# Patient Record
Sex: MALE | Race: WHITE | NOT HISPANIC OR LATINO | Employment: OTHER | RURAL
[De-identification: names, ages, dates, MRNs, and addresses within clinical notes are randomized per-mention and may not be internally consistent; named-entity substitution may affect disease eponyms.]

---

## 2021-01-18 ENCOUNTER — HISTORICAL (OUTPATIENT)
Dept: ADMINISTRATIVE | Facility: HOSPITAL | Age: 81
End: 2021-01-18

## 2021-03-23 RX ORDER — AMLODIPINE BESYLATE 5 MG/1
5 TABLET ORAL DAILY
COMMUNITY
End: 2021-06-07 | Stop reason: SDUPTHER

## 2021-03-23 RX ORDER — TAMSULOSIN HYDROCHLORIDE 0.4 MG/1
1 CAPSULE ORAL DAILY
Qty: 90 CAPSULE | Refills: 1 | Status: SHIPPED | OUTPATIENT
Start: 2021-03-23 | End: 2021-10-12 | Stop reason: SDUPTHER

## 2021-03-23 RX ORDER — AMOXICILLIN 500 MG
2 CAPSULE ORAL 2 TIMES DAILY
COMMUNITY

## 2021-03-23 RX ORDER — TAMSULOSIN HYDROCHLORIDE 0.4 MG/1
1 CAPSULE ORAL DAILY
COMMUNITY
Start: 2020-12-16 | End: 2021-03-23 | Stop reason: SDUPTHER

## 2021-03-23 RX ORDER — MELOXICAM 15 MG/1
15 TABLET ORAL DAILY PRN
COMMUNITY
Start: 2021-03-23 | End: 2021-07-20

## 2021-03-23 RX ORDER — EZETIMIBE 10 MG/1
10 TABLET ORAL DAILY
COMMUNITY
Start: 2021-02-22 | End: 2021-10-12

## 2021-03-23 RX ORDER — HYDROCHLOROTHIAZIDE 12.5 MG/1
12.5 CAPSULE ORAL DAILY
COMMUNITY
End: 2021-06-07

## 2021-03-23 RX ORDER — CLOPIDOGREL BISULFATE 75 MG/1
75 TABLET ORAL DAILY
COMMUNITY
Start: 2021-03-01 | End: 2021-06-14

## 2021-03-23 RX ORDER — PRAVASTATIN SODIUM 80 MG/1
80 TABLET ORAL NIGHTLY
COMMUNITY
Start: 2021-02-08 | End: 2021-10-12

## 2021-03-23 RX ORDER — PANTOPRAZOLE SODIUM 40 MG/1
40 TABLET, DELAYED RELEASE ORAL DAILY
COMMUNITY
End: 2022-04-18

## 2021-03-30 ENCOUNTER — OFFICE VISIT (OUTPATIENT)
Dept: FAMILY MEDICINE | Facility: CLINIC | Age: 81
End: 2021-03-30
Payer: MEDICARE

## 2021-03-30 VITALS
DIASTOLIC BLOOD PRESSURE: 88 MMHG | RESPIRATION RATE: 20 BRPM | WEIGHT: 218 LBS | HEIGHT: 69 IN | OXYGEN SATURATION: 99 % | BODY MASS INDEX: 32.29 KG/M2 | SYSTOLIC BLOOD PRESSURE: 136 MMHG | HEART RATE: 72 BPM | TEMPERATURE: 98 F

## 2021-03-30 DIAGNOSIS — I10 ESSENTIAL HYPERTENSION: ICD-10-CM

## 2021-03-30 DIAGNOSIS — M79.674 TOE PAIN, RIGHT: ICD-10-CM

## 2021-03-30 DIAGNOSIS — L03.031 CELLULITIS OF GREAT TOE OF RIGHT FOOT: Primary | ICD-10-CM

## 2021-03-30 PROCEDURE — 99213 OFFICE O/P EST LOW 20 MIN: CPT | Mod: ,,, | Performed by: NURSE PRACTITIONER

## 2021-03-30 PROCEDURE — 99213 PR OFFICE/OUTPT VISIT, EST, LEVL III, 20-29 MIN: ICD-10-PCS | Mod: ,,, | Performed by: NURSE PRACTITIONER

## 2021-03-30 RX ORDER — KETOROLAC TROMETHAMINE 30 MG/ML
60 INJECTION, SOLUTION INTRAMUSCULAR; INTRAVENOUS
Status: DISCONTINUED | OUTPATIENT
Start: 2021-03-30 | End: 2021-03-30

## 2021-03-30 RX ORDER — CEFTRIAXONE 1 G/1
1 INJECTION, POWDER, FOR SOLUTION INTRAMUSCULAR; INTRAVENOUS
Status: DISCONTINUED | OUTPATIENT
Start: 2021-03-30 | End: 2021-10-12

## 2021-03-30 RX ORDER — KETOROLAC TROMETHAMINE 30 MG/ML
60 INJECTION, SOLUTION INTRAMUSCULAR; INTRAVENOUS
Status: DISCONTINUED | OUTPATIENT
Start: 2021-03-30 | End: 2021-07-20

## 2021-03-30 RX ORDER — CEFTRIAXONE 1 G/1
1 INJECTION, POWDER, FOR SOLUTION INTRAMUSCULAR; INTRAVENOUS
Status: DISCONTINUED | OUTPATIENT
Start: 2021-03-30 | End: 2021-03-30

## 2021-04-15 ENCOUNTER — TELEPHONE (OUTPATIENT)
Dept: FAMILY MEDICINE | Facility: CLINIC | Age: 81
End: 2021-04-15

## 2021-04-20 RX ORDER — LOSARTAN POTASSIUM AND HYDROCHLOROTHIAZIDE 12.5; 1 MG/1; MG/1
1 TABLET ORAL DAILY
COMMUNITY
End: 2021-04-20 | Stop reason: SDUPTHER

## 2021-04-20 RX ORDER — LOSARTAN POTASSIUM AND HYDROCHLOROTHIAZIDE 12.5; 1 MG/1; MG/1
1 TABLET ORAL DAILY
Qty: 90 TABLET | Refills: 1 | Status: SHIPPED | OUTPATIENT
Start: 2021-04-20 | End: 2022-04-18 | Stop reason: SDUPTHER

## 2021-05-03 ENCOUNTER — HISTORICAL (OUTPATIENT)
Dept: ADMINISTRATIVE | Facility: HOSPITAL | Age: 81
End: 2021-05-03

## 2021-06-07 ENCOUNTER — OFFICE VISIT (OUTPATIENT)
Dept: FAMILY MEDICINE | Facility: CLINIC | Age: 81
End: 2021-06-07
Payer: MEDICARE

## 2021-06-07 VITALS
DIASTOLIC BLOOD PRESSURE: 100 MMHG | HEIGHT: 69 IN | SYSTOLIC BLOOD PRESSURE: 142 MMHG | BODY MASS INDEX: 32.61 KG/M2 | RESPIRATION RATE: 20 BRPM | TEMPERATURE: 98 F | HEART RATE: 63 BPM | OXYGEN SATURATION: 98 % | WEIGHT: 220.19 LBS

## 2021-06-07 DIAGNOSIS — L25.5 CONTACT DERMATITIS DUE TO PLANT: ICD-10-CM

## 2021-06-07 DIAGNOSIS — I10 ESSENTIAL HYPERTENSION: Primary | ICD-10-CM

## 2021-06-07 PROCEDURE — 96372 PR INJECTION,THERAP/PROPH/DIAG2ST, IM OR SUBCUT: ICD-10-PCS | Mod: ,,, | Performed by: NURSE PRACTITIONER

## 2021-06-07 PROCEDURE — 99214 OFFICE O/P EST MOD 30 MIN: CPT | Mod: 25,,, | Performed by: NURSE PRACTITIONER

## 2021-06-07 PROCEDURE — 99214 PR OFFICE/OUTPT VISIT, EST, LEVL IV, 30-39 MIN: ICD-10-PCS | Mod: 25,,, | Performed by: NURSE PRACTITIONER

## 2021-06-07 PROCEDURE — 96372 THER/PROPH/DIAG INJ SC/IM: CPT | Mod: ,,, | Performed by: NURSE PRACTITIONER

## 2021-06-07 RX ORDER — DEXAMETHASONE SODIUM PHOSPHATE 4 MG/ML
6 INJECTION, SOLUTION INTRA-ARTICULAR; INTRALESIONAL; INTRAMUSCULAR; INTRAVENOUS; SOFT TISSUE
Status: COMPLETED | OUTPATIENT
Start: 2021-06-07 | End: 2021-06-07

## 2021-06-07 RX ORDER — AMLODIPINE BESYLATE 5 MG/1
5 TABLET ORAL DAILY
Qty: 90 TABLET | Refills: 1 | Status: SHIPPED | OUTPATIENT
Start: 2021-06-07 | End: 2022-02-14 | Stop reason: SDUPTHER

## 2021-06-07 RX ORDER — TRIAMCINOLONE ACETONIDE 1 MG/G
OINTMENT TOPICAL 2 TIMES DAILY
Qty: 453.6 G | Refills: 0 | Status: SHIPPED | OUTPATIENT
Start: 2021-06-07 | End: 2022-12-14

## 2021-06-07 RX ADMIN — DEXAMETHASONE SODIUM PHOSPHATE 6 MG: 4 INJECTION, SOLUTION INTRA-ARTICULAR; INTRALESIONAL; INTRAMUSCULAR; INTRAVENOUS; SOFT TISSUE at 12:06

## 2021-08-17 ENCOUNTER — HOSPITAL ENCOUNTER (OUTPATIENT)
Dept: RADIOLOGY | Facility: HOSPITAL | Age: 81
Discharge: HOME | End: 2021-08-17
Attending: NURSE PRACTITIONER
Payer: MEDICARE

## 2021-08-17 DIAGNOSIS — M25.512 LEFT SHOULDER PAIN, UNSPECIFIED CHRONICITY: ICD-10-CM

## 2021-08-17 PROCEDURE — 73030 XR SHOULDER COMPLETE 2 OR MORE VIEWS LEFT: ICD-10-PCS | Mod: 26,LT,, | Performed by: RADIOLOGY

## 2021-08-17 PROCEDURE — 73030 X-RAY EXAM OF SHOULDER: CPT | Mod: TC,LT

## 2021-08-17 PROCEDURE — 73030 X-RAY EXAM OF SHOULDER: CPT | Mod: 26,LT,, | Performed by: RADIOLOGY

## 2021-10-12 ENCOUNTER — OFFICE VISIT (OUTPATIENT)
Dept: FAMILY MEDICINE | Facility: CLINIC | Age: 81
End: 2021-10-12
Payer: MEDICARE

## 2021-10-12 VITALS
HEART RATE: 75 BPM | BODY MASS INDEX: 32.14 KG/M2 | WEIGHT: 217 LBS | OXYGEN SATURATION: 98 % | TEMPERATURE: 98 F | HEIGHT: 69 IN | SYSTOLIC BLOOD PRESSURE: 128 MMHG | DIASTOLIC BLOOD PRESSURE: 80 MMHG | RESPIRATION RATE: 20 BRPM

## 2021-10-12 DIAGNOSIS — Z53.20 STATIN DECLINED: ICD-10-CM

## 2021-10-12 DIAGNOSIS — Z86.73 HISTORY OF CVA (CEREBROVASCULAR ACCIDENT): ICD-10-CM

## 2021-10-12 DIAGNOSIS — E78.2 MIXED HYPERLIPIDEMIA: ICD-10-CM

## 2021-10-12 DIAGNOSIS — I10 PRIMARY HYPERTENSION: Primary | ICD-10-CM

## 2021-10-12 PROCEDURE — 99213 PR OFFICE/OUTPT VISIT, EST, LEVL III, 20-29 MIN: ICD-10-PCS | Mod: ,,, | Performed by: NURSE PRACTITIONER

## 2021-10-12 PROCEDURE — 99213 OFFICE O/P EST LOW 20 MIN: CPT | Mod: ,,, | Performed by: NURSE PRACTITIONER

## 2021-10-12 RX ORDER — CLOPIDOGREL BISULFATE 75 MG/1
75 TABLET ORAL DAILY
Qty: 90 TABLET | Refills: 3 | Status: SHIPPED | OUTPATIENT
Start: 2021-10-12 | End: 2022-04-18 | Stop reason: SDUPTHER

## 2021-10-12 RX ORDER — TAMSULOSIN HYDROCHLORIDE 0.4 MG/1
1 CAPSULE ORAL DAILY
Qty: 90 CAPSULE | Refills: 3 | Status: SHIPPED | OUTPATIENT
Start: 2021-10-12 | End: 2022-11-29 | Stop reason: SDUPTHER

## 2021-10-28 ENCOUNTER — OFFICE VISIT (OUTPATIENT)
Dept: FAMILY MEDICINE | Facility: CLINIC | Age: 81
End: 2021-10-28
Payer: MEDICARE

## 2021-10-28 DIAGNOSIS — J01.90 ACUTE NON-RECURRENT SINUSITIS, UNSPECIFIED LOCATION: ICD-10-CM

## 2021-10-28 DIAGNOSIS — J20.9 ACUTE BRONCHITIS, UNSPECIFIED ORGANISM: Primary | ICD-10-CM

## 2021-10-28 PROCEDURE — 96372 THER/PROPH/DIAG INJ SC/IM: CPT | Mod: ,,, | Performed by: NURSE PRACTITIONER

## 2021-10-28 PROCEDURE — 99212 OFFICE O/P EST SF 10 MIN: CPT | Mod: 25,,, | Performed by: NURSE PRACTITIONER

## 2021-10-28 PROCEDURE — 99212 PR OFFICE/OUTPT VISIT, EST, LEVL II, 10-19 MIN: ICD-10-PCS | Mod: 25,,, | Performed by: NURSE PRACTITIONER

## 2021-10-28 PROCEDURE — 96372 PR INJECTION,THERAP/PROPH/DIAG2ST, IM OR SUBCUT: ICD-10-PCS | Mod: ,,, | Performed by: NURSE PRACTITIONER

## 2021-10-28 RX ORDER — PREDNISONE 20 MG/1
TABLET ORAL
Qty: 10 TABLET | Refills: 0 | Status: SHIPPED | OUTPATIENT
Start: 2021-10-28 | End: 2022-04-18

## 2021-10-28 RX ORDER — AZITHROMYCIN 250 MG/1
TABLET, FILM COATED ORAL
Qty: 6 TABLET | Refills: 0 | Status: SHIPPED | OUTPATIENT
Start: 2021-10-28 | End: 2022-04-18

## 2021-10-28 RX ORDER — DEXAMETHASONE SODIUM PHOSPHATE 4 MG/ML
6 INJECTION, SOLUTION INTRA-ARTICULAR; INTRALESIONAL; INTRAMUSCULAR; INTRAVENOUS; SOFT TISSUE
Status: COMPLETED | OUTPATIENT
Start: 2021-10-28 | End: 2021-10-28

## 2021-10-28 RX ORDER — CEFTRIAXONE 1 G/1
1 INJECTION, POWDER, FOR SOLUTION INTRAMUSCULAR; INTRAVENOUS
Status: COMPLETED | OUTPATIENT
Start: 2021-10-28 | End: 2021-10-28

## 2021-10-28 RX ADMIN — DEXAMETHASONE SODIUM PHOSPHATE 6 MG: 4 INJECTION, SOLUTION INTRA-ARTICULAR; INTRALESIONAL; INTRAMUSCULAR; INTRAVENOUS; SOFT TISSUE at 12:10

## 2021-10-28 RX ADMIN — CEFTRIAXONE 1 G: 1 INJECTION, POWDER, FOR SOLUTION INTRAMUSCULAR; INTRAVENOUS at 12:10

## 2021-10-30 VITALS — OXYGEN SATURATION: 96 % | HEART RATE: 63 BPM

## 2022-02-01 ENCOUNTER — TELEPHONE (OUTPATIENT)
Dept: FAMILY MEDICINE | Facility: CLINIC | Age: 82
End: 2022-02-01
Payer: MEDICARE

## 2022-02-01 DIAGNOSIS — K21.9 GASTROESOPHAGEAL REFLUX DISEASE, UNSPECIFIED WHETHER ESOPHAGITIS PRESENT: Primary | ICD-10-CM

## 2022-02-01 RX ORDER — FAMOTIDINE 40 MG/1
40 TABLET, FILM COATED ORAL NIGHTLY PRN
Qty: 90 TABLET | Refills: 1 | Status: SHIPPED | OUTPATIENT
Start: 2022-02-01 | End: 2022-04-18

## 2022-02-01 NOTE — TELEPHONE ENCOUNTER
Spoke to pts wife. They are unsure what medication he used to take. I looked in [facility] and only see Protonix.     Pt really wants to switch because the previous one worked a lot better than this one.    Do you possibly remember what he used to take?    [facility].

## 2022-02-01 NOTE — TELEPHONE ENCOUNTER
----- Message from Janine Retana sent at 1/31/2022  3:16 PM CST -----  Pt stated that he wants to be back on the acid reflux that matthew had given to him before he started taking this new one. He stated that the old one worked really well and this new one doesn't help him any      [phone]

## 2022-02-02 NOTE — TELEPHONE ENCOUNTER
Spoke to pt. He does not want to start any additional medications. He states he took a tums and it helped with his heartburn. I stressed the importance of following up with cardiology if his problem persisted. He voiced understanding.

## 2022-02-02 NOTE — TELEPHONE ENCOUNTER
No I have no idea. The problem is that he has to be on plavix/clopidigrel; other PPIs are contraindicated with it. Continue protonix 40 mg daily and I will add pepcid 40 mg daily. Make sure he is aware that bad indigestion/heartburn can be mistaken as an actual heart problem, so he may need to see a cardiologist or have an upper scope if the problem persists.

## 2022-02-10 ENCOUNTER — OFFICE VISIT (OUTPATIENT)
Dept: FAMILY MEDICINE | Facility: CLINIC | Age: 82
End: 2022-02-10
Payer: MEDICARE

## 2022-02-10 VITALS
WEIGHT: 215 LBS | OXYGEN SATURATION: 97 % | HEART RATE: 79 BPM | HEIGHT: 69 IN | DIASTOLIC BLOOD PRESSURE: 73 MMHG | SYSTOLIC BLOOD PRESSURE: 155 MMHG | RESPIRATION RATE: 20 BRPM | BODY MASS INDEX: 31.84 KG/M2

## 2022-02-10 DIAGNOSIS — R52 BODY ACHES: ICD-10-CM

## 2022-02-10 DIAGNOSIS — J06.9 UPPER RESPIRATORY TRACT INFECTION, UNSPECIFIED TYPE: ICD-10-CM

## 2022-02-10 DIAGNOSIS — R05.9 COUGH: ICD-10-CM

## 2022-02-10 DIAGNOSIS — R51.9 NONINTRACTABLE HEADACHE, UNSPECIFIED CHRONICITY PATTERN, UNSPECIFIED HEADACHE TYPE: ICD-10-CM

## 2022-02-10 DIAGNOSIS — I10 ESSENTIAL HYPERTENSION: ICD-10-CM

## 2022-02-10 DIAGNOSIS — U07.1 COVID-19: Primary | ICD-10-CM

## 2022-02-10 DIAGNOSIS — Z86.73 HISTORY OF CVA (CEREBROVASCULAR ACCIDENT): ICD-10-CM

## 2022-02-10 LAB
CTP QC/QA: YES
FLUAV AG NPH QL: NEGATIVE
FLUBV AG NPH QL: NEGATIVE
SARS-COV-2 AG RESP QL IA.RAPID: POSITIVE

## 2022-02-10 PROCEDURE — 99212 OFFICE O/P EST SF 10 MIN: CPT | Mod: CR,,, | Performed by: NURSE PRACTITIONER

## 2022-02-10 PROCEDURE — 99212 PR OFFICE/OUTPT VISIT, EST, LEVL II, 10-19 MIN: ICD-10-PCS | Mod: CR,,, | Performed by: NURSE PRACTITIONER

## 2022-02-10 PROCEDURE — 87428 SARSCOV & INF VIR A&B AG IA: CPT | Mod: RHCUB | Performed by: NURSE PRACTITIONER

## 2022-02-10 NOTE — PATIENT INSTRUCTIONS
Patient Education       Preventing Falls in the Older Adult   About this topic   A fall is the sudden loss of balance that causes a person to drop to the ground or floor. Falls are a serious health risk and they happen more often as we get older. Many things may increase your risk of falling, like:  · Problems that come with getting older  ? Muscle weakness  ? Balance problems  ? More trouble seeing  · Personal health factors  ? Health conditions such as arthritis, Parkinson's disease, low blood pressure, or stroke  ? Medicines you take  ? Loss of feeling in your feet  ? Being less active  ? Taking drugs that makes you dizzy or drowsy  ? Habits like alcohol use  · Things around your house  ? Slippery floors  ? Unsecured rugs  ? Stairs  ? Wearing improper fitting shoes  ? Areas where it is dark and difficult to see  ? Incorrect size or type of assistive devices  ? Clutter and items on the floor that block your walkway     What will the results be?   · Prevent future falls  · Avoid injuries and disabilities  · Improve overall health  Will there be any other care needed?   · Ask your doctor if you need to take vitamin D to help keep your bones strong.  · Make your home safer. Get rid of things that might make you trip or slip. These are things like loose rugs, electrical cords, or clutter. Add grab bars, a shower seat, and handrails.  · Wear sturdy shoes that fit well. Shoes should fit well, have a low heel, and the soles of the shoe should not be slippery. Walking in socks or with bare feet can raise your chance for falling.  · Stay active. Walk, garden, swim, or do something active on a regular basis. These activities may prevent you from getting hurt if you do fall. They also help with your strength and balance.  · Use a cane, walker, or other safety device. Be sure it is the right size for you and that you know how to use it safely. Be sure to wear your eyeglasses if they have been ordered for you.  · Get up slowly  after you sit or lie down. Try to change positions slowly.  · What to do if you fall:  ? Stay calm and do not panic.  ? Look for signs to decide if you have been hurt or have an injury.  ? If you think you can get up safely, try to get up.  ? If you are hurt or cannot get up on your own, try to get help.  ? If no one is available to help, try to get comfortable and wait for someone to arrive who can help you.  ? Stay warm and move regularly as you are able. Avoid putting too much pressure on any one area.  ? After a fall, tell family and friends that you have fallen. It is also important to talk to your doctor about your fall right away.  What problems could happen?   A fall can lead to broken bones and other serious injuries in older adults. Problems that happen because of a fall may even lead to death in older adults. Many people are not able to return to their former level of activity after a fall.  What can be done to prevent this health problem?   Lower Your Risk of Falling  · Wear your eyeglasses. Have regular eye checkups. Do not use reading glasses when you walk around.  · Quit smoking and limit alcohol intake. Smoking and too much alcohol can decrease bone mass and increase the chance of broken bones.  · Know the side effects of the drugs you are taking. Some drugs may affect your balance and cause confusion or sleepiness.  · Get up slowly after you sit or lie down. Do not change positions quickly. Do not rush when you need to go to the bathroom or to answer the phone.  Stay Physically Active  · Be physically active. This will help to improve your strength and balance.  · Fear of falling may lead you to avoid activities. Talk to your doctor. You may be sent to a physical therapist. This person can help you improve balance and build your confidence. Getting rid of your fear of falling can help you stay active and prevent future falls.  · Join an exercise program. Ask your doctor what exercise is safe for  you. Be sure to ask before you do any exercises, especially if you have illnesses like arthritis. Exercise can help you keep muscles strong and help with your balance. It is also a good way to learn proper ways to do each activity or exercise.  Safety Tips at Home  · Keep your floors and walking areas clear from clutter. Remove furniture that blocks your way. Secure cords and wires near the wall to avoid tripping over them. Get rid of throw rugs.  · Be sure the lights in your house are working well and provide good lighting throughout your home. Make sure you can reach switches and lamps easily. Place a lamp close to your bed that is easy to reach.  · Fix all steps and sidewalks to make them smooth and even. Put handrails and lights on stairs.  · Keep all the things you use often on low shelves or in cabinets that are at about waist level. Ask for help to move items off of high shelves. Do not use a chair as a step stool.  · Keep your bathroom area safe. Use nonslip rubber mats on the floor and in the tub or shower.  · Keep a phone near you in case of emergency. Keep a list of your emergency contact numbers in large print near your phone. Carry a phone with you when you go for a walk. Consider using a personal alarm device that could call for help in case you fall and cannot get up.  · Think about protecting your hip. Hip protectors may be needed if you have a higher chance for falling. Ask your doctor about this.  Where can I learn more?   American Academy of [employer] Physicians  https://familydoctor.org/axvfq-alu-to-lower-your-risk/   NHS  https://www.nhs.uk/conditions/falls/prevention/   Last Reviewed Date   2021-06-07  Consumer Information Use and Disclaimer   This information is not specific medical advice and does not replace information you receive from your health care provider. This is only a brief summary of general information. It does NOT include all information about conditions, illnesses, injuries,  tests, procedures, treatments, therapies, discharge instructions or life-style choices that may apply to you. You must talk with your health care provider for complete information about your health and treatment options. This information should not be used to decide whether or not to accept your health care providers advice, instructions or recommendations. Only your health care provider has the knowledge and training to provide advice that is right for you.  Copyright   Copyright © 2021 [employer] and its affiliates and/or licensors. All rights reserved.    Patient Education       COVID-19 Discharge Instructions   About this topic   Coronavirus disease 2019 is also known as COVID-19. It is a viral illness that infects the lungs. It is caused by a virus called SARS-associated coronavirus (SARS-CoV-2).  The signs of COVID-19 most often start a few days after you have been infected. In some people, it takes longer to show signs. Others never show signs of the infection. You may have a cough, fever, shaking chills and it may be hard to breathe. You may be very tired, have muscle aches, a headache or sore throat. Some people have an upset stomach or loose stools. Others lose their sense of smell or taste. You may not have these signs all the time and they may come and go while you are sick.  The virus spreads easily through droplets when you talk, sneeze, or cough. You can pass the virus to others when you are talking close together, singing, hugging, sharing food, or shaking hands. Doctors believe the germs also survive on surfaces like tables, door handles, and telephones. However, this is not a common way that COVID-19 spreads. Doctors believe you can also spread the infection even if you dont have any symptoms, but they do not know how that happens. This is why getting vaccinated is one of the best ways to keep you healthy and slow the spread of the virus.  Some people have a mild case of COVID-19 and are able  to stay at home and away from others until they feel better. Others may need to be in the hospital if they are very sick. Some people with COVID-19 can have some symptoms for weeks or months. People with COVID-19 must isolate themselves. You can start to be around others when your doctor says it is safe to do so.       What care is needed at home?   · Ask your doctor what you need to do when you go home. Make sure you ask questions if you do not understand what the doctor says.  · Drink lots of water, juice, or broth to replace fluids lost from a fever.  · You may use cool mist humidifiers to help ease congestion and coughing.  · Use 2 to 3 pillows to prop yourself up when you lie down to make it easier to breathe and sleep.  · Do not smoke and do not drink beer, wine, and mixed drinks (alcohol).  · To lower the chance of passing the infection to others, get a COVID-19 vaccine after your infection has resolved.  · If you have not been fully vaccinated:  ? Wear a mask over your mouth and nose if you are around others who are not sick. Cloth masks work best if they have more than one layer of fabric.  ? Wash your hands often.  ? Stay home in a separate room, if possible, away from others. Only go out to get medical care.  ? Use a separate bathroom if possible.  ? Do not make food for others.  What follow-up care is needed?   · Your doctor may ask you to make visits to the office to check on your progress. Be sure to keep these visits. Make sure you wear a mask at these visits.  · If you can, tell the staff you have COVID-19 ahead of time so they can take extra care to stop the disease from spreading.  · It may take a few weeks before your health returns to normal.  What drugs may be needed?   The doctor may order drugs to:  · Help with breathing  · Help with fever  · Help with swelling in your airways and lungs  · Control coughing  · Ease a sore throat  · Help a runny or stuffy nose  Will physical activity be limited?    You may have to limit your physical activity. Talk to your doctor about the right amount of activity for you. If you have been very sick with COVID-19, it can take some time to get your strength back.  Will there be any other care needed?   Doctors do not know how long you can pass the virus on to others after you are sick. This is why it is important to stay in a separate room, if possible, when you are sick. For now, doctors are giving general guidelines for you to follow after you have been sick. Before you go around other people, you should:  · Be fever free for 24 hours without taking any drugs to lower the fever  · Have no symptoms of cough or shortness of breath  · Wait at least 10 days after first having symptoms or your first positive test, and you need to be symptom free as above. Some experts suggest waiting 20 days if you have had a more severe infection.  Talk with your doctor about getting a COVID-19 vaccine.  What problems could happen?   · Fluid loss. This is dehydration.  · Short-term or long-term lung damage  · Heart problems  · Death  When do I need to call the doctor?   · You are having so much trouble breathing that you can only say one or two words at a time.  · You need to sit upright at all times to be able to breathe and/or cannot lie down.  · You are very confused or cannot stay awake.  · Your lips or skin start to turn blue or grey.  · You think you might be having a medical emergency. Some examples of medical emergencies are:  ? Severe chest pain.  ? Not able to speak or move normally.  · You have trouble breathing when talking or sitting still.  · You have new shortness of breath.  · You become weak or dizzy.  · You have very dark urine or do not pass urine for more than 8 hours.  · You have new or worsening COVID-19 symptoms like:  ? Fever  ? Cough  ? Feeling very tired  ? Shaking chills  ? Headache  ? Trouble swallowing  ? Throwing up  ? Loose stools  ? Reddish purple spots on your  fingers or toes  Teach Back: Helping You Understand   The Teach Back Method helps you understand the information we are giving you. After you talk with the staff, tell them in your own words what you learned. This helps to make sure the staff has described each thing clearly. It also helps to explain things that may have been confusing. Before going home, make sure you can do these:  · I can tell you about my condition.  · I can tell you what may help ease my breathing.  · I can tell you what I can do to help avoid passing the infection to others.  · I can tell you what I will do if I have trouble breathing; feel sleepy or confused; or my fingertips, fingernails, skin, or lips are blue.  Where can I learn more?   Centers for Disease Control and Prevention  https://www.cdc.gov/coronavirus/2019-ncov/about/index.html   Centers for Disease Control and Prevention  https://www.cdc.gov/coronavirus/2019-ncov/hcp/disposition-in-home-patients.html   World Health Organization  https://www.who.int/news-room/q-a-detail/a-k-auxyuvbsdxqew   Last Reviewed Date   2021-10-05  Consumer Information Use and Disclaimer   This information is not specific medical advice and does not replace information you receive from your health care provider. This is only a brief summary of general information. It does NOT include all information about conditions, illnesses, injuries, tests, procedures, treatments, therapies, discharge instructions or life-style choices that may apply to you. You must talk with your health care provider for complete information about your health and treatment options. This information should not be used to decide whether or not to accept your health care providers advice, instructions or recommendations. Only your health care provider has the knowledge and training to provide advice that is right for you.  Copyright   Copyright © 2021 UpToDate, Inc. and its affiliates and/or licensors. All rights reserved.  Patient  Education       Sotrovimab FDA Fact Sheet   About this topic         Sotrovimab FDA Fact Sheet: https://www.fda.gov/media/210340/download   https://www.[employer]   Report side effects to FDA MedWatch: www.fda.gov/medwatch   FDA: https://www.fda.gov/emergency-preparedness-and-response/mcm-legal-regulatory-and-policy-framework/emergency-use-authorization   NIH: https://www.tribz64zzmaludtyfzcupurtto.nih.gov/   Combat Covid: https://combatcovid.hhs.gov/i-have-covid-19-now/available-covid-19-treatment-options

## 2022-02-10 NOTE — PROGRESS NOTES
[facility] - FAMILY MEDICINE       PATIENT NAME: Cj Thomas   : 1940    AGE: 81 y.o. DATE: 02/10/2022    MRN: [id number]        Reason for Visit / Chief Complaint:  URI (Fever(101.9), fatigue, cough, headache, body aches, congestion, sore throat x 1 day. No SOB, loss of taste/smell. Vaccinated. No known exposure.)     Subjective:     Presents with wife for car visit due to COVID-19 concerns and screening guidelines. Chief complaint reviewed. C/o symptoms as indicated in ROS x 1 day.  [place] fine last night, went to [employer]. Malaise and temp started today.    Review of Systems:    Review of Systems   Constitutional: Positive for fatigue and fever. Negative for appetite change and chills.   HENT: Positive for congestion and sore throat. Negative for ear pain, postnasal drip, rhinorrhea and sinus pain.    Respiratory: Positive for cough. Negative for shortness of breath and wheezing.    Cardiovascular: Negative for chest pain.   Gastrointestinal: Negative for abdominal pain, diarrhea, nausea and vomiting.   Musculoskeletal: Positive for myalgias.   Skin: Negative.    Neurological: Positive for headaches.        Review of patient's allergies indicates:   Allergen Reactions    Niacin Swelling    Demerol [meperidine]     Iodine and iodide containing products     Statins-hmg-coa reductase inhibitors      Abnormal behavior. Body aches    Iodinated contrast media Rash        Med List:  Current Outpatient Medications on File Prior to Visit   Medication Sig Dispense Refill    amLODIPine (NORVASC) 5 MG tablet Take 1 tablet (5 mg total) by mouth once daily. 90 tablet 1    clopidogreL (PLAVIX) 75 mg tablet Take 1 tablet (75 mg total) by mouth once daily. 90 tablet 3    famotidine (PEPCID) 40 MG tablet Take 1 tablet (40 mg total) by mouth nightly as needed for Heartburn. 90 tablet 1    losartan-hydrochlorothiazide 100-12.5 mg (HYZAAR) 100-12.5 mg Tab Take 1  "tablet by mouth once daily. (Patient taking differently: Take 1 tablet by mouth once daily. Taking 0.5 tablet once daily) 90 tablet 1    omega-3 fatty acids/fish oil (FISH OIL-OMEGA-3 FATTY ACIDS) 300-1,000 mg capsule Take 2 capsules by mouth 2 (two) times a day.       pantoprazole (PROTONIX) 40 MG tablet Take 40 mg by mouth once daily.      predniSONE (DELTASONE) 20 MG tablet 1 tablet by mouth BID x 5 days 10 tablet 0    tamsulosin (FLOMAX) 0.4 mg Cap Take 1 capsule (0.4 mg total) by mouth once daily. 90 capsule 3    triamcinolone acetonide 0.1% (KENALOG) 0.1 % ointment Apply topically 2 (two) times daily. 453.6 g 0    azithromycin (Z-PAPI) 250 MG tablet Take 2 tablets by mouth on day 1; Take 1 tablet by mouth on days 2-5 (Patient not taking: Reported on 2/10/2022) 6 tablet 0     No current facility-administered medications on file prior to visit.       Medical/Social/Family History:  Past Medical History:   Diagnosis Date    Hyperlipidemia     Hypertension     Stroke       Social History     Tobacco Use   Smoking Status Never Smoker   Smokeless Tobacco Never Used      Immunization History   Administered Date(s) Administered    Influenza 01/21/2011, 10/01/2011, 11/01/2013    Influenza - High Dose - PF (65 years and older) 12/28/2006    Pneumococcal 02/28/2006    Pneumococcal Conjugate - 13 Valent 04/09/2018    Pneumococcal Polysaccharide - 23 Valent 02/28/2006, 06/20/2014, 04/01/2018    Td (Adult), Unspecified Formulation 07/01/2005    Tdap 11/20/2015, 09/07/2017        Objective:      Vitals:    02/10/22 1409 02/10/22 1422   BP: (!) 149/67 (!) 155/73   BP Location: Left arm Left arm   Patient Position: Sitting Sitting   BP Method: Large (Automatic) Large (Automatic)   Pulse: 79    Resp: 20    SpO2: 97%    Weight: 97.5 kg (215 lb)  Comment: Pt estimated    Height: 5' 9" (1.753 m)      Body mass index is 31.75 kg/m².     Physical Exam:    Physical Exam  Vitals and nursing note reviewed. "   Constitutional:       General: He is not in acute distress.     Appearance: Normal appearance. He is obese. He is not ill-appearing.   HENT:      Head: Normocephalic.   Eyes:      Conjunctiva/sclera: Conjunctivae normal.   Cardiovascular:      Rate and Rhythm: Normal rate.   Pulmonary:      Effort: Pulmonary effort is normal. No respiratory distress.   Skin:     Coloration: Skin is not pale.   Neurological:      Mental Status: He is alert and oriented to person, place, and time.         Assessment:          ICD-10-CM ICD-9-CM   1. COVID-19  U07.1 079.89   2. Body aches  R52 780.96   3. Nonintractable headache, unspecified chronicity pattern, unspecified headache type  R51.9 784.0   4. Cough  R05.9 786.2   5. Upper respiratory tract infection, unspecified type  J06.9 465.9   6. Essential hypertension  I10 401.9   7. History of CVA (cerebrovascular accident)  Z86.73 V12.54        Plan:       COVID-19  -     Ambulatory referral/consult to [facility]; Future; Expected date: 02/10/2022    Body aches  -     POCT SARS-COV2 (COVID) with Flu Antigen    Nonintractable headache, unspecified chronicity pattern, unspecified headache type  -     POCT SARS-COV2 (COVID) with Flu Antigen    Cough  -     POCT SARS-COV2 (COVID) with Flu Antigen    Upper respiratory tract infection, unspecified type  -     POCT SARS-COV2 (COVID) with Flu Antigen    Essential hypertension  -     Ambulatory referral/consult to [facility]; Future; Expected date: 02/10/2022    History of CVA (cerebrovascular accident)  -     Ambulatory referral/consult to [facility]; Future; Expected date: 02/10/2022        Current Outpatient Medications:     amLODIPine (NORVASC) 5 MG tablet, Take 1 tablet (5 mg total) by mouth once daily., Disp: 90 tablet, Rfl: 1    clopidogreL (PLAVIX) 75 mg tablet, Take 1 tablet (75 mg total) by mouth once daily., Disp: 90 tablet, Rfl: 3    famotidine (PEPCID) 40 MG tablet, Take 1 tablet (40 mg total) by mouth nightly as  needed for Heartburn., Disp: 90 tablet, Rfl: 1    losartan-hydrochlorothiazide 100-12.5 mg (HYZAAR) 100-12.5 mg Tab, Take 1 tablet by mouth once daily. (Patient taking differently: Take 1 tablet by mouth once daily. Taking 0.5 tablet once daily), Disp: 90 tablet, Rfl: 1    omega-3 fatty acids/fish oil (FISH OIL-OMEGA-3 FATTY ACIDS) 300-1,000 mg capsule, Take 2 capsules by mouth 2 (two) times a day. , Disp: , Rfl:     pantoprazole (PROTONIX) 40 MG tablet, Take 40 mg by mouth once daily., Disp: , Rfl:     predniSONE (DELTASONE) 20 MG tablet, 1 tablet by mouth BID x 5 days, Disp: 10 tablet, Rfl: 0    tamsulosin (FLOMAX) 0.4 mg Cap, Take 1 capsule (0.4 mg total) by mouth once daily., Disp: 90 capsule, Rfl: 3    triamcinolone acetonide 0.1% (KENALOG) 0.1 % ointment, Apply topically 2 (two) times daily., Disp: 453.6 g, Rfl: 0    azithromycin (Z-PAPI) 250 MG tablet, Take 2 tablets by mouth on day 1; Take 1 tablet by mouth on days 2-5 (Patient not taking: Reported on 2/10/2022), Disp: 6 tablet, Rfl: 0      Requested Prescriptions      No prescriptions requested or ordered in this encounter       Rapid COVID POSITIVE and flu negative     Advised to quarantine per updated CDC guidelines.  Advised COVID-19 is a virus and viruses do not respond to antibiotics nor do antibiotics prevent a bacterial infection from developing.   Advised there is still some delta variant COVID-19 circulating, but the Omicron variant seems to be rapidly increasing in the area and tends to have milder symptoms that are shorter in duration.     Advised of the nationwide shortage of COVID MABs, and the very limited supply for very high risk patient's only.   Risk of COVID-19 Complications score: 6     Treatment for COVID-19 is primarily supportive including rest, increase fluids, and the use of OTC meds to help alleviate symptoms.    For immune support take vitamin C, vitamin D, and zinc 50 mg daily.  Acetaminophen or ibuprofen as needed for body  aches, headache, or fever.  Home O2 sat monitoring can be used if experiencing shortness of breath.   Advised to report to ED for evaluation of any new or worsening chest pain, shortness of breath, or other severe symptoms.  Call the office if you have other questions or concerns that arise.    F/u as needed or if symptoms worsen or persist.    Signature: Samantha Araujo FNP-BC

## 2022-02-11 ENCOUNTER — INFUSION (OUTPATIENT)
Dept: INFECTIOUS DISEASES | Facility: HOSPITAL | Age: 82
End: 2022-02-11
Attending: NURSE PRACTITIONER
Payer: MEDICARE

## 2022-02-11 VITALS
HEART RATE: 76 BPM | DIASTOLIC BLOOD PRESSURE: 72 MMHG | SYSTOLIC BLOOD PRESSURE: 135 MMHG | TEMPERATURE: 99 F | OXYGEN SATURATION: 96 %

## 2022-02-11 DIAGNOSIS — I10 ESSENTIAL HYPERTENSION: ICD-10-CM

## 2022-02-11 DIAGNOSIS — U07.1 COVID-19: ICD-10-CM

## 2022-02-11 DIAGNOSIS — Z86.73 HISTORY OF CVA (CEREBROVASCULAR ACCIDENT): ICD-10-CM

## 2022-02-11 PROCEDURE — 63600175 PHARM REV CODE 636 W HCPCS: Performed by: NURSE PRACTITIONER

## 2022-02-11 PROCEDURE — M0247 HC IV INFUSION, SOTROVIMAB, INCL POST ADMIN MONIT: HCPCS | Performed by: NURSE PRACTITIONER

## 2022-02-11 PROCEDURE — 25000003 PHARM REV CODE 250: Performed by: NURSE PRACTITIONER

## 2022-02-11 RX ORDER — EPINEPHRINE 0.3 MG/.3ML
0.3 INJECTION SUBCUTANEOUS
Status: ACTIVE | OUTPATIENT
Start: 2022-02-11 | End: 2022-02-18

## 2022-02-11 RX ORDER — ALBUTEROL SULFATE 90 UG/1
2 AEROSOL, METERED RESPIRATORY (INHALATION)
Status: ACTIVE | OUTPATIENT
Start: 2022-02-11 | End: 2022-02-18

## 2022-02-11 RX ORDER — ACETAMINOPHEN 325 MG/1
650 TABLET ORAL ONCE AS NEEDED
Status: DISCONTINUED | OUTPATIENT
Start: 2022-02-11 | End: 2022-04-18

## 2022-02-11 RX ORDER — ONDANSETRON 4 MG/1
4 TABLET, ORALLY DISINTEGRATING ORAL
Status: ACTIVE | OUTPATIENT
Start: 2022-02-11 | End: 2022-02-18

## 2022-02-11 RX ORDER — SODIUM CHLORIDE 0.9 % (FLUSH) 0.9 %
10 SYRINGE (ML) INJECTION
Status: ACTIVE | OUTPATIENT
Start: 2022-02-11 | End: 2022-02-18

## 2022-02-11 RX ORDER — DIPHENHYDRAMINE HYDROCHLORIDE 50 MG/ML
25 INJECTION INTRAMUSCULAR; INTRAVENOUS
Status: ACTIVE | OUTPATIENT
Start: 2022-02-11 | End: 2022-02-18

## 2022-02-11 RX ADMIN — SODIUM CHLORIDE 500 MG: 9 INJECTION, SOLUTION INTRAVENOUS at 11:02

## 2022-02-14 DIAGNOSIS — I10 ESSENTIAL HYPERTENSION: ICD-10-CM

## 2022-02-14 RX ORDER — AMLODIPINE BESYLATE 5 MG/1
5 TABLET ORAL DAILY
Qty: 90 TABLET | Refills: 1 | Status: SHIPPED | OUTPATIENT
Start: 2022-02-14 | End: 2022-02-15

## 2022-03-11 DIAGNOSIS — Z71.89 COMPLEX CARE COORDINATION: ICD-10-CM

## 2022-04-18 ENCOUNTER — OFFICE VISIT (OUTPATIENT)
Dept: FAMILY MEDICINE | Facility: CLINIC | Age: 82
End: 2022-04-18
Payer: MEDICARE

## 2022-04-18 VITALS
BODY MASS INDEX: 31.58 KG/M2 | WEIGHT: 213.19 LBS | OXYGEN SATURATION: 98 % | HEART RATE: 70 BPM | DIASTOLIC BLOOD PRESSURE: 76 MMHG | RESPIRATION RATE: 20 BRPM | HEIGHT: 69 IN | SYSTOLIC BLOOD PRESSURE: 124 MMHG | TEMPERATURE: 97 F

## 2022-04-18 DIAGNOSIS — E78.2 MIXED HYPERLIPIDEMIA: ICD-10-CM

## 2022-04-18 DIAGNOSIS — M79.674 TOE PAIN, BILATERAL: ICD-10-CM

## 2022-04-18 DIAGNOSIS — I10 ESSENTIAL HYPERTENSION: Primary | ICD-10-CM

## 2022-04-18 DIAGNOSIS — Z86.73 HISTORY OF CVA (CEREBROVASCULAR ACCIDENT): ICD-10-CM

## 2022-04-18 DIAGNOSIS — Z79.899 ENCOUNTER FOR LONG-TERM (CURRENT) USE OF OTHER MEDICATIONS: ICD-10-CM

## 2022-04-18 DIAGNOSIS — M79.675 TOE PAIN, BILATERAL: ICD-10-CM

## 2022-04-18 DIAGNOSIS — Z53.20 STATIN DECLINED: ICD-10-CM

## 2022-04-18 LAB
ALBUMIN SERPL BCP-MCNC: 3.7 G/DL (ref 3.5–5)
ALBUMIN/GLOB SERPL: 1.1 {RATIO}
ALP SERPL-CCNC: 83 U/L (ref 45–115)
ALT SERPL W P-5'-P-CCNC: 25 U/L (ref 16–61)
ANION GAP SERPL CALCULATED.3IONS-SCNC: 10 MMOL/L (ref 7–16)
AST SERPL W P-5'-P-CCNC: 12 U/L (ref 15–37)
BASOPHILS # BLD AUTO: 0.07 K/UL (ref 0–0.2)
BASOPHILS NFR BLD AUTO: 1 % (ref 0–1)
BILIRUB SERPL-MCNC: 0.6 MG/DL (ref 0–1.2)
BUN SERPL-MCNC: 12 MG/DL (ref 7–18)
BUN/CREAT SERPL: 13 (ref 6–20)
CALCIUM SERPL-MCNC: 9.1 MG/DL (ref 8.5–10.1)
CHLORIDE SERPL-SCNC: 102 MMOL/L (ref 98–107)
CHOLEST SERPL-MCNC: 240 MG/DL (ref 0–200)
CHOLEST/HDLC SERPL: 5.6 {RATIO}
CO2 SERPL-SCNC: 31 MMOL/L (ref 21–32)
CREAT SERPL-MCNC: 0.9 MG/DL (ref 0.7–1.3)
DIFFERENTIAL METHOD BLD: ABNORMAL
EOSINOPHIL # BLD AUTO: 0.36 K/UL (ref 0–0.5)
EOSINOPHIL NFR BLD AUTO: 5.3 % (ref 1–4)
ERYTHROCYTE [DISTWIDTH] IN BLOOD BY AUTOMATED COUNT: 12.5 % (ref 11.5–14.5)
GLOBULIN SER-MCNC: 3.3 G/DL (ref 2–4)
GLUCOSE SERPL-MCNC: 85 MG/DL (ref 74–106)
HCT VFR BLD AUTO: 45.3 % (ref 40–54)
HDLC SERPL-MCNC: 43 MG/DL (ref 40–60)
HGB BLD-MCNC: 14.9 G/DL (ref 13.5–18)
IMM GRANULOCYTES # BLD AUTO: 0.02 K/UL (ref 0–0.04)
IMM GRANULOCYTES NFR BLD: 0.3 % (ref 0–0.4)
LDLC SERPL CALC-MCNC: 160 MG/DL
LDLC/HDLC SERPL: 3.7 {RATIO}
LYMPHOCYTES # BLD AUTO: 1.32 K/UL (ref 1–4.8)
LYMPHOCYTES NFR BLD AUTO: 19.5 % (ref 27–41)
MCH RBC QN AUTO: 30.9 PG (ref 27–31)
MCHC RBC AUTO-ENTMCNC: 32.9 G/DL (ref 32–36)
MCV RBC AUTO: 94 FL (ref 80–96)
MONOCYTES # BLD AUTO: 0.68 K/UL (ref 0–0.8)
MONOCYTES NFR BLD AUTO: 10 % (ref 2–6)
MPC BLD CALC-MCNC: 10.5 FL (ref 9.4–12.4)
NEUTROPHILS # BLD AUTO: 4.33 K/UL (ref 1.8–7.7)
NEUTROPHILS NFR BLD AUTO: 63.9 % (ref 53–65)
NONHDLC SERPL-MCNC: 197 MG/DL
NRBC # BLD AUTO: 0 X10E3/UL
NRBC, AUTO (.00): 0 %
PLATELET # BLD AUTO: 332 K/UL (ref 150–400)
POTASSIUM SERPL-SCNC: 4.8 MMOL/L (ref 3.5–5.1)
PROT SERPL-MCNC: 7 G/DL (ref 6.4–8.2)
RBC # BLD AUTO: 4.82 M/UL (ref 4.6–6.2)
SODIUM SERPL-SCNC: 138 MMOL/L (ref 136–145)
TRIGL SERPL-MCNC: 187 MG/DL (ref 35–150)
TSH SERPL DL<=0.005 MIU/L-ACNC: 1.74 UIU/ML (ref 0.36–3.74)
URATE SERPL-MCNC: 6.3 MG/DL (ref 3.5–7.2)
VLDLC SERPL-MCNC: 37 MG/DL
WBC # BLD AUTO: 6.78 K/UL (ref 4.5–11)

## 2022-04-18 PROCEDURE — 85025 CBC WITH DIFFERENTIAL: ICD-10-PCS | Mod: ,,, | Performed by: CLINICAL MEDICAL LABORATORY

## 2022-04-18 PROCEDURE — 84443 TSH: ICD-10-PCS | Mod: ,,, | Performed by: CLINICAL MEDICAL LABORATORY

## 2022-04-18 PROCEDURE — 85025 COMPLETE CBC W/AUTO DIFF WBC: CPT | Mod: ,,, | Performed by: CLINICAL MEDICAL LABORATORY

## 2022-04-18 PROCEDURE — 99214 OFFICE O/P EST MOD 30 MIN: CPT | Mod: ,,, | Performed by: NURSE PRACTITIONER

## 2022-04-18 PROCEDURE — 99214 PR OFFICE/OUTPT VISIT, EST, LEVL IV, 30-39 MIN: ICD-10-PCS | Mod: ,,, | Performed by: NURSE PRACTITIONER

## 2022-04-18 PROCEDURE — 80053 COMPREHENSIVE METABOLIC PANEL: ICD-10-PCS | Mod: ,,, | Performed by: CLINICAL MEDICAL LABORATORY

## 2022-04-18 PROCEDURE — 84443 ASSAY THYROID STIM HORMONE: CPT | Mod: ,,, | Performed by: CLINICAL MEDICAL LABORATORY

## 2022-04-18 PROCEDURE — 80061 LIPID PANEL: ICD-10-PCS | Mod: ,,, | Performed by: CLINICAL MEDICAL LABORATORY

## 2022-04-18 PROCEDURE — 80053 COMPREHEN METABOLIC PANEL: CPT | Mod: ,,, | Performed by: CLINICAL MEDICAL LABORATORY

## 2022-04-18 PROCEDURE — 80061 LIPID PANEL: CPT | Mod: ,,, | Performed by: CLINICAL MEDICAL LABORATORY

## 2022-04-18 PROCEDURE — 84550 URIC ACID: ICD-10-PCS | Mod: ,,, | Performed by: CLINICAL MEDICAL LABORATORY

## 2022-04-18 PROCEDURE — 84550 ASSAY OF BLOOD/URIC ACID: CPT | Mod: ,,, | Performed by: CLINICAL MEDICAL LABORATORY

## 2022-04-18 RX ORDER — CLOPIDOGREL BISULFATE 75 MG/1
75 TABLET ORAL DAILY
Qty: 90 TABLET | Refills: 3 | Status: SHIPPED | OUTPATIENT
Start: 2022-04-18 | End: 2023-01-17 | Stop reason: SDUPTHER

## 2022-04-18 RX ORDER — LOSARTAN POTASSIUM AND HYDROCHLOROTHIAZIDE 12.5; 1 MG/1; MG/1
TABLET ORAL
Qty: 45 TABLET | Refills: 1 | Status: SHIPPED | OUTPATIENT
Start: 2022-04-18 | End: 2022-08-29 | Stop reason: SDUPTHER

## 2022-04-18 NOTE — PATIENT INSTRUCTIONS
Patient Education       Preventing Falls   The Basics   Written by the doctors and editors at [facility]   Am I at risk of falling? -- Your risk of falling increases as you grow older. That's because getting older can make it harder to walk steadily and keep your balance. Also, the effects of falls are more serious in older people.  Overall, 3 to 4 out of every 10 people over the age of 65 fall each year. Up to 75 percent of people who fracture a hip never recover to the point they were before they had their fracture. If you have fallen in the past, you are at higher risk of falling again.  Several things can increase your risk of a fall, including:  Illness  A change in the medicines you take  An unsafe or unfamiliar setting (for example, a room with rugs or furniture that might trip you, or an area you don't know well)  How can my doctor help me to avoid falling? -- Your doctor can talk to you about the following things:  Past falls - It is important to tell your doctor about any times you have fallen or almost fallen. He or she can then suggest ways to prevent another fall.  Your health conditions - Some health problems can put you at risk of falling. These include conditions that affect eyesight, hearing, muscle strength, or balance.  The medicines you take - Certain medicines can increase the risk of falling. These include some medicines that are used for sleeping problems, anxiety, high blood pressure, or depression. Adding new medicines, or changing doses of some medicines, can also affect your risk of falling.  The more your doctor knows about your situation, the better he or she will be able to help you. For example, if you fell because you have a condition that causes pain, your doctor might suggest treatments to deal with the pain. Or if one of your medicines is making you dizzy and more likely to fall, your doctor might switch you to a different medicine.  Is there anything I can do on my own? -- Yes. To  help keep from falling, you can:  Make your home safer - To avoid falling at home, get rid of things that might make you trip or slip. This might include furniture, electrical cords, clutter, and loose rugs (figure 1). Keep your home well-lit so that you can easily see where you are going. Avoid storing things in high places so you don't have to reach or climb.  Wear sturdy shoes that fit well - Wearing shoes with high heels or slippery soles, or shoes that are too loose, can lead to falls. Walking around in bare feet, or only socks, can also increase your risk of falling.  Take vitamin D pills - Taking vitamin D might lower the risk of falls in older people. This is because vitamin D helps make bones and muscles stronger. Your doctor can talk to you about whether you should take extra vitamin D, and how much.  Stay active - Exercising on a regular basis can help lower your risk of falling. It might also help prevent you from getting hurt if you do fall. It is best to do a few different activities that help with both strength and balance. There are many kinds of exercise that can be safe for older people. These include walking, swimming, and Derrick Chi (a Chinese martial art that involves slow, gentle movements).  Use a cane, walker, and other safety devices - If your doctor recommends that you use a cane or walker, be sure that it's the right size and you know how to use it. There are other devices that might help you avoid falling, too. These include grab bars or a sturdy seat for the shower, non-slip bath mats, and hand rails or treads for the stairs (to prevent slipping).  If you worry that you could fall, there are also alarm buttons that let you call for help if you fall and can't get up.  What should I do if I fall? -- If you fall, see your doctor right away, even if you aren't hurt. Your doctor can try to figure out what caused you to fall, and how likely you are to fall again. He or she will do an exam and  talk to you about your health problems, medicines, and activities. Then he or she can suggest things you can do to avoid falling again.  Many older people have a hard time recovering after a fall. Doing things to prevent falling can help you to protect your health and independence.  All topics are updated as new evidence becomes available and our peer review process is complete.  This topic retrieved from [employer] on: Sep 21, 2021.  Topic 46469 Version 18.0  Release: 29.4.2 - C29.263  © 2021 UpToDate, Inc. and/or its affiliates. All rights reserved.  figure 1: How to avoid falling at home     This picture shows some of the things that can cause a fall in your home. Look around and remove any loose rugs, electrical cords, clutter, or furniture that could trip you.  Graphic 89833 Version 1.0    Consumer Information Use and Disclaimer   This information is not specific medical advice and does not replace information you receive from your health care provider. This is only a brief summary of general information. It does NOT include all information about conditions, illnesses, injuries, tests, procedures, treatments, therapies, discharge instructions or life-style choices that may apply to you. You must talk with your health care provider for complete information about your health and treatment options. This information should not be used to decide whether or not to accept your health care provider's advice, instructions or recommendations. Only your health care provider has the knowledge and training to provide advice that is right for you. The use of this information is governed by the [employer] End User License Agreement, available at https://www.[employer]/en/solutions/[employer]/about/melecio.The use of [employer] content is governed by the [employer] Terms of Use. ©2021 UpToDate, Inc. All rights reserved.  Copyright   © 2021 UpToDate, Inc. and/or its affiliates. All rights reserved.  Patient Education       High Blood  "Pressure in Adults   The Basics   Written by the doctors and editors at [facility]   What is high blood pressure? -- High blood pressure is a condition that puts you at risk for heart attack, stroke, and kidney disease. It does not usually cause symptoms. But it can be serious.  When your doctor or nurse tells you your blood pressure, they will say 2 numbers. For instance, your doctor or nurse might say that your blood pressure is "130 over 80." The top number is the pressure inside your arteries when your heart is jose enrique. The bottom number is the pressure inside your arteries when your heart is relaxed.  "Elevated blood pressure" is a term doctors or nurses use as a warning. People with elevated blood pressure do not yet have high blood pressure. But their blood pressure is not as low as it should be for good health.  Many experts define high, elevated, and normal blood pressure as follows:  High - Top number of 130 or above and/or bottom number of 80 or above  Elevated - Top number between 120 and 129 and bottom number of 79 or below  Normal - Top number of 119 or below and bottom number of 79 or below  This information is also in the table (table 1).   How can I lower my blood pressure? -- If your doctor or nurse has prescribed blood pressure medicine, the most important thing you can do is to take it. If it causes side effects, do not just stop taking it. Instead, talk to your doctor or nurse about the problems it causes. They might be able to lower your dose or switch you to another medicine. If cost is a problem, mention that too. They might be able to put you on a less expensive medicine. Taking your blood pressure medicine can keep you from having a heart attack or stroke, and it can save your life!  Can I do anything on my own? -- You have a lot of control over your blood pressure. To lower it:  Lose weight (if you are overweight)  Choose a diet low in fat and rich in fruits, vegetables, and low-fat " "dairy products  Reduce the amount of salt you eat  Do something active for at least 30 minutes a day on most days of the week  Cut down on alcohol (if you drink more than 2 alcoholic drinks per day)  It's also a good idea to get a home blood pressure meter. People who check their own blood pressure at home do better at keeping it low and can sometimes even reduce the amount of medicine they take.  All topics are updated as new evidence becomes available and our peer review process is complete.  This topic retrieved from [employer] on: Sep 21, 2021.  Topic 09552 Version 15.0  Release: 29.4.2 - C29.263  © 2021 UpToDate, Inc. and/or its affiliates. All rights reserved.  table 1: Definition of normal and high blood pressure  Level  Top number  Bottom number    High 130 or above 80 or above   Elevated 120 to 129 79 or below   Normal 119 or below 79 or below   These definitions are from the American College of Cardiology/American Heart Association. Other expert groups might use slightly different definitions.  "Elevated blood pressure" is a term doctor or nurses use as a warning. It means you do not yet have high blood pressure, but your blood pressure is not as low as it should be for good health.  Graphic 90265 Version 6.0  Consumer Information Use and Disclaimer   This information is not specific medical advice and does not replace information you receive from your health care provider. This is only a brief summary of general information. It does NOT include all information about conditions, illnesses, injuries, tests, procedures, treatments, therapies, discharge instructions or life-style choices that may apply to you. You must talk with your health care provider for complete information about your health and treatment options. This information should not be used to decide whether or not to accept your health care provider's advice, instructions or recommendations. Only your health care provider has the knowledge and " training to provide advice that is right for you. The use of this information is governed by the [employer] End User License Agreement, available at https://www.[employer]/en/solutions/[employer]/about/melecio.The use of [employer] content is governed by the [employer] Terms of Use. ©2021 UpToDate, Inc. All rights reserved.  Copyright   © 2021 UpToDate, Inc. and/or its affiliates. All rights reserved.  Patient Education       DASH Diet   About this topic   DASH stands for Dietary Approaches to Stop Hypertension. The DASH diet may help you lower blood pressure. It may also help keep you from getting high blood pressure. You will eat less fat and more fiber on the DASH diet.  This diet gives you more minerals that fight high blood pressure. Some nutrients in this diet are:  Potassium ? Acts to help you get rid of salt. This may help to lower blood pressure.  Calcium ? Makes blood vessels and muscles work the right way  Magnesium - Helps blood vessels relax  Fiber ? Helps you feel full. It also helps digestion.  What will the results be?   The DASH diet may help you:  Lower your blood pressure and cholesterol  Lower your risk for cancer, heart disease, heart attack, and stroke. It may also lower your risk for heart failure, kidney stones, and diabetes.  Lose weight or keep a healthy weight  What lifestyle changes are needed?   Add regular exercise to get the most help from this diet.  Try to lower stress. Find ways to relax.  Stop smoking. Avoid secondhand smoke.  Limit alcohol intake.  What changes to diet are needed?   Know about poor eating habits. Then, you can fix them as you work with the program.  This diet encourages fruits and vegetables, whole grains, lean meats, healthy fats, and low-fat or fat-free dairy products.  This diet is lower in saturated fats, trans-fats, cholesterol, added sugars, and sodium.  Who should use this diet?   This eating plan is good for the whole family. It is also good for people with  high blood pressure and those at risk for high blood pressure.  What foods are good to eat?   Grains: Try to eat 6 to 8 servings of whole grain, high fiber foods each day. These are bread, cereals, brown rice, or pasta.  Fruits and vegetables: Eat 4 to 5 servings each day. Try to pick many kinds and colors. Fresh or frozen are best. Look for low sodium or salt-free if you choose canned.  Dairy: Try to eat 2 to 3 servings of fat free and low fat milk products each day.  Lean meats, poultry, and seafood: Try to eat 6 servings or less of lean meats, poultry, and seafood each day. Try to choose more low fat or lean meats like chicken and turkey. Eat less red meat. Eat more fish instead.  Nuts, seeds, and legumes (dry beans and peas): Try to eat 4 to 5 servings each week. Try to pick nuts such as almonds and walnuts, sunflower seeds, peanut butter, soy beans, lentils, kidney beans, and split peas.  Fats and oils: Try to eat 2 to 3 servings of fats and oils each day. Eat good fats found in fish, nuts, and avocados. Try using olive oil or vegetable oils such as canola oil. Other good oils to try are corn, safflower, sunflower, or soybean oils. Use low-sodium and low-fat salad dressing and mayonnaise.  Condiments: Pepper, herbs, spices, vinegar, lemon or lime juices are great for seasoning. Be careful to choose low-sodium or salt-free products if you use broths, soups, or soy sauce.  Sweets: Try to eat less than 5 servings each week. Choose low-fat and trans fat-free desserts. These are things like fruit flavored gelatin, sorbet, jelly beans, leni crackers, animal crackers, low-fat fig bars, and shailesh snaps. Eat fruit to satisfy your desire for sweets.     What foods should be limited or avoided?   Grains: Salted breads, rolls, crackers, quick breads, self-rising flours, biscuit mixes, regular bread crumbs, instant hot cereals, commercially-prepared rice, pasta, stuffing mixes  Fruits and vegetables:  Commercially-prepared potatoes and vegetable mixes, regular canned vegetables and juices, vegetables frozen with sauce or pickled vegetables, processed fruits with salt or sodium  Milk: Whole milk, malted milk, chocolate milk, buttermilk, cheese, ice cream  Meats and beans: Smoked, cured, salted, or canned fish; meats or poultry such as ly, sausages, sardines; high-fat cuts of meat like beef, lamb, or pork; chicken with the skin on it  Fats: Cut back on solid fats like butter, lard, and margarine. Eat less food with high saturated fat, cholesterol and total fat.  Condiments and snacks: Salted and canned peas, beans, and olives; salted snack foods; fried foods; soda or other sweetened drinks  Sweets: High-fat baked goods such as muffins, donuts, pastries, commercial baked goods, candy bars  If you choose to drink alcohol, limit the amount you drink. Women should have 1 drink or less per day and men should have 2 drinks or less per day.  Helpful tips   Avoid eating canned vegetables and processed foods. These have a lot of salt in them. Look for a low-salt or low-sodium choice.  Try baking or broiling instead of frying food.  Write down the foods you eat. This will help you track what you have eaten each week.  When you go to a grocery store, have a list or a meal plan. Do not shop when you are hungry to avoid cravings for foods.  Read food labels with care. They will show you how much is in a serving. The amount is given as a percentage of the total amount you need each day. Reading labels will help you make healthy food choices.       Avoid fast foods.  Talk to your doctor or dietitian to see if you need vitamin and mineral supplements to help you balance your diet.  Talk to a dietitian for help.  Where can I learn more?   Academy of Nutrition and Dietetics  https://www.eatright.org/health/wellness/heart-and-cardiovascular-health/dash-diet-reducing-hypertension-through-diet-and-lifestyle    FamilyDoctor.org  http://familydoctor.org/familydoctor/en/prevention-wellness/food-nutrition/weight-loss/the-dash-diet-healthy-eating-to-control-your-blood-pressure.html   Last Reviewed Date   2021-03-15  Consumer Information Use and Disclaimer   This information is not specific medical advice and does not replace information you receive from your health care provider. This is only a brief summary of general information. It does NOT include all information about conditions, illnesses, injuries, tests, procedures, treatments, therapies, discharge instructions or life-style choices that may apply to you. You must talk with your health care provider for complete information about your health and treatment options. This information should not be used to decide whether or not to accept your health care providers advice, instructions or recommendations. Only your health care provider has the knowledge and training to provide advice that is right for you.  Copyright   Copyright © 2021 UpToDate, Inc. and its affiliates and/or licensors. All rights reserved.  Patient Education       Low Cholesterol, Saturated Fat, and Trans Fat Diet   About this topic   Cholesterol, saturated fat, and trans fat are in many foods. These may raise your blood cholesterol levels. If your cholesterol is too high, this can cause health problems in your heart, liver, kidneys, and even your eyes. The key to lowering your risk of heart problems is to lower your bad fat intake.  Saturated fats and trans fats are the bad fats. These fats clog your arteries and raise your bad cholesterol. Saturated fats and trans fats are solid fats at room temperature. Saturated fats are animal fats. Trans fats are manmade fats. They add flavor to a lot of packaged foods. Staying away from saturated and trans fats will help your heart.  When you do eat foods with fat, make sure they have the good fats. Monounsaturated and polyunsaturated fats are good fats. These  "fats help raise your good cholesterol and protect your heart.  General   How to Lower Fat and Cholesterol in Your Diet   Read the labels of the foods you buy from the market to find out how much fat is present. Under 5% of total fat on a label means it is "low fat". Over 20% of total fat on a label means it is high fat.  Eat high fiber foods, like soluble fiber. This type of fiber helps lower cholesterol in the body. Choose oatmeal, fruits (like apples), beans, and nuts to get the most soluble fiber.  Eat foods high in omega-3 fatty acids like petar seeds, walnuts, salmon, tuna, trout, herring, flaxseed, and soybeans. These foods help keep the heart healthy.  Limit your bad fat and oil intake.  Stay away from butter, stick margarine, shortening, lard, and palm and coconut oil. Pick plant-based spreads instead.  Limit mayonnaise, salad dressings, gravies, and sauces, unless it is made from low-fat ingredients.  Limit chocolate.  Do not eat high-fat processed foods like hot dogs, ly, sausage, ham and other luncheon meats high in fat, and some frozen foods. Pick fish, chicken, turkey, and lean meats instead.  Eat more dried beans, lentils, and tofu to get your protein.  Do not eat organ meats, like liver.  Choose nonfat or low-fat milk, yogurt, and cheese.  Use light or fat-free cream cheese and sour cream.  Eat lots of fruits and vegetables.  Pick whole grain breads, cereals, pastas, and rice.  Do not eat snacks that are high in fats like granola, cookies, pies, pastries, doughnuts, and croissants.  Stay away from deep fried foods.  Help When Cooking   Remove the fat portion of meats and the skin from poultry before cooking.  Bake, broil, grill, poach, or roast poultry, fish, and lean meats.  Drain and throw away the fat that drains out of meat as you cook it.  Try to add little or no fat to foods.  Use olive or canola oil for cooking or baking.  Steam your vegetables.  Use herbs or no-oil marinades to flavor " foods.         Who should use this diet?   This diet is for people who are at high risk of getting health problems like heart disease, high blood pressure, diabetes, and others. This diet is also good for all people to follow to keep your heart healthy.  What foods are good to eat?   Foods with good fats are:  Canola, peanut, and olive oil  Safflower, soybean, and corn oil  Walnuts, almonds, cashews, and peanuts  Pumpkin and sunflower seeds  [place] and tuna  Tofu  Soymilk  Avocado  What foods should be limited or avoided?   Stay away from these types of foods that have saturated fats:  Whole fat dairy products like cheese, ice cream, whole milk, and cream  Palm and coconut oils  High-fat meats like beef, lamb, poultry with the skin, ly, and sausage  Butter and lard  Stay away from these types of foods that may have trans fat:  Cookies, cakes, candy, doughnuts, baked goods, muffins, pizza dough, and pie crusts that are packaged  Fried foods  Frozen dinners  Chips and crackers  Microwave popcorn  Stick margarine and vegetable shortenings  Helpful tips   To help stay away from saturated fat:  Pick lean cuts of meat  Take the skin off chicken and turkey or pick skinless  Pick low-fat cheese, milk, and ice cream  Use liquid oils when cooking and baking, such as olive oil and canola oil  To help stay away from trans fat:  Look at your labels. Choose foods with 0% trans fat. Read the ingredient list. Avoid foods with partially hydrogenated oil in the ingredient list. This means there is trans fat in the product.  Where can I learn more?   American Heart Association   http://www.heart.org/HEARTORG/Conditions/Cholesterol/PreventionTreatmentofHighCholesterol/Know-Your-Fats_[facility]_305628_Article.jsp   Last Reviewed Date   2021-10-05  Consumer Information Use and Disclaimer   This information is not specific medical advice and does not replace information you receive from your health care provider. This is only a brief summary  of general information. It does NOT include all information about conditions, illnesses, injuries, tests, procedures, treatments, therapies, discharge instructions or life-style choices that may apply to you. You must talk with your health care provider for complete information about your health and treatment options. This information should not be used to decide whether or not to accept your health care providers advice, instructions or recommendations. Only your health care provider has the knowledge and training to provide advice that is right for you.   Copyright   Copyright © 2021 UpToDate, Inc. and its affiliates and/or licensors. All rights reserved.  Patient Education       Can Foods or Supplements Lower Cholesterol?   The Basics   Written by the doctors and editors at [employer]   Can I lower my cholesterol by changing my diet? -- Maybe. Some people are able to lower their cholesterol by changing their diet. While this does not always work, you can still improve your overall health by eating better.  If you have high cholesterol, it might help to avoid or limit red meat, butter, fried foods, cheese, and other foods that have a lot of saturated fat. Other things that might help lower cholesterol include:  Eating more soluble fiber - Soluble fiber is found in fruits, oats, barley, beans, and peas.  A vegetarian or vegan diet - A vegetarian diet contains no meat. A vegan diet contains no animal products at all, including meat, eggs, or milk.  Replacing meat with soy sometimes - Soy-based products include tofu and tempeh.  In general, you can improve your health by eating lots of fruits, vegetables, and whole grains. You can also cut back on carbohydrates, sweets, and processed foods.  What about eggs? -- Eggs are OK if you want to eat them, but don't overdo it. The news often has stories about the health benefits or risks of eggs. The truth is, eggs are a good source of protein and do not raise cholesterol  "much. Saturated fats (like in red meat, butter, and fried foods) affect cholesterol levels more than eggs do.  Are there specific foods that can lower my cholesterol? -- Maybe. There are some foods that seem to help lower cholesterol, including:  Foods rich in omega-3 fatty acids - Foods rich in omega-3 fatty acids include oily fish, and olive and canola oil. These foods seem to raise good cholesterol and might lower certain types of bad cholesterol. More important, studies show that people who eat lots of these foods are less likely than those who eat less of them to have heart disease. If you want, it's fine to eat 1 to 2 servings of oily fish a week (such as salmon, herring, or tuna).  Nuts - Some studies show that eating certain nuts, such as walnuts, almonds, and pistachios, can help lower cholesterol and even the risk of heart attack or death.  Fiber-rich foods - Fiber-rich foods, such as fruits, vegetables, beans, and oats, seem to lower cholesterol and are generally good for your health. Some doctors even recommend fiber supplements.  What about  foods that claim to lower cholesterol? -- Be careful with these foods. There are now many foods that have added plant extracts called "sterols" or "stanols." Examples include special margarines such as Benecol and Promise Activ. Foods with added sterols or stanols can lower cholesterol. But it's not clear whether those foods help reduce the risk of heart attack or stroke, or that they are safe to use long-term. Plus, research in animals shows that these extracts might actually cause health problems. Experts think more research is needed before they can recommend that people eat foods with added plant sterols or stanols.  Should I take supplements to lower my cholesterol? -- Maybe. Some research has shown that certain supplements can lower cholesterol. But there is almost no research showing that supplements can help prevent heart attacks, strokes, or any of " the problems caused by high cholesterol. If you decide to try supplements, keep in mind that in the [place], the government does not regulate supplements very well. That means that what's on a supplement's label is not always actually in the bottle.  Here are some supplements that might help with cholesterol:  Red yeast rice - This supplement can contain the same ingredient that is in a prescription medicine to lower cholesterol. Red yeast rice helps lower cholesterol, but the products that you can buy might not always have much of the active ingredient. If you are interested in taking red yeast rice for your cholesterol, you should speak with your doctor to see if the prescription medicine is a better choice.  Omega-3 fatty acid supplements - Some omega-3 fatty acid supplements, such as krill oil supplements, might help lower cholesterol.   What supplements don't work? -- There is no good evidence that calcium, garlic, coconut oil, coconut water, resveratrol, policosanol, or soy isoflavone supplements are helpful in lowering cholesterol.   All topics are updated as new evidence becomes available and our peer review process is complete.  This topic retrieved from [employer] on: Sep 21, 2021.  Topic 26757 Version 16.0  Release: 29.4.2 - C29.263  © 2021 UpToDate, Inc. and/or its affiliates. All rights reserved.  Consumer Information Use and Disclaimer   This information is not specific medical advice and does not replace information you receive from your health care provider. This is only a brief summary of general information. It does NOT include all information about conditions, illnesses, injuries, tests, procedures, treatments, therapies, discharge instructions or life-style choices that may apply to you. You must talk with your health care provider for complete information about your health and treatment options. This information should not be used to decide whether or not to accept your health care  provider's advice, instructions or recommendations. Only your health care provider has the knowledge and training to provide advice that is right for you. The use of this information is governed by the [employer] End User License Agreement, available at https://www.Invenias/en/solutions/V-Key/about/melecio.The use of smartclip content is governed by the smartclip Terms of Use. ©2021 UpToDate, Inc. All rights reserved.  Copyright   © 2021 UpToDate, Inc. and/or its affiliates. All rights reserved.

## 2022-04-18 NOTE — PROGRESS NOTES
[facility] - FAMILY MEDICINE       PATIENT NAME: Cj Thomas   : 1940    AGE: 81 y.o. DATE: 2022    MRN: [id number]        Reason for Visit / Chief Complaint:  Follow-up (Pt presents for 6 month HTN and HLD follow up. He is fasting.), Hypertension, and Hyperlipidemia     Subjective:     HPI:  Presents for 6 mth f/u HTN and HLD. Refuses to take a statin.  Reports he has been having gout in both of his great toes, but applying some salve has helped. Toe pain is much improved.    PHQ9 2022   Total Score 0       Review of Systems:     Review of Systems   Constitutional: Negative.    HENT: Negative.    Eyes: Negative.    Respiratory: Negative.    Cardiovascular: Negative.    Gastrointestinal: Negative.    Endocrine: Negative.    Genitourinary: Negative.    Musculoskeletal: Positive for arthralgias.   Skin: Negative.    Allergic/Immunologic: Negative.    Neurological: Negative.    Hematological: Negative.    Psychiatric/Behavioral: Negative.        Allergies and Medications:     Review of patient's allergies indicates:   Allergen Reactions    Niacin Swelling    Demerol [meperidine]     Iodine and iodide containing products     Statins-hmg-coa reductase inhibitors      Abnormal behavior. Body aches    Iodinated contrast media Rash        Current Outpatient Medications on File Prior to Visit   Medication Sig Dispense Refill    amLODIPine (NORVASC) 5 MG tablet Take 1 tablet (5 mg total) by mouth once daily. 90 tablet 1    omega-3 fatty acids/fish oil (FISH OIL-OMEGA-3 FATTY ACIDS) 300-1,000 mg capsule Take 2 capsules by mouth 2 (two) times a day.       tamsulosin (FLOMAX) 0.4 mg Cap Take 1 capsule (0.4 mg total) by mouth once daily. 90 capsule 3    triamcinolone acetonide 0.1% (KENALOG) 0.1 % ointment Apply topically 2 (two) times daily. 453.6 g 0    [DISCONTINUED] clopidogreL (PLAVIX) 75 mg tablet Take 1 tablet (75 mg total) by mouth  once daily. 90 tablet 3    [DISCONTINUED] losartan-hydrochlorothiazide 100-12.5 mg (HYZAAR) 100-12.5 mg Tab Take 1 tablet by mouth once daily. (Patient taking differently: Take 1 tablet by mouth once daily. Taking 0.5 tablet once daily) 90 tablet 1    [DISCONTINUED] azithromycin (Z-PAPI) 250 MG tablet Take 2 tablets by mouth on day 1; Take 1 tablet by mouth on days 2-5 (Patient not taking: No sig reported) 6 tablet 0    [DISCONTINUED] famotidine (PEPCID) 40 MG tablet Take 1 tablet (40 mg total) by mouth nightly as needed for Heartburn. (Patient not taking: Reported on 4/18/2022) 90 tablet 1    [DISCONTINUED] pantoprazole (PROTONIX) 40 MG tablet Take 40 mg by mouth once daily.      [DISCONTINUED] predniSONE (DELTASONE) 20 MG tablet 1 tablet by mouth BID x 5 days (Patient not taking: Reported on 4/18/2022) 10 tablet 0     Current Facility-Administered Medications on File Prior to Visit   Medication Dose Route Frequency Provider Last Rate Last Admin    [DISCONTINUED] acetaminophen tablet 650 mg  650 mg Oral Once PRN AR Friedman           Medical/Social/Family History:     Past Medical History:   Diagnosis Date    Hyperlipidemia     Hypertension     Stroke       Social History     Tobacco Use   Smoking Status Never Smoker   Smokeless Tobacco Never Used      Social History     Substance and Sexual Activity   Alcohol Use Not Currently       Family History   Problem Relation Age of Onset    Heart disease Mother     Heart disease Father     Hypertension Sister     Thyroid disease Daughter     Diabetes Son     No Known Problems Maternal Grandmother     No Known Problems Maternal Grandfather     Dementia Paternal Grandmother     No Known Problems Paternal Grandfather     Hypertension Sister     Hypertension Sister     Hypertension Sister     Hypertension Sister     No Known Problems Son       History reviewed. No pertinent surgical history.     Health Maintenance:     Immunization History  "  Administered Date(s) Administered    Influenza 01/21/2011, 10/01/2011, 11/01/2013    Influenza - High Dose - PF (65 years and older) 12/28/2006    Pneumococcal 02/28/2006    Pneumococcal Conjugate - 13 Valent 04/09/2018    Pneumococcal Polysaccharide - 23 Valent 02/28/2006, 06/20/2014, 04/01/2018    Td (Adult), Unspecified Formulation 07/01/2005    Tdap 11/20/2015, 09/07/2017      Health Maintenance Due   Topic Date Due    Eye Exam  Never done    Shingles Vaccine (1 of 2) Never done     Health Maintenance Topics with due status: Not Due       Topic Last Completion Date    TETANUS VACCINE 09/07/2017    Lipid Panel 02/03/2021       Objective:      Wt Readings from Last 3 Encounters:   04/18/22 0838 96.7 kg (213 lb 3.2 oz)   02/10/22 1409 97.5 kg (215 lb)   10/12/21 1108 98.4 kg (217 lb)     Vitals:    04/18/22 0838   BP: 124/76   BP Location: Right arm   Patient Position: Sitting   BP Method: Large (Manual)   Pulse: 70   Resp: 20   Temp: 97.2 °F (36.2 °C)   TempSrc: Temporal   SpO2: 98%   Weight: 96.7 kg (213 lb 3.2 oz)   Height: 5' 9" (1.753 m)     Body mass index is 31.48 kg/m².     Physical Exam:    Physical Exam  Vitals and nursing note reviewed.   Constitutional:       General: He is not in acute distress.     Appearance: Normal appearance. He is obese.   HENT:      Head: Normocephalic.      Right Ear: Tympanic membrane, ear canal and external ear normal.      Left Ear: Tympanic membrane, ear canal and external ear normal.      Nose: Nose normal.      Mouth/Throat:      Mouth: Mucous membranes are moist.      Pharynx: Oropharynx is clear.   Eyes:      Conjunctiva/sclera: Conjunctivae normal.      Pupils: Pupils are equal, round, and reactive to light.   Neck:      Thyroid: No thyromegaly.      Vascular: Normal carotid pulses. No carotid bruit.   Cardiovascular:      Rate and Rhythm: Normal rate and regular rhythm.      Pulses: Normal pulses.      Heart sounds: Normal heart sounds.   Pulmonary:      " Effort: Pulmonary effort is normal.      Breath sounds: Normal breath sounds.   Abdominal:      Palpations: Abdomen is soft.      Tenderness: There is no abdominal tenderness.   Musculoskeletal:      Cervical back: Neck supple.      Right lower leg: No edema.      Left lower leg: No edema.   Lymphadenopathy:      Cervical: No cervical adenopathy.   Skin:     General: Skin is warm and dry.      Capillary Refill: Capillary refill takes less than 2 seconds.   Neurological:      General: No focal deficit present.      Mental Status: He is alert and oriented to person, place, and time.   Psychiatric:         Mood and Affect: Mood normal.         Behavior: Behavior normal.          Assessment:          ICD-10-CM ICD-9-CM   1. Essential hypertension  I10 401.9   2. Mixed hyperlipidemia  E78.2 272.2   3. Statin declined  Z53.20 V64.2   4. History of CVA (cerebrovascular accident)  Z86.73 V12.54   5. Encounter for long-term (current) use of other medications  Z79.899 V58.69   6. Toe pain, bilateral  M79.674 729.5    M79.675         Plan:       Essential hypertension  -     losartan-hydrochlorothiazide 100-12.5 mg (HYZAAR) 100-12.5 mg Tab; Take 1/2 tablet by mouth daily for HTN.  Dispense: 45 tablet; Refill: 1    Mixed hyperlipidemia  -     Comprehensive Metabolic Panel; Future; Expected date: 04/18/2022  -     Lipid Panel; Future; Expected date: 04/18/2022    Statin declined    History of CVA (cerebrovascular accident)  -     clopidogreL (PLAVIX) 75 mg tablet; Take 1 tablet (75 mg total) by mouth once daily.  Dispense: 90 tablet; Refill: 3    Encounter for long-term (current) use of other medications  -     Comprehensive Metabolic Panel; Future; Expected date: 04/18/2022  -     CBC Auto Differential; Future; Expected date: 04/18/2022  -     TSH; Future; Expected date: 04/18/2022    Toe pain, bilateral  -     Uric Acid; Future; Expected date: 04/18/2022        Current Outpatient Medications:     amLODIPine (NORVASC) 5 MG  tablet, Take 1 tablet (5 mg total) by mouth once daily., Disp: 90 tablet, Rfl: 1    omega-3 fatty acids/fish oil (FISH OIL-OMEGA-3 FATTY ACIDS) 300-1,000 mg capsule, Take 2 capsules by mouth 2 (two) times a day. , Disp: , Rfl:     tamsulosin (FLOMAX) 0.4 mg Cap, Take 1 capsule (0.4 mg total) by mouth once daily., Disp: 90 capsule, Rfl: 3    triamcinolone acetonide 0.1% (KENALOG) 0.1 % ointment, Apply topically 2 (two) times daily., Disp: 453.6 g, Rfl: 0    clopidogreL (PLAVIX) 75 mg tablet, Take 1 tablet (75 mg total) by mouth once daily., Disp: 90 tablet, Rfl: 3    losartan-hydrochlorothiazide 100-12.5 mg (HYZAAR) 100-12.5 mg Tab, Take 1/2 tablet by mouth daily for HTN., Disp: 45 tablet, Rfl: 1  No current facility-administered medications for this visit.      New & refilled meds:  Requested Prescriptions     Signed Prescriptions Disp Refills    losartan-hydrochlorothiazide 100-12.5 mg (HYZAAR) 100-12.5 mg Tab 45 tablet 1     Sig: Take 1/2 tablet by mouth daily for HTN.    clopidogreL (PLAVIX) 75 mg tablet 90 tablet 3     Sig: Take 1 tablet (75 mg total) by mouth once daily.     Labs - will notify of results  Refill meds  Check uric acid level but advised his toe pain is likely due to arthritis flare-up since topical med helped.    F/u 6 mths for HTN, nonfasting and f/u as needed.      Future Appointments   Date Time Provider Department Center   10/20/2022  8:40 AM AR Friedman [facility] [address] [facility]        [facility]  [facility]    Date of encounter: 4/18/22

## 2022-04-28 DIAGNOSIS — E78.2 MIXED HYPERLIPIDEMIA: Primary | ICD-10-CM

## 2022-04-28 RX ORDER — EZETIMIBE 10 MG/1
10 TABLET ORAL DAILY
Qty: 90 TABLET | Refills: 3 | Status: SHIPPED | OUTPATIENT
Start: 2022-04-28 | End: 2023-01-31 | Stop reason: ALTCHOICE

## 2022-04-28 NOTE — TELEPHONE ENCOUNTER
----- Message from Gracy Garcia sent at 4/28/2022  1:01 PM CDT -----  the patient call and say he wanting  matthew to sent in [facility]  to [facility] [phone] Pt# [phone]

## 2022-07-18 ENCOUNTER — HOSPITAL ENCOUNTER (OUTPATIENT)
Dept: RADIOLOGY | Facility: HOSPITAL | Age: 82
Discharge: HOME | End: 2022-07-18
Attending: ORTHOPAEDIC SURGERY
Payer: MEDICARE

## 2022-07-18 DIAGNOSIS — M25.561 ACUTE PAIN OF RIGHT KNEE: ICD-10-CM

## 2022-07-18 PROBLEM — M17.11 PRIMARY OSTEOARTHRITIS OF RIGHT KNEE: Status: ACTIVE | Noted: 2022-07-18

## 2022-07-18 PROCEDURE — 73562 X-RAY EXAM OF KNEE 3: CPT | Mod: TC,RT

## 2022-08-17 DIAGNOSIS — M79.672 PAIN OF LEFT HEEL: Primary | ICD-10-CM

## 2022-08-18 ENCOUNTER — HOSPITAL ENCOUNTER (OUTPATIENT)
Dept: RADIOLOGY | Facility: HOSPITAL | Age: 82
Discharge: HOME | End: 2022-08-18
Attending: NURSE PRACTITIONER
Payer: MEDICARE

## 2022-08-18 DIAGNOSIS — M79.672 PAIN OF LEFT HEEL: ICD-10-CM

## 2022-08-18 PROBLEM — M72.2 PLANTAR FASCIITIS OF LEFT FOOT: Status: ACTIVE | Noted: 2022-08-18

## 2022-08-18 PROCEDURE — 73630 XR FOOT COMPLETE 3 VIEW LEFT: ICD-10-PCS | Mod: 26,LT,, | Performed by: RADIOLOGY

## 2022-08-18 PROCEDURE — 73630 X-RAY EXAM OF FOOT: CPT | Mod: 26,LT,, | Performed by: RADIOLOGY

## 2022-08-18 PROCEDURE — 73630 X-RAY EXAM OF FOOT: CPT | Mod: TC,LT

## 2022-08-29 DIAGNOSIS — I10 ESSENTIAL HYPERTENSION: ICD-10-CM

## 2022-08-29 RX ORDER — LOSARTAN POTASSIUM AND HYDROCHLOROTHIAZIDE 12.5; 1 MG/1; MG/1
TABLET ORAL
Qty: 45 TABLET | Refills: 1 | Status: SHIPPED | OUTPATIENT
Start: 2022-08-29 | End: 2023-06-12 | Stop reason: SDUPTHER

## 2022-08-29 RX ORDER — AMLODIPINE BESYLATE 5 MG/1
5 TABLET ORAL DAILY
Qty: 90 TABLET | Refills: 1 | Status: SHIPPED | OUTPATIENT
Start: 2022-08-29 | End: 2023-03-03 | Stop reason: SDUPTHER

## 2022-08-29 NOTE — TELEPHONE ENCOUNTER
----- Message from Jannie Retana sent at 8/29/2022  8:20 AM CDT -----  Patient needs a refill on amlodipine, losartan   called into [facility]  pharmacy at.   Please call patient at [phone]-- if you have any questions. Thanks!

## 2022-09-08 PROBLEM — M17.12 PRIMARY OSTEOARTHRITIS OF LEFT KNEE: Status: ACTIVE | Noted: 2022-09-08

## 2022-09-29 ENCOUNTER — HOSPITAL ENCOUNTER (OUTPATIENT)
Dept: RADIOLOGY | Facility: HOSPITAL | Age: 82
Discharge: HOME | End: 2022-09-29
Attending: NURSE PRACTITIONER
Payer: MEDICARE

## 2022-09-29 DIAGNOSIS — M17.0 PRIMARY OSTEOARTHRITIS OF BOTH KNEES: ICD-10-CM

## 2022-09-29 PROCEDURE — 73562 XR KNEE 3 VIEW LEFT: ICD-10-PCS | Mod: 26,LT,, | Performed by: RADIOLOGY

## 2022-09-29 PROCEDURE — 73562 X-RAY EXAM OF KNEE 3: CPT | Mod: TC,LT

## 2022-09-29 PROCEDURE — 73562 X-RAY EXAM OF KNEE 3: CPT | Mod: 26,LT,, | Performed by: RADIOLOGY

## 2022-10-09 DIAGNOSIS — Z71.89 COMPLEX CARE COORDINATION: ICD-10-CM

## 2022-10-17 PROBLEM — M25.562 ACUTE PAIN OF LEFT KNEE: Status: ACTIVE | Noted: 2022-10-17

## 2022-10-20 ENCOUNTER — OFFICE VISIT (OUTPATIENT)
Dept: FAMILY MEDICINE | Facility: CLINIC | Age: 82
End: 2022-10-20
Payer: MEDICARE

## 2022-10-20 VITALS
HEART RATE: 60 BPM | WEIGHT: 215 LBS | RESPIRATION RATE: 18 BRPM | DIASTOLIC BLOOD PRESSURE: 82 MMHG | HEIGHT: 69 IN | BODY MASS INDEX: 31.84 KG/M2 | OXYGEN SATURATION: 98 % | SYSTOLIC BLOOD PRESSURE: 138 MMHG | TEMPERATURE: 98 F

## 2022-10-20 DIAGNOSIS — E66.9 OBESITY, CLASS I, BMI 30-34.9: Chronic | ICD-10-CM

## 2022-10-20 DIAGNOSIS — E78.2 MIXED HYPERLIPIDEMIA: Chronic | ICD-10-CM

## 2022-10-20 DIAGNOSIS — I10 ESSENTIAL HYPERTENSION: Primary | Chronic | ICD-10-CM

## 2022-10-20 DIAGNOSIS — R07.89 CHEST DISCOMFORT: ICD-10-CM

## 2022-10-20 DIAGNOSIS — K21.9 GASTROESOPHAGEAL REFLUX DISEASE WITHOUT ESOPHAGITIS: Chronic | ICD-10-CM

## 2022-10-20 DIAGNOSIS — Z53.20 STATIN DECLINED: Chronic | ICD-10-CM

## 2022-10-20 PROBLEM — N40.0 BENIGN PROSTATIC HYPERPLASIA WITHOUT URINARY OBSTRUCTION: Status: ACTIVE | Noted: 2022-10-20

## 2022-10-20 PROBLEM — M17.0 PRIMARY OSTEOARTHRITIS OF BOTH KNEES: Chronic | Status: ACTIVE | Noted: 2022-09-29

## 2022-10-20 PROBLEM — U07.1 COVID-19: Status: RESOLVED | Noted: 2022-02-10 | Resolved: 2022-10-20

## 2022-10-20 PROBLEM — Z86.73 HISTORY OF CVA (CEREBROVASCULAR ACCIDENT): Chronic | Status: ACTIVE | Noted: 2022-02-10

## 2022-10-20 PROBLEM — N40.0 BENIGN PROSTATIC HYPERPLASIA WITHOUT URINARY OBSTRUCTION: Chronic | Status: ACTIVE | Noted: 2022-10-20

## 2022-10-20 PROCEDURE — 99214 PR OFFICE/OUTPT VISIT, EST, LEVL IV, 30-39 MIN: ICD-10-PCS | Mod: ,,, | Performed by: NURSE PRACTITIONER

## 2022-10-20 PROCEDURE — 99214 OFFICE O/P EST MOD 30 MIN: CPT | Mod: ,,, | Performed by: NURSE PRACTITIONER

## 2022-10-20 RX ORDER — DICLOFENAC SODIUM 10 MG/G
2 GEL TOPICAL 4 TIMES DAILY PRN
Status: ON HOLD | COMMUNITY
Start: 2022-05-11 | End: 2023-02-21 | Stop reason: HOSPADM

## 2022-10-20 RX ORDER — PANTOPRAZOLE SODIUM 40 MG/1
40 TABLET, DELAYED RELEASE ORAL DAILY
Qty: 90 TABLET | Refills: 0 | Status: SHIPPED | OUTPATIENT
Start: 2022-10-20 | End: 2024-01-09 | Stop reason: SDUPTHER

## 2022-10-20 NOTE — PROGRESS NOTES
[facility] - FAMILY MEDICINE       PATIENT NAME: Cj Thomas   : 1940    AGE: 82 y.o. DATE OF ENCOUNTER: 10/20/22    MRN: [id number]        Reason for Visit / Chief Complaint:  Hypertension (6mo fu) and Hyperlipidemia     Subjective:     HPI:    Presents with wife for 6 mth f/u HTN.   HLD - refuses to take statin; taking zetia.  PHQ-9 Score: 0    X 2-3 mths reports his chest burns after he walks about 200 yards and resolves w/ rest  No prior cardiac work-up.    Increased reflux lately - takes TUMs, pepcid, or baking soda prn.    Review of Systems:     Review of Systems   Constitutional: Negative.    HENT: Negative.     Eyes: Negative.    Respiratory: Negative.     Cardiovascular: Negative.    Gastrointestinal: Negative.    Endocrine: Negative.    Genitourinary: Negative.    Musculoskeletal:  Positive for arthralgias.   Skin: Negative.    Allergic/Immunologic: Negative.    Neurological: Negative.    Hematological: Negative.    Psychiatric/Behavioral: Negative.       Allergies:     Review of patient's allergies indicates:   Allergen Reactions    Niacin Swelling    Demerol [meperidine]     Iodine and iodide containing products     Statins-hmg-coa reductase inhibitors      Abnormal behavior. Body aches    Iodinated contrast media Rash        Labs:      Lipids:   Lab Results   Component Value Date    CHOL 240 (H) 2022     Lab Results   Component Value Date    HDL 43 2022     Lab Results   Component Value Date    LDLCALC 160 2022     Lab Results   Component Value Date    TRIG 187 (H) 2022     CMP:  Sodium   Date Value Ref Range Status   2022 138 136 - 145 mmol/L Final     Potassium   Date Value Ref Range Status   2022 4.8 3.5 - 5.1 mmol/L Final     Chloride   Date Value Ref Range Status   2022 102 98 - 107 mmol/L Final     Glucose   Date Value Ref Range Status   2022 85 74 - 106 mg/dL Final     BUN   Date Value Ref Range Status   2022 12 7 -  "18 mg/dL Final     Creatinine   Date Value Ref Range Status   04/18/2022 0.90 0.70 - 1.30 mg/dL Final     AST   Date Value Ref Range Status   04/18/2022 12 (L) 15 - 37 U/L Final     ALT   Date Value Ref Range Status   04/18/2022 25 16 - 61 U/L Final     eGFR   Date Value Ref Range Status   04/18/2022 86 >=60 mL/min/1.73m² Final      CBC:  Lab Results   Component Value Date    WBC 6.78 04/18/2022    RBC 4.82 04/18/2022    HGB 14.9 04/18/2022    HCT 45.3 04/18/2022     04/18/2022      TSH:  Lab Results   Component Value Date    TSH 1.740 04/18/2022       Medical History:     Past Medical History:   Diagnosis Date    COVID-19 2/10/2022    Hyperlipidemia     Hypertension     Stroke       Social History     Tobacco Use   Smoking Status Never   Smokeless Tobacco Never      History reviewed. No pertinent surgical history.     Health Maintenance:     Health Maintenance Due   Topic Date Due    Eye Exam  09/07/2022     Health Maintenance Topics with due status: Not Due       Topic Last Completion Date    TETANUS VACCINE 09/07/2017    Lipid Panel 04/18/2022       Objective:      Wt Readings from Last 3 Encounters:   10/20/22 0901 97.5 kg (215 lb)   04/18/22 0838 96.7 kg (213 lb 3.2 oz)   02/10/22 1409 97.5 kg (215 lb)     Vitals:    10/20/22 0901   BP: 138/82   BP Location: Left arm   Patient Position: Sitting   BP Method: Large (Manual)   Pulse: 60   Resp: 18   Temp: 98 °F (36.7 °C)   SpO2: 98%   Weight: 97.5 kg (215 lb)   Height: 5' 9" (1.753 m)     Body mass index is 31.75 kg/m².     Physical Exam:    Physical Exam  Vitals and nursing note reviewed.   Constitutional:       General: He is not in acute distress.     Appearance: Normal appearance.   HENT:      Head: Normocephalic.      Right Ear: Tympanic membrane, ear canal and external ear normal.      Left Ear: Tympanic membrane, ear canal and external ear normal.      Nose: Nose normal.      Mouth/Throat:      Mouth: Mucous membranes are moist.      Pharynx: " Oropharynx is clear.   Eyes:      Conjunctiva/sclera: Conjunctivae normal.      Pupils: Pupils are equal, round, and reactive to light.   Neck:      Thyroid: No thyromegaly.      Vascular: Normal carotid pulses. No carotid bruit.   Cardiovascular:      Rate and Rhythm: Normal rate and regular rhythm.      Pulses: Normal pulses.      Heart sounds: Normal heart sounds.   Pulmonary:      Effort: Pulmonary effort is normal. No respiratory distress.      Breath sounds: Normal breath sounds.   Musculoskeletal:      Cervical back: Neck supple.      Right lower leg: No edema.      Left lower leg: No edema.   Lymphadenopathy:      Cervical: No cervical adenopathy.   Skin:     General: Skin is warm and dry.   Neurological:      General: No focal deficit present.      Mental Status: He is alert and oriented to person, place, and time.   Psychiatric:         Mood and Affect: Mood normal.         Behavior: Behavior normal.        Assessment:          ICD-10-CM ICD-9-CM   1. Essential hypertension  I10 401.9   2. Mixed hyperlipidemia  E78.2 272.2   3. Statin declined  Z53.20 V64.2   4. Obesity, Class I, BMI 30-34.9  E66.9 278.00   5. Chest discomfort  R07.89 786.59   6. Gastroesophageal reflux disease without esophagitis  K21.9 530.81        Plan:       Essential hypertension  -     EKG 12-lead; Future  -     Echo; Future; Expected date: 10/27/2022  -     Ambulatory referral/consult to Cardiology; Future; Expected date: 10/27/2022    Mixed hyperlipidemia  -     EKG 12-lead; Future  -     Echo; Future; Expected date: 10/27/2022  -     Ambulatory referral/consult to Cardiology; Future; Expected date: 10/27/2022    Statin declined    Obesity, Class I, BMI 30-34.9    Chest discomfort  Comments:  x2-3 mths chest burns after walking about 200 yards and resolves with rest; EKG, echo, & cardiology referral ordered  Orders:  -     EKG 12-lead; Future  -     Echo; Future; Expected date: 10/27/2022  -     Ambulatory referral/consult to  Cardiology; Future; Expected date: 10/27/2022    Gastroesophageal reflux disease without esophagitis  Comments:  start protonix  Orders:  -     pantoprazole (PROTONIX) 40 MG tablet; Take 1 tablet (40 mg total) by mouth once daily.  Dispense: 90 tablet; Refill: 0      Current Outpatient Medications:     amLODIPine (NORVASC) 5 MG tablet, Take 1 tablet (5 mg total) by mouth once daily., Disp: 90 tablet, Rfl: 1    clopidogreL (PLAVIX) 75 mg tablet, Take 1 tablet (75 mg total) by mouth once daily., Disp: 90 tablet, Rfl: 3    diclofenac sodium (VOLTAREN) 1 % Gel, Apply 2 g topically 4 (four) times daily as needed., Disp: , Rfl:     ezetimibe (ZETIA) 10 mg tablet, Take 1 tablet (10 mg total) by mouth once daily., Disp: 90 tablet, Rfl: 3    losartan-hydrochlorothiazide 100-12.5 mg (HYZAAR) 100-12.5 mg Tab, Take 1/2 tablet by mouth daily for HTN., Disp: 45 tablet, Rfl: 1    omega-3 fatty acids/fish oil (FISH OIL-OMEGA-3 FATTY ACIDS) 300-1,000 mg capsule, Take 2 capsules by mouth 2 (two) times a day. , Disp: , Rfl:     tamsulosin (FLOMAX) 0.4 mg Cap, Take 1 capsule (0.4 mg total) by mouth once daily., Disp: 90 capsule, Rfl: 3    triamcinolone acetonide 0.1% (KENALOG) 0.1 % ointment, Apply topically 2 (two) times daily., Disp: 453.6 g, Rfl: 0    pantoprazole (PROTONIX) 40 MG tablet, Take 1 tablet (40 mg total) by mouth once daily., Disp: 90 tablet, Rfl: 0    Current Facility-Administered Medications:     sodium hyaluronate (supartz) injection 20 mg, 20 mg, Intra-articular, 1 time in Clinic/HOD, [facility] [place]    New & refilled meds:  Requested Prescriptions     Signed Prescriptions Disp Refills    pantoprazole (PROTONIX) 40 MG tablet 90 tablet 0     Sig: Take 1 tablet (40 mg total) by mouth once daily.     Declines COVID vaccine or flu shot.    EKG & echo at [facility] prior to appt w/ Dr. Zapata    Start protonix for increased reflux & given that chest discomfort is described as burning.  I have explained that  GERD does not cause exertional chest discomfort that resolves w/ rest and stressed the importance of cardiac work-up.    Is not fasting today.    STEFANIE for eye exam Dr. Garcia    Return to clinic 6 mths for HTN & HLD w/ fasting labs; and f/u as needed.    Future Appointments   Date Time Provider Department Center   11/10/2022 10:00 AM AWV NURSE, [facility] FAMILY MEDICINE [facility]   5/1/2023  9:20 AM AR Friedman [facility]        Signature:  AR Friedman

## 2022-10-24 ENCOUNTER — OFFICE VISIT (OUTPATIENT)
Dept: CARDIOLOGY | Facility: CLINIC | Age: 82
End: 2022-10-24
Payer: MEDICARE

## 2022-10-24 DIAGNOSIS — R07.89 CHEST DISCOMFORT: Primary | ICD-10-CM

## 2022-10-24 DIAGNOSIS — Z79.899 ENCOUNTER FOR LONG-TERM (CURRENT) USE OF OTHER MEDICATIONS: ICD-10-CM

## 2022-10-24 DIAGNOSIS — N40.0 BENIGN PROSTATIC HYPERPLASIA WITHOUT URINARY OBSTRUCTION: Chronic | ICD-10-CM

## 2022-10-24 DIAGNOSIS — K21.9 GASTROESOPHAGEAL REFLUX DISEASE WITHOUT ESOPHAGITIS: Chronic | ICD-10-CM

## 2022-10-24 DIAGNOSIS — R94.31 ABNORMAL EKG: ICD-10-CM

## 2022-10-24 DIAGNOSIS — Z86.73 HISTORY OF CVA (CEREBROVASCULAR ACCIDENT): Chronic | ICD-10-CM

## 2022-10-24 DIAGNOSIS — I10 ESSENTIAL HYPERTENSION: Chronic | ICD-10-CM

## 2022-10-24 DIAGNOSIS — E78.2 MIXED HYPERLIPIDEMIA: Chronic | ICD-10-CM

## 2022-10-24 DIAGNOSIS — M19.90 OSTEOARTHRITIS, UNSPECIFIED OSTEOARTHRITIS TYPE, UNSPECIFIED SITE: Chronic | ICD-10-CM

## 2022-10-24 PROBLEM — S83.242A ACUTE MEDIAL MENISCUS TEAR OF LEFT KNEE: Status: ACTIVE | Noted: 2022-10-24

## 2022-10-24 PROBLEM — M84.469A INSUFFICIENCY FRACTURE OF TIBIA: Status: ACTIVE | Noted: 2022-10-24

## 2022-10-24 PROCEDURE — 99203 PR OFFICE/OUTPT VISIT, NEW, LEVL III, 30-44 MIN: ICD-10-PCS | Mod: S$PBB,,, | Performed by: INTERNAL MEDICINE

## 2022-10-24 PROCEDURE — 93010 ELECTROCARDIOGRAM REPORT: CPT | Mod: S$PBB,,, | Performed by: INTERNAL MEDICINE

## 2022-10-24 PROCEDURE — 99203 OFFICE O/P NEW LOW 30 MIN: CPT | Mod: S$PBB,,, | Performed by: INTERNAL MEDICINE

## 2022-10-24 PROCEDURE — 99214 OFFICE O/P EST MOD 30 MIN: CPT | Mod: PBBFAC | Performed by: INTERNAL MEDICINE

## 2022-10-24 PROCEDURE — 93010 EKG 12-LEAD: ICD-10-PCS | Mod: S$PBB,,, | Performed by: INTERNAL MEDICINE

## 2022-10-24 PROCEDURE — 93005 ELECTROCARDIOGRAM TRACING: CPT | Mod: PBBFAC | Performed by: INTERNAL MEDICINE

## 2022-10-24 NOTE — PROGRESS NOTES
[facility] note        DATE OF SERVICE: 10/28/2022       PCP: AR Friedman      CHIEF COMPLAINT:   Chief Complaint   Patient presents with    Consult     Referred by AR Lin for chest discomfort, HTN, HLD    Chest Pain     C/o burning sensation in upper chest and neck. AR Lin prescribed him Protonix and denies have any chest discomfort since.        HISTORY OF PRESENT ILLNESS:  Cj Thomas is a 82 y.o. male with a PMH of   Past Medical History:   Diagnosis Date    COVID-19 2/10/2022    Hyperlipidemia     Hypertension     Stroke      who presents for   Chief Complaint   Patient presents with    Consult     Referred by AR Lin for chest discomfort, HTN, HLD    Chest Pain     C/o burning sensation in upper chest and neck. AR Lin prescribed him Protonix and denies have any chest discomfort since.          Review of Systems: Review of Systems   Respiratory:  Negative for shortness of breath.    Cardiovascular:  Negative for chest pain, palpitations and leg swelling.   Neurological:  Negative for loss of consciousness.      PAST MEDICAL HISTORY:  Past Medical History:   Diagnosis Date    COVID-19 2/10/2022    Hyperlipidemia     Hypertension     Stroke        PAST SURGICAL HISTORY:  History reviewed. No pertinent surgical history.    SOCIAL HISTORY:  Social History     Socioeconomic History    Marital status:     Number of children: 3   Tobacco Use    Smoking status: Never    Smokeless tobacco: Never   Substance and Sexual Activity    Alcohol use: Not Currently    Drug use: Never    Sexual activity: Yes       FAMILY HISTORY:  Family History   Problem Relation Age of Onset    Heart disease Mother     Heart disease Father     Hypertension Sister     Thyroid disease Daughter     Diabetes Son     No Known Problems Maternal Grandmother     No Known Problems Maternal Grandfather     Dementia Paternal Grandmother     No Known Problems Paternal Grandfather      Hypertension Sister     Hypertension Sister     Hypertension Sister     Hypertension Sister     No Known Problems Son          ALLERGIES:  Review of patient's allergies indicates:   Allergen Reactions    Niacin Swelling    Demerol [meperidine]     Iodine and iodide containing products     Statins-hmg-coa reductase inhibitors      Abnormal behavior. Body aches    Iodinated contrast media Rash        MEDICATIONS:    Current Outpatient Medications:     amLODIPine (NORVASC) 5 MG tablet, Take 1 tablet (5 mg total) by mouth once daily., Disp: 90 tablet, Rfl: 1    clopidogreL (PLAVIX) 75 mg tablet, Take 1 tablet (75 mg total) by mouth once daily., Disp: 90 tablet, Rfl: 3    diclofenac sodium (VOLTAREN) 1 % Gel, Apply 2 g topically 4 (four) times daily as needed., Disp: , Rfl:     ezetimibe (ZETIA) 10 mg tablet, Take 1 tablet (10 mg total) by mouth once daily., Disp: 90 tablet, Rfl: 3    losartan-hydrochlorothiazide 100-12.5 mg (HYZAAR) 100-12.5 mg Tab, Take 1/2 tablet by mouth daily for HTN., Disp: 45 tablet, Rfl: 1    omega-3 fatty acids/fish oil (FISH OIL-OMEGA-3 FATTY ACIDS) 300-1,000 mg capsule, Take 2 capsules by mouth 2 (two) times a day. , Disp: , Rfl:     pantoprazole (PROTONIX) 40 MG tablet, Take 1 tablet (40 mg total) by mouth once daily., Disp: 90 tablet, Rfl: 0    tamsulosin (FLOMAX) 0.4 mg Cap, Take 1 capsule (0.4 mg total) by mouth once daily., Disp: 90 capsule, Rfl: 3    triamcinolone acetonide 0.1% (KENALOG) 0.1 % ointment, Apply topically 2 (two) times daily., Disp: 453.6 g, Rfl: 0    Current Facility-Administered Medications:     sodium hyaluronate (supartz) injection 20 mg, 20 mg, Intra-articular, 1 time in Clinic/HOD, AR Dorado     PHYSICAL EXAM:  There were no vitals taken for this visit.  Wt Readings from Last 3 Encounters:   10/28/22 97.5 kg (215 lb)   10/20/22 97.5 kg (215 lb)   04/18/22 96.7 kg (213 lb 3.2 oz)      There is no height or weight on file to calculate BMI.    Physical  Exam  Vitals reviewed.   Constitutional:       Appearance: Normal appearance.   HENT:      Head: Normocephalic and atraumatic.   Neck:      Vascular: No carotid bruit or JVD.   Cardiovascular:      Rate and Rhythm: Normal rate and regular rhythm.      Pulses: Normal pulses.           Radial pulses are 2+ on the right side and 2+ on the left side.        Dorsalis pedis pulses are 2+ on the right side and 2+ on the left side.      Heart sounds: Normal heart sounds.   Pulmonary:      Effort: Pulmonary effort is normal.      Breath sounds: Normal breath sounds.   Musculoskeletal:      Right lower leg: No edema.      Left lower leg: No edema.   Skin:     General: Skin is warm and dry.   Neurological:      Mental Status: He is alert.     LABS REVIEWED:  Lab Results   Component Value Date    WBC 6.78 04/18/2022    RBC 4.82 04/18/2022    HGB 14.9 04/18/2022    HCT 45.3 04/18/2022    MCV 94.0 04/18/2022    MCH 30.9 04/18/2022    MCHC 32.9 04/18/2022    RDW 12.5 04/18/2022     04/18/2022    MPV 10.5 04/18/2022    NRBC 0.0 04/18/2022     Lab Results   Component Value Date     04/18/2022    K 4.8 04/18/2022     04/18/2022    CO2 31 04/18/2022    BUN 12 04/18/2022     Lab Results   Component Value Date    AST 12 (L) 04/18/2022    ALT 24 10/25/2022     Lab Results   Component Value Date    GLU 85 04/18/2022     Lab Results   Component Value Date    CHOL 209 (H) 10/25/2022    HDL 45 10/25/2022    TRIG 159 (H) 10/25/2022    CHOLHDL 4.6 10/25/2022       CARDIAC STUDIES REVIEWED:EKG: sinus rhythm with premature supraventricular complexes, nonspecific T wave abn, 64 bpm.         ASSESSMENT:  States chest pains have improved since starting Protonix.   Active Problem List with Overview Notes    Diagnosis Date Noted    Osteoarthritis 10/28/2022    Acute medial meniscus tear of left knee 10/24/2022    Insufficiency fracture of tibia 10/24/2022    Benign prostatic hyperplasia without urinary obstruction 10/20/2022     Chest discomfort 10/20/2022    Obesity, Class I, BMI 30-34.9     Acute pain of left knee 10/17/2022    Primary osteoarthritis of both knees 09/29/2022    Primary osteoarthritis of left knee 09/08/2022    Plantar fasciitis of left foot 08/18/2022    Primary osteoarthritis of right knee 07/18/2022    History of CVA (cerebrovascular accident) 02/10/2022    Gastroesophageal reflux disease without esophagitis 02/01/2022    Statin declined 10/12/2021    Mixed hyperlipidemia     Acute pain of left shoulder 08/17/2021    Essential hypertension 03/30/2021     VISIT DIAGNOSIS:  Chest discomfort  -     EKG 12-lead; Future    Essential hypertension  -     EKG 12-lead; Future  -     Ambulatory referral/consult to Cardiology  -     EKG 12-lead  -     NM Myocardial Perfusion Spect Multi Pharmacologic; Future; Expected date: 11/24/2022  -     Nuclear Stress Test; Future; Expected date: 11/24/2022  -     ALT (SGPT); Future; Expected date: 10/24/2022  -     Lipid Panel; Future; Expected date: 10/24/2022  -     X-Ray Chest PA And Lateral; Future; Expected date: 10/24/2022    Mixed hyperlipidemia  -     Ambulatory referral/consult to Cardiology  -     EKG 12-lead  -     NM Myocardial Perfusion Spect Multi Pharmacologic; Future; Expected date: 11/24/2022  -     Nuclear Stress Test; Future; Expected date: 11/24/2022  -     ALT (SGPT); Future; Expected date: 10/24/2022  -     Lipid Panel; Future; Expected date: 10/24/2022  -     X-Ray Chest PA And Lateral; Future; Expected date: 10/24/2022    Abnormal EKG  Comments:  x2-3 mths chest burns after walking about 200 yards and resolves with rest; EKG, echo, & cardiology referral ordered  Orders:  -     Ambulatory referral/consult to Cardiology  -     EKG 12-lead  -     NM Myocardial Perfusion Spect Multi Pharmacologic; Future; Expected date: 11/24/2022  -     Nuclear Stress Test; Future; Expected date: 11/24/2022  -     ALT (SGPT); Future; Expected date: 10/24/2022  -     Lipid Panel; Future;  Expected date: 10/24/2022  -     X-Ray Chest PA And Lateral; Future; Expected date: 10/24/2022    Encounter for long-term (current) use of other medications  -     ALT (SGPT); Future; Expected date: 10/24/2022    History of CVA (cerebrovascular accident)    Osteoarthritis, unspecified osteoarthritis type, unspecified site    Gastroesophageal reflux disease without esophagitis    Benign prostatic hyperplasia without urinary obstruction       PLAN:  Orders Placed This Encounter   Procedures    NM Myocardial Perfusion Spect Multi Pharmacologic     Standing Status:   Future     Number of Occurrences:   1     Standing Expiration Date:   12/24/2022     Order Specific Question:   May the Radiologist modify the order per protocol to meet the clinical needs of the patient?     Answer:   Yes     Order Specific Question:   Stress Medication to use:     Answer:   Regadenoson     Order Specific Question:   Diabetes?     Answer:   No     Order Specific Question:   Will a Cardiologist read this study?     Answer:   Yes    X-Ray Chest PA And Lateral     Standing Status:   Future     Number of Occurrences:   1     Standing Expiration Date:   10/24/2023     Order Specific Question:   May the Radiologist modify the order per protocol to meet the clinical needs of the patient?     Answer:   Yes     Order Specific Question:   Release to patient     Answer:   Immediate    ALT (SGPT)     Standing Status:   Future     Number of Occurrences:   1     Standing Expiration Date:   12/23/2023    Lipid Panel     Standing Status:   Future     Number of Occurrences:   1     Standing Expiration Date:   12/23/2023    Nuclear Stress Test     Standing Status:   Future     Number of Occurrences:   1     Standing Expiration Date:   12/24/2022     Order Specific Question:   Which stress agent will be used?     Answer:   Pharm     Order Specific Question:   Which medicaton for the stress procedure?     Answer:   Regadenoson     Order Specific Question:    Physician to read study:     Answer:   ALEAH MAY [[id number]]     Order Specific Question:   Release to patient     Answer:   Immediate    EKG 12-lead     Standing Status:   Future     Number of Occurrences:   1     Standing Expiration Date:   10/24/2023      RTC after tests.

## 2022-10-25 ENCOUNTER — HOSPITAL ENCOUNTER (OUTPATIENT)
Dept: RADIOLOGY | Facility: HOSPITAL | Age: 82
Discharge: HOME | End: 2022-10-25
Attending: INTERNAL MEDICINE
Payer: MEDICARE

## 2022-10-25 DIAGNOSIS — R94.31 ABNORMAL EKG: ICD-10-CM

## 2022-10-25 DIAGNOSIS — I10 ESSENTIAL HYPERTENSION: ICD-10-CM

## 2022-10-25 DIAGNOSIS — E78.2 MIXED HYPERLIPIDEMIA: ICD-10-CM

## 2022-10-25 PROCEDURE — 71046 XR CHEST PA AND LATERAL: ICD-10-PCS | Mod: 26,,, | Performed by: RADIOLOGY

## 2022-10-25 PROCEDURE — 71046 X-RAY EXAM CHEST 2 VIEWS: CPT | Mod: TC

## 2022-10-25 PROCEDURE — 71046 X-RAY EXAM CHEST 2 VIEWS: CPT | Mod: 26,,, | Performed by: RADIOLOGY

## 2022-10-28 ENCOUNTER — HOSPITAL ENCOUNTER (OUTPATIENT)
Dept: CARDIOLOGY | Facility: HOSPITAL | Age: 82
Discharge: HOME | End: 2022-10-28
Attending: NURSE PRACTITIONER
Payer: MEDICARE

## 2022-10-28 ENCOUNTER — HOSPITAL ENCOUNTER (OUTPATIENT)
Dept: RADIOLOGY | Facility: HOSPITAL | Age: 82
Discharge: HOME | End: 2022-10-28
Attending: INTERNAL MEDICINE
Payer: MEDICARE

## 2022-10-28 ENCOUNTER — HOSPITAL ENCOUNTER (OUTPATIENT)
Dept: CARDIOLOGY | Facility: HOSPITAL | Age: 82
Discharge: HOME | End: 2022-10-28
Attending: INTERNAL MEDICINE
Payer: MEDICARE

## 2022-10-28 VITALS — HEIGHT: 69 IN | WEIGHT: 215 LBS | BODY MASS INDEX: 31.84 KG/M2

## 2022-10-28 DIAGNOSIS — E78.2 MIXED HYPERLIPIDEMIA: ICD-10-CM

## 2022-10-28 DIAGNOSIS — R07.89 CHEST DISCOMFORT: ICD-10-CM

## 2022-10-28 DIAGNOSIS — R94.31 ABNORMAL EKG: ICD-10-CM

## 2022-10-28 DIAGNOSIS — I10 ESSENTIAL HYPERTENSION: ICD-10-CM

## 2022-10-28 DIAGNOSIS — E78.2 MIXED HYPERLIPIDEMIA: Chronic | ICD-10-CM

## 2022-10-28 DIAGNOSIS — I10 ESSENTIAL HYPERTENSION: Chronic | ICD-10-CM

## 2022-10-28 PROBLEM — M19.90 OSTEOARTHRITIS: Chronic | Status: ACTIVE | Noted: 2022-10-28

## 2022-10-28 PROCEDURE — 93306 TTE W/DOPPLER COMPLETE: CPT | Mod: 26,,, | Performed by: INTERNAL MEDICINE

## 2022-10-28 PROCEDURE — 93306 ECHO (CUPID ONLY): ICD-10-PCS | Mod: 26,,, | Performed by: INTERNAL MEDICINE

## 2022-10-28 PROCEDURE — 93306 TTE W/DOPPLER COMPLETE: CPT

## 2022-10-31 ENCOUNTER — HOSPITAL ENCOUNTER (OUTPATIENT)
Dept: CARDIOLOGY | Facility: HOSPITAL | Age: 82
Discharge: HOME | End: 2022-10-31
Attending: INTERNAL MEDICINE
Payer: MEDICARE

## 2022-10-31 ENCOUNTER — HOSPITAL ENCOUNTER (OUTPATIENT)
Dept: RADIOLOGY | Facility: HOSPITAL | Age: 82
Discharge: HOME | End: 2022-10-31
Attending: INTERNAL MEDICINE
Payer: MEDICARE

## 2022-10-31 VITALS
HEIGHT: 69 IN | HEART RATE: 55 BPM | DIASTOLIC BLOOD PRESSURE: 103 MMHG | SYSTOLIC BLOOD PRESSURE: 152 MMHG | WEIGHT: 205 LBS | BODY MASS INDEX: 30.36 KG/M2

## 2022-10-31 LAB
CV STRESS BASE HR: 55 BPM
DIASTOLIC BLOOD PRESSURE: 103 MMHG
OHS CV CPX 85 PERCENT MAX PREDICTED HEART RATE MALE: 118
OHS CV CPX MAX PREDICTED HEART RATE: 139
OHS CV CPX PATIENT IS FEMALE: 0
OHS CV CPX PATIENT IS MALE: 1
OHS CV CPX PEAK DIASTOLIC BLOOD PRESSURE: 103 MMHG
OHS CV CPX PEAK HEAR RATE: 70 BPM
OHS CV CPX PEAK RATE PRESSURE PRODUCT: NORMAL
OHS CV CPX PEAK SYSTOLIC BLOOD PRESSURE: 155 MMHG
OHS CV CPX PERCENT MAX PREDICTED HEART RATE ACHIEVED: 50
OHS CV CPX RATE PRESSURE PRODUCT PRESENTING: 8525
SYSTOLIC BLOOD PRESSURE: 155 MMHG

## 2022-10-31 PROCEDURE — 93016 CV STRESS TEST SUPVJ ONLY: CPT | Mod: ,,, | Performed by: NURSE PRACTITIONER

## 2022-10-31 PROCEDURE — 63600175 PHARM REV CODE 636 W HCPCS: Performed by: INTERNAL MEDICINE

## 2022-10-31 PROCEDURE — 93018 CV STRESS TEST I&R ONLY: CPT | Mod: ,,, | Performed by: INTERNAL MEDICINE

## 2022-10-31 PROCEDURE — A9500 TC99M SESTAMIBI: HCPCS

## 2022-10-31 PROCEDURE — 93018 NUCLEAR STRESS TEST (CUPID ONLY): ICD-10-PCS | Mod: ,,, | Performed by: INTERNAL MEDICINE

## 2022-10-31 PROCEDURE — 93016 NUCLEAR STRESS TEST (CUPID ONLY): ICD-10-PCS | Mod: ,,, | Performed by: NURSE PRACTITIONER

## 2022-10-31 PROCEDURE — 93017 CV STRESS TEST TRACING ONLY: CPT

## 2022-10-31 PROCEDURE — 78452 HT MUSCLE IMAGE SPECT MULT: CPT | Mod: 26,,, | Performed by: INTERNAL MEDICINE

## 2022-10-31 PROCEDURE — 78452 NM MYOCARDIAL PERFUSION SPECT MULTI PHARM: ICD-10-PCS | Mod: 26,,, | Performed by: INTERNAL MEDICINE

## 2022-10-31 RX ORDER — REGADENOSON 0.08 MG/ML
0.4 INJECTION, SOLUTION INTRAVENOUS ONCE
Status: COMPLETED | OUTPATIENT
Start: 2022-10-31 | End: 2022-10-31

## 2022-10-31 RX ADMIN — REGADENOSON 0.4 MG: 0.08 INJECTION, SOLUTION INTRAVENOUS at 09:10

## 2022-11-09 ENCOUNTER — OFFICE VISIT (OUTPATIENT)
Dept: CARDIOLOGY | Facility: CLINIC | Age: 82
End: 2022-11-09
Payer: MEDICARE

## 2022-11-09 VITALS
HEART RATE: 66 BPM | DIASTOLIC BLOOD PRESSURE: 78 MMHG | RESPIRATION RATE: 18 BRPM | SYSTOLIC BLOOD PRESSURE: 134 MMHG | WEIGHT: 216 LBS | HEIGHT: 69 IN | BODY MASS INDEX: 31.99 KG/M2

## 2022-11-09 DIAGNOSIS — Z01.812 PRE-OPERATIVE LABORATORY EXAMINATION: ICD-10-CM

## 2022-11-09 DIAGNOSIS — R07.9 CHEST PAIN, UNSPECIFIED TYPE: Primary | ICD-10-CM

## 2022-11-09 DIAGNOSIS — I10 ESSENTIAL HYPERTENSION: Chronic | ICD-10-CM

## 2022-11-09 DIAGNOSIS — R93.1 ABNORMAL ECHOCARDIOGRAM: ICD-10-CM

## 2022-11-09 DIAGNOSIS — R94.39 ABNORMAL CARDIOVASCULAR STRESS TEST: ICD-10-CM

## 2022-11-09 DIAGNOSIS — E78.2 MIXED HYPERLIPIDEMIA: Chronic | ICD-10-CM

## 2022-11-09 LAB
AORTIC VALVE CUSP SEPERATION: 2.05 CM
AV INDEX (PROSTH): 0.93
AV MEAN GRADIENT: 3 MMHG
AV PEAK GRADIENT: 7 MMHG
AV VALVE AREA: 3.04 CM2
AV VELOCITY RATIO: 0.77
BSA FOR ECHO PROCEDURE: 2.18 M2
CV ECHO LV RWT: 0.45 CM
DOP CALC AO PEAK VEL: 1.3 M/S
DOP CALC AO VTI: 27.41 CM
DOP CALC LVOT AREA: 3.3 CM2
DOP CALC LVOT DIAMETER: 2.04 CM
DOP CALC LVOT PEAK VEL: 1 M/S
DOP CALC LVOT STROKE VOLUME: 83.21 CM3
DOP CALCLVOT PEAK VEL VTI: 25.47 CM
E WAVE DECELERATION TIME: 208 MSEC
ECHO LV POSTERIOR WALL: 1.02 CM (ref 0.6–1.1)
EJECTION FRACTION: 50 %
FRACTIONAL SHORTENING: 26 % (ref 28–44)
INTERVENTRICULAR SEPTUM: 1.51 CM (ref 0.6–1.1)
LEFT ATRIUM SIZE: 3.9 CM
LEFT INTERNAL DIMENSION IN SYSTOLE: 3.32 CM (ref 2.1–4)
LEFT VENTRICLE DIASTOLIC VOLUME INDEX: 43.17 ML/M2
LEFT VENTRICLE DIASTOLIC VOLUME: 91.96 ML
LEFT VENTRICLE MASS INDEX: 100 G/M2
LEFT VENTRICLE SYSTOLIC VOLUME INDEX: 21 ML/M2
LEFT VENTRICLE SYSTOLIC VOLUME: 44.78 ML
LEFT VENTRICULAR INTERNAL DIMENSION IN DIASTOLE: 4.49 CM (ref 3.5–6)
LEFT VENTRICULAR MASS: 213.12 G
LVOT MG: 1.8 MMHG
MV PEAK E VEL: 0.65 M/S
PISA TR MAX VEL: 2.64 M/S
RIGHT ATRIAL AREA: 9.08 CM2
RIGHT VENTRICULAR END-DIASTOLIC DIMENSION: 2.79 CM
TR MAX PG: 28 MMHG
TRICUSPID ANNULAR PLANE SYSTOLIC EXCURSION: 1.98 CM

## 2022-11-09 PROCEDURE — 99214 OFFICE O/P EST MOD 30 MIN: CPT | Mod: S$PBB,,, | Performed by: INTERNAL MEDICINE

## 2022-11-09 PROCEDURE — 99214 PR OFFICE/OUTPT VISIT, EST, LEVL IV, 30-39 MIN: ICD-10-PCS | Mod: S$PBB,,, | Performed by: INTERNAL MEDICINE

## 2022-11-09 PROCEDURE — 99213 OFFICE O/P EST LOW 20 MIN: CPT | Mod: PBBFAC | Performed by: INTERNAL MEDICINE

## 2022-11-10 PROBLEM — R93.1 ABNORMAL ECHOCARDIOGRAM: Status: ACTIVE | Noted: 2022-11-10

## 2022-11-10 PROBLEM — R07.9 CHEST PAIN: Status: ACTIVE | Noted: 2022-11-10

## 2022-11-10 PROBLEM — R94.39 ABNORMAL CARDIOVASCULAR STRESS TEST: Status: ACTIVE | Noted: 2022-11-10

## 2022-11-10 RX ORDER — SODIUM CHLORIDE 450 MG/100ML
INJECTION, SOLUTION INTRAVENOUS CONTINUOUS
Status: CANCELLED | OUTPATIENT
Start: 2022-11-15

## 2022-11-10 RX ORDER — DIAZEPAM 2 MG/1
5 TABLET ORAL
Status: CANCELLED | OUTPATIENT
Start: 2022-11-15

## 2022-11-10 RX ORDER — DIPHENHYDRAMINE HCL 25 MG
50 CAPSULE ORAL
Status: CANCELLED | OUTPATIENT
Start: 2022-11-15

## 2022-11-10 NOTE — H&P (VIEW-ONLY)
[facility] note        DATE OF SERVICE: 11/10/2022       PCP: AR Friedman      CHIEF COMPLAINT:   Chief Complaint   Patient presents with    Results     Denies any cardiac complaints.         HISTORY OF PRESENT ILLNESS:  Cj Thomas is a 82 y.o. male with a PMH of   Past Medical History:   Diagnosis Date    Benign prostate hyperplasia     COVID-19 02/10/2022    GERD (gastroesophageal reflux disease)     Hyperlipidemia     Hypertension     Osteoarthritis     Stroke      who presents for   Chief Complaint   Patient presents with    Results     Denies any cardiac complaints.           Review of Systems: Review of Systems   Respiratory:  Negative for shortness of breath.    Cardiovascular:  Negative for chest pain, palpitations and leg swelling.   Neurological:  Negative for loss of consciousness.      PAST MEDICAL HISTORY:  Past Medical History:   Diagnosis Date    Benign prostate hyperplasia     COVID-19 02/10/2022    GERD (gastroesophageal reflux disease)     Hyperlipidemia     Hypertension     Osteoarthritis     Stroke        PAST SURGICAL HISTORY:  Past Surgical History:   Procedure Laterality Date    TONSILLECTOMY      TOTAL KNEE ARTHROPLASTY Right        SOCIAL HISTORY:  Social History     Socioeconomic History    Marital status:     Number of children: 3   Tobacco Use    Smoking status: Never    Smokeless tobacco: Never   Substance and Sexual Activity    Alcohol use: Not Currently    Drug use: Never    Sexual activity: Yes       FAMILY HISTORY:  Family History   Problem Relation Age of Onset    Heart disease Mother     Heart disease Father     Hypertension Sister     Thyroid disease Daughter     Diabetes Son     No Known Problems Maternal Grandmother     No Known Problems Maternal Grandfather     Dementia Paternal Grandmother     No Known Problems Paternal Grandfather     Hypertension Sister     Hypertension Sister     Hypertension Sister     Hypertension Sister     No Known  "Problems Son          ALLERGIES:  Review of patient's allergies indicates:   Allergen Reactions    Niacin Swelling    Demerol [meperidine]     Iodine and iodide containing products     Statins-hmg-coa reductase inhibitors      Abnormal behavior. Body aches    Iodinated contrast media Rash        MEDICATIONS:    Current Outpatient Medications:     amLODIPine (NORVASC) 5 MG tablet, Take 1 tablet (5 mg total) by mouth once daily., Disp: 90 tablet, Rfl: 1    clopidogreL (PLAVIX) 75 mg tablet, Take 1 tablet (75 mg total) by mouth once daily., Disp: 90 tablet, Rfl: 3    diclofenac sodium (VOLTAREN) 1 % Gel, Apply 2 g topically 4 (four) times daily as needed., Disp: , Rfl:     ezetimibe (ZETIA) 10 mg tablet, Take 1 tablet (10 mg total) by mouth once daily., Disp: 90 tablet, Rfl: 3    losartan-hydrochlorothiazide 100-12.5 mg (HYZAAR) 100-12.5 mg Tab, Take 1/2 tablet by mouth daily for HTN., Disp: 45 tablet, Rfl: 1    omega-3 fatty acids/fish oil (FISH OIL-OMEGA-3 FATTY ACIDS) 300-1,000 mg capsule, Take 2 capsules by mouth 2 (two) times a day. , Disp: , Rfl:     pantoprazole (PROTONIX) 40 MG tablet, Take 1 tablet (40 mg total) by mouth once daily., Disp: 90 tablet, Rfl: 0    tamsulosin (FLOMAX) 0.4 mg Cap, Take 1 capsule (0.4 mg total) by mouth once daily., Disp: 90 capsule, Rfl: 3    triamcinolone acetonide 0.1% (KENALOG) 0.1 % ointment, Apply topically 2 (two) times daily., Disp: 453.6 g, Rfl: 0    Current Facility-Administered Medications:     sodium hyaluronate (supartz) injection 20 mg, 20 mg, Intra-articular, 1 time in Clinic/HOD, [facility]     PHYSICAL EXAM:  /78 (BP Location: Left arm, Patient Position: Sitting)   Pulse 66   Resp 18   Ht 5' 9" (1.753 m)   Wt 98 kg (216 lb)   BMI 31.90 kg/m²   Wt Readings from Last 3 Encounters:   11/09/22 98 kg (216 lb)   10/31/22 93 kg (205 lb)   10/28/22 97.5 kg (215 lb)      Body mass index is 31.9 kg/m².    Physical Exam  Vitals reviewed.   Constitutional: "       Appearance: Normal appearance.   HENT:      Head: Normocephalic and atraumatic.   Neck:      Vascular: No carotid bruit or JVD.   Cardiovascular:      Rate and Rhythm: Normal rate and regular rhythm.      Pulses: Normal pulses.           Radial pulses are 2+ on the right side and 2+ on the left side.        Dorsalis pedis pulses are 2+ on the right side and 2+ on the left side.      Heart sounds: Normal heart sounds.   Pulmonary:      Effort: Pulmonary effort is normal.      Breath sounds: Normal breath sounds.   Musculoskeletal:      Right lower leg: No edema.      Left lower leg: No edema.   Skin:     General: Skin is warm and dry.   Neurological:      Mental Status: He is alert.     LABS REVIEWED:  Lab Results   Component Value Date    WBC 8.08 11/09/2022    RBC 4.92 11/09/2022    HGB 15.6 11/09/2022    HCT 45.0 11/09/2022    MCV 91.5 11/09/2022    MCH 31.7 (H) 11/09/2022    MCHC 34.7 11/09/2022    RDW 12.1 11/09/2022     11/09/2022    MPV 9.6 11/09/2022    NRBC 0.0 11/09/2022     Lab Results   Component Value Date     (L) 11/09/2022    K 4.0 11/09/2022     11/09/2022    CO2 30 11/09/2022    BUN 10 11/09/2022     Lab Results   Component Value Date    AST 12 (L) 04/18/2022    ALT 24 10/25/2022     Lab Results   Component Value Date     11/09/2022     Lab Results   Component Value Date    CHOL 209 (H) 10/25/2022    HDL 45 10/25/2022    TRIG 159 (H) 10/25/2022    CHOLHDL 4.6 10/25/2022       CARDIAC STUDIES REVIEWED:  Results for orders placed during the hospital encounter of 10/28/22    Echo    Interpretation Summary  · The left ventricle is normal in size with concentric remodeling and low normal systolic function.  · The estimated ejection fraction is 50%.  · Left ventricular diastolic dysfunction.  · Normal right ventricular size with normal right ventricular systolic function.  · Mild mitral regurgitation.  · Mild tricuspid regurgitation.  · Mild pulmonic regurgitation.  · There  are segmental left ventricular wall motion abnormalities.  Results for orders placed during the hospital encounter of 10/28/22    Nuclear Stress Test       1. Very large anterior and septal wall perfusion defect extending to the apex in stress images which partially improves in rest images and is consistent with an area of ischemia and scar.  2. Normal left ventricular size with hypokinesis of the distal 1/2 of the interventricular septum and overall mildly impaired left ventricular systolic function, ejection fraction 43%.        ASSESSMENT:   Active Problem List with Overview Notes    Diagnosis Date Noted    Abnormal echocardiogram 11/10/2022    Chest pain 11/10/2022     remote      Abnormal cardiovascular stress test 11/10/2022    Osteoarthritis 10/28/2022    Acute medial meniscus tear of left knee 10/24/2022    Insufficiency fracture of tibia 10/24/2022    Benign prostatic hyperplasia without urinary obstruction 10/20/2022    Chest discomfort 10/20/2022    Obesity, Class I, BMI 30-34.9     Acute pain of left knee 10/17/2022    Primary osteoarthritis of both knees 09/29/2022    Primary osteoarthritis of left knee 09/08/2022    Plantar fasciitis of left foot 08/18/2022    Primary osteoarthritis of right knee 07/18/2022    History of CVA (cerebrovascular accident) 02/10/2022    Gastroesophageal reflux disease without esophagitis 02/01/2022    Statin declined 10/12/2021    Mixed hyperlipidemia     Acute pain of left shoulder 08/17/2021    Essential hypertension 03/30/2021     VISIT DIAGNOSIS:  Chest pain, unspecified type  Comments:  remote  Orders:  -     Case Request-Cath Lab: Left heart cath; Standing  -     Height and weight; Standing  -     Clip and Prep Left Groin, Right Groin, Right Radial; Standing  -     Verify informed consent; Standing  -     Vital signs; Standing  -     Cardiac Monitoring - Adult; Standing  -     Pulse Oximetry Q4H; Standing  -     Diet NPO; Standing  -     Full code; Standing  -      diphenhydrAMINE capsule 50 mg  -     diazePAM tablet 6 mg  -     Insert peripheral IV; Standing  -     0.45% NaCl infusion  -     Place in Outpatient; Standing    Pre-operative laboratory examination  -     Basic Metabolic Panel; Future; Expected date: 11/09/2022  -     CBC Auto Differential; Future; Expected date: 11/09/2022    Abnormal cardiovascular stress test  -     Basic Metabolic Panel; Future; Expected date: 11/09/2022  -     CBC Auto Differential; Future; Expected date: 11/09/2022  -     Case Request-Cath Lab: Left heart cath; Standing  -     Height and weight; Standing  -     Clip and Prep Left Groin, Right Groin, Right Radial; Standing  -     Verify informed consent; Standing  -     Vital signs; Standing  -     Cardiac Monitoring - Adult; Standing  -     Pulse Oximetry Q4H; Standing  -     Diet NPO; Standing  -     Full code; Standing  -     diphenhydrAMINE capsule 50 mg  -     diazePAM tablet 6 mg  -     Insert peripheral IV; Standing  -     0.45% NaCl infusion  -     Place in Outpatient; Standing    Abnormal echocardiogram  -     Height and weight; Standing  -     Clip and Prep Left Groin, Right Groin, Right Radial; Standing  -     Verify informed consent; Standing  -     Vital signs; Standing  -     Cardiac Monitoring - Adult; Standing  -     Pulse Oximetry Q4H; Standing  -     Diet NPO; Standing  -     Full code; Standing  -     diphenhydrAMINE capsule 50 mg  -     diazePAM tablet 6 mg  -     Insert peripheral IV; Standing  -     0.45% NaCl infusion  -     Place in Outpatient; Standing    Essential hypertension    Mixed hyperlipidemia       PLAN:  Reviewed test results with patient and wife.  Start ASA 81 mg one po qd  C with poss pci (wrist case).  Risks, benefits, and alternative were explained.    Orders Placed This Encounter   Procedures    Basic Metabolic Panel     Standing Status:   Future     Number of Occurrences:   1     Standing Expiration Date:   1/8/2024    CBC Auto Differential      Standing Status:   Future     Number of Occurrences:   1     Standing Expiration Date:   1/8/2024    Case Request-Cath Lab: Left heart cath     Standing Status:   Standing     Number of Occurrences:   1     Order Specific Question:   CPT Code:     Answer:   OK CATH PLACE/CORON ANGIO, IMG SUPER/INTERP,W LEFT HEART VENTRICULOGRAPHY [04023]     Order Specific Question:   Medical Necessity:     Answer:   Medically Non-Urgent [100]     Order Specific Question:   Is an on-site pathologist required for this procedure?     Answer:   N/A      RTC after [facility].

## 2022-11-10 NOTE — PROGRESS NOTES
[facility] note        DATE OF SERVICE: 11/10/2022       PCP: AR Friedman      CHIEF COMPLAINT:   Chief Complaint   Patient presents with    Results     Denies any cardiac complaints.         HISTORY OF PRESENT ILLNESS:  Cj Thomas is a 82 y.o. male with a PMH of   Past Medical History:   Diagnosis Date    Benign prostate hyperplasia     COVID-19 02/10/2022    GERD (gastroesophageal reflux disease)     Hyperlipidemia     Hypertension     Osteoarthritis     Stroke      who presents for   Chief Complaint   Patient presents with    Results     Denies any cardiac complaints.           Review of Systems: Review of Systems   Respiratory:  Negative for shortness of breath.    Cardiovascular:  Negative for chest pain, palpitations and leg swelling.   Neurological:  Negative for loss of consciousness.      PAST MEDICAL HISTORY:  Past Medical History:   Diagnosis Date    Benign prostate hyperplasia     COVID-19 02/10/2022    GERD (gastroesophageal reflux disease)     Hyperlipidemia     Hypertension     Osteoarthritis     Stroke        PAST SURGICAL HISTORY:  Past Surgical History:   Procedure Laterality Date    TONSILLECTOMY      TOTAL KNEE ARTHROPLASTY Right        SOCIAL HISTORY:  Social History     Socioeconomic History    Marital status:     Number of children: 3   Tobacco Use    Smoking status: Never    Smokeless tobacco: Never   Substance and Sexual Activity    Alcohol use: Not Currently    Drug use: Never    Sexual activity: Yes       FAMILY HISTORY:  Family History   Problem Relation Age of Onset    Heart disease Mother     Heart disease Father     Hypertension Sister     Thyroid disease Daughter     Diabetes Son     No Known Problems Maternal Grandmother     No Known Problems Maternal Grandfather     Dementia Paternal Grandmother     No Known Problems Paternal Grandfather     Hypertension Sister     Hypertension Sister     Hypertension Sister     Hypertension Sister     No Known  "Problems Son          ALLERGIES:  Review of patient's allergies indicates:   Allergen Reactions    Niacin Swelling    Demerol [meperidine]     Iodine and iodide containing products     Statins-hmg-coa reductase inhibitors      Abnormal behavior. Body aches    Iodinated contrast media Rash        MEDICATIONS:    Current Outpatient Medications:     amLODIPine (NORVASC) 5 MG tablet, Take 1 tablet (5 mg total) by mouth once daily., Disp: 90 tablet, Rfl: 1    clopidogreL (PLAVIX) 75 mg tablet, Take 1 tablet (75 mg total) by mouth once daily., Disp: 90 tablet, Rfl: 3    diclofenac sodium (VOLTAREN) 1 % Gel, Apply 2 g topically 4 (four) times daily as needed., Disp: , Rfl:     ezetimibe (ZETIA) 10 mg tablet, Take 1 tablet (10 mg total) by mouth once daily., Disp: 90 tablet, Rfl: 3    losartan-hydrochlorothiazide 100-12.5 mg (HYZAAR) 100-12.5 mg Tab, Take 1/2 tablet by mouth daily for HTN., Disp: 45 tablet, Rfl: 1    omega-3 fatty acids/fish oil (FISH OIL-OMEGA-3 FATTY ACIDS) 300-1,000 mg capsule, Take 2 capsules by mouth 2 (two) times a day. , Disp: , Rfl:     pantoprazole (PROTONIX) 40 MG tablet, Take 1 tablet (40 mg total) by mouth once daily., Disp: 90 tablet, Rfl: 0    tamsulosin (FLOMAX) 0.4 mg Cap, Take 1 capsule (0.4 mg total) by mouth once daily., Disp: 90 capsule, Rfl: 3    triamcinolone acetonide 0.1% (KENALOG) 0.1 % ointment, Apply topically 2 (two) times daily., Disp: 453.6 g, Rfl: 0    Current Facility-Administered Medications:     sodium hyaluronate (supartz) injection 20 mg, 20 mg, Intra-articular, 1 time in Clinic/HOD, [place]     PHYSICAL EXAM:  /78 (BP Location: Left arm, Patient Position: Sitting)   Pulse 66   Resp 18   Ht 5' 9" (1.753 m)   Wt 98 kg (216 lb)   BMI 31.90 kg/m²   Wt Readings from Last 3 Encounters:   11/09/22 98 kg (216 lb)   10/31/22 93 kg (205 lb)   10/28/22 97.5 kg (215 lb)      Body mass index is 31.9 kg/m².    Physical Exam  Vitals reviewed.   Constitutional: "       Appearance: Normal appearance.   HENT:      Head: Normocephalic and atraumatic.   Neck:      Vascular: No carotid bruit or JVD.   Cardiovascular:      Rate and Rhythm: Normal rate and regular rhythm.      Pulses: Normal pulses.           Radial pulses are 2+ on the right side and 2+ on the left side.        Dorsalis pedis pulses are 2+ on the right side and 2+ on the left side.      Heart sounds: Normal heart sounds.   Pulmonary:      Effort: Pulmonary effort is normal.      Breath sounds: Normal breath sounds.   Musculoskeletal:      Right lower leg: No edema.      Left lower leg: No edema.   Skin:     General: Skin is warm and dry.   Neurological:      Mental Status: He is alert.     LABS REVIEWED:  Lab Results   Component Value Date    WBC 8.08 11/09/2022    RBC 4.92 11/09/2022    HGB 15.6 11/09/2022    HCT 45.0 11/09/2022    MCV 91.5 11/09/2022    MCH 31.7 (H) 11/09/2022    MCHC 34.7 11/09/2022    RDW 12.1 11/09/2022     11/09/2022    MPV 9.6 11/09/2022    NRBC 0.0 11/09/2022     Lab Results   Component Value Date     (L) 11/09/2022    K 4.0 11/09/2022     11/09/2022    CO2 30 11/09/2022    BUN 10 11/09/2022     Lab Results   Component Value Date    AST 12 (L) 04/18/2022    ALT 24 10/25/2022     Lab Results   Component Value Date     11/09/2022     Lab Results   Component Value Date    CHOL 209 (H) 10/25/2022    HDL 45 10/25/2022    TRIG 159 (H) 10/25/2022    CHOLHDL 4.6 10/25/2022       CARDIAC STUDIES REVIEWED:  Results for orders placed during the hospital encounter of 10/28/22    Echo    Interpretation Summary  · The left ventricle is normal in size with concentric remodeling and low normal systolic function.  · The estimated ejection fraction is 50%.  · Left ventricular diastolic dysfunction.  · Normal right ventricular size with normal right ventricular systolic function.  · Mild mitral regurgitation.  · Mild tricuspid regurgitation.  · Mild pulmonic regurgitation.  · There  are segmental left ventricular wall motion abnormalities.  Results for orders placed during the hospital encounter of 10/28/22    Nuclear Stress Test       1. Very large anterior and septal wall perfusion defect extending to the apex in stress images which partially improves in rest images and is consistent with an area of ischemia and scar.  2. Normal left ventricular size with hypokinesis of the distal 1/2 of the interventricular septum and overall mildly impaired left ventricular systolic function, ejection fraction 43%.        ASSESSMENT:   Active Problem List with Overview Notes    Diagnosis Date Noted    Abnormal echocardiogram 11/10/2022    Chest pain 11/10/2022     remote      Abnormal cardiovascular stress test 11/10/2022    Osteoarthritis 10/28/2022    Acute medial meniscus tear of left knee 10/24/2022    Insufficiency fracture of tibia 10/24/2022    Benign prostatic hyperplasia without urinary obstruction 10/20/2022    Chest discomfort 10/20/2022    Obesity, Class I, BMI 30-34.9     Acute pain of left knee 10/17/2022    Primary osteoarthritis of both knees 09/29/2022    Primary osteoarthritis of left knee 09/08/2022    Plantar fasciitis of left foot 08/18/2022    Primary osteoarthritis of right knee 07/18/2022    History of CVA (cerebrovascular accident) 02/10/2022    Gastroesophageal reflux disease without esophagitis 02/01/2022    Statin declined 10/12/2021    Mixed hyperlipidemia     Acute pain of left shoulder 08/17/2021    Essential hypertension 03/30/2021     VISIT DIAGNOSIS:  Chest pain, unspecified type  Comments:  remote  Orders:  -     Case Request-Cath Lab: Left heart cath; Standing  -     Height and weight; Standing  -     Clip and Prep Left Groin, Right Groin, Right Radial; Standing  -     Verify informed consent; Standing  -     Vital signs; Standing  -     Cardiac Monitoring - Adult; Standing  -     Pulse Oximetry Q4H; Standing  -     Diet NPO; Standing  -     Full code; Standing  -      diphenhydrAMINE capsule 50 mg  -     diazePAM tablet 6 mg  -     Insert peripheral IV; Standing  -     0.45% NaCl infusion  -     Place in Outpatient; Standing    Pre-operative laboratory examination  -     Basic Metabolic Panel; Future; Expected date: 11/09/2022  -     CBC Auto Differential; Future; Expected date: 11/09/2022    Abnormal cardiovascular stress test  -     Basic Metabolic Panel; Future; Expected date: 11/09/2022  -     CBC Auto Differential; Future; Expected date: 11/09/2022  -     Case Request-Cath Lab: Left heart cath; Standing  -     Height and weight; Standing  -     Clip and Prep Left Groin, Right Groin, Right Radial; Standing  -     Verify informed consent; Standing  -     Vital signs; Standing  -     Cardiac Monitoring - Adult; Standing  -     Pulse Oximetry Q4H; Standing  -     Diet NPO; Standing  -     Full code; Standing  -     diphenhydrAMINE capsule 50 mg  -     diazePAM tablet 6 mg  -     Insert peripheral IV; Standing  -     0.45% NaCl infusion  -     Place in Outpatient; Standing    Abnormal echocardiogram  -     Height and weight; Standing  -     Clip and Prep Left Groin, Right Groin, Right Radial; Standing  -     Verify informed consent; Standing  -     Vital signs; Standing  -     Cardiac Monitoring - Adult; Standing  -     Pulse Oximetry Q4H; Standing  -     Diet NPO; Standing  -     Full code; Standing  -     diphenhydrAMINE capsule 50 mg  -     diazePAM tablet 6 mg  -     Insert peripheral IV; Standing  -     0.45% NaCl infusion  -     Place in Outpatient; Standing    Essential hypertension    Mixed hyperlipidemia       PLAN:  Reviewed test results with patient and wife.  Start ASA 81 mg one po qd  C with poss pci (wrist case).  Risks, benefits, and alternative were explained.    Orders Placed This Encounter   Procedures    Basic Metabolic Panel     Standing Status:   Future     Number of Occurrences:   1     Standing Expiration Date:   1/8/2024    CBC Auto Differential      Standing Status:   Future     Number of Occurrences:   1     Standing Expiration Date:   1/8/2024    Case Request-Cath Lab: Left heart cath     Standing Status:   Standing     Number of Occurrences:   1     Order Specific Question:   CPT Code:     Answer:   UT CATH PLACE/CORON ANGIO, IMG SUPER/INTERP,W LEFT HEART VENTRICULOGRAPHY [97922]     Order Specific Question:   Medical Necessity:     Answer:   Medically Non-Urgent [100]     Order Specific Question:   Is an on-site pathologist required for this procedure?     Answer:   N/A      RTC after [facility].

## 2022-11-15 ENCOUNTER — HOSPITAL ENCOUNTER (OUTPATIENT)
Facility: HOSPITAL | Age: 82
Discharge: HOME | End: 2022-11-15
Attending: INTERNAL MEDICINE | Admitting: INTERNAL MEDICINE
Payer: MEDICARE

## 2022-11-15 VITALS
HEART RATE: 62 BPM | WEIGHT: 205.13 LBS | DIASTOLIC BLOOD PRESSURE: 76 MMHG | SYSTOLIC BLOOD PRESSURE: 144 MMHG | TEMPERATURE: 98 F | RESPIRATION RATE: 16 BRPM | BODY MASS INDEX: 28.72 KG/M2 | OXYGEN SATURATION: 99 % | HEIGHT: 71 IN

## 2022-11-15 DIAGNOSIS — R07.9 CHEST PAIN, UNSPECIFIED TYPE: ICD-10-CM

## 2022-11-15 DIAGNOSIS — R93.1 ABNORMAL ECHOCARDIOGRAM: ICD-10-CM

## 2022-11-15 DIAGNOSIS — R07.89 CHEST DISCOMFORT: ICD-10-CM

## 2022-11-15 DIAGNOSIS — R94.39 ABNORMAL CARDIOVASCULAR STRESS TEST: Primary | ICD-10-CM

## 2022-11-15 LAB — CATH EF QUANTITATIVE: 55 %

## 2022-11-15 PROCEDURE — 27201423 OPTIME MED/SURG SUP & DEVICES STERILE SUPPLY: Performed by: INTERNAL MEDICINE

## 2022-11-15 PROCEDURE — 63600175 PHARM REV CODE 636 W HCPCS

## 2022-11-15 PROCEDURE — C1887 CATHETER, GUIDING: HCPCS | Performed by: INTERNAL MEDICINE

## 2022-11-15 PROCEDURE — 25500020 PHARM REV CODE 255: Performed by: INTERNAL MEDICINE

## 2022-11-15 PROCEDURE — C1769 GUIDE WIRE: HCPCS | Performed by: INTERNAL MEDICINE

## 2022-11-15 PROCEDURE — 25000003 PHARM REV CODE 250: Performed by: INTERNAL MEDICINE

## 2022-11-15 PROCEDURE — 93458 L HRT ARTERY/VENTRICLE ANGIO: CPT | Mod: 26,59,51, | Performed by: INTERNAL MEDICINE

## 2022-11-15 PROCEDURE — C1894 INTRO/SHEATH, NON-LASER: HCPCS | Performed by: INTERNAL MEDICINE

## 2022-11-15 PROCEDURE — 93458 PR CATH PLACE/CORON ANGIO, IMG SUPER/INTERP,W LEFT HEART VENTRICULOGRAPHY: ICD-10-PCS | Mod: 26,59,51, | Performed by: INTERNAL MEDICINE

## 2022-11-15 PROCEDURE — 92928 PRQ TCAT PLMT NTRAC ST 1 LES: CPT | Mod: RC,,, | Performed by: INTERNAL MEDICINE

## 2022-11-15 PROCEDURE — C1760 CLOSURE DEV, VASC: HCPCS | Performed by: INTERNAL MEDICINE

## 2022-11-15 PROCEDURE — 99152 MOD SED SAME PHYS/QHP 5/>YRS: CPT | Performed by: INTERNAL MEDICINE

## 2022-11-15 PROCEDURE — C1874 STENT, COATED/COV W/DEL SYS: HCPCS | Performed by: INTERNAL MEDICINE

## 2022-11-15 PROCEDURE — C9600 PERC DRUG-EL COR STENT SING: HCPCS | Mod: RC | Performed by: INTERNAL MEDICINE

## 2022-11-15 PROCEDURE — 99153 MOD SED SAME PHYS/QHP EA: CPT | Performed by: INTERNAL MEDICINE

## 2022-11-15 PROCEDURE — 63600175 PHARM REV CODE 636 W HCPCS: Performed by: INTERNAL MEDICINE

## 2022-11-15 PROCEDURE — 92928 PR STENT: ICD-10-PCS | Mod: RC,,, | Performed by: INTERNAL MEDICINE

## 2022-11-15 PROCEDURE — 93458 L HRT ARTERY/VENTRICLE ANGIO: CPT | Mod: 59 | Performed by: INTERNAL MEDICINE

## 2022-11-15 DEVICE — XIENCE SKYPOINT™ EVEROLIMUS ELUTING CORONARY STENT SYSTEM 3.50 MM X 38 MM / RAPID-EXCHANGE
Type: IMPLANTABLE DEVICE | Site: CORONARY | Status: FUNCTIONAL
Brand: XIENCE SKYPOINT™

## 2022-11-15 DEVICE — ANGIO-SEAL VIP VASCULAR CLOSURE DEVICE
Type: IMPLANTABLE DEVICE | Site: CORONARY | Status: FUNCTIONAL
Brand: ANGIO-SEAL

## 2022-11-15 RX ORDER — DIPHENHYDRAMINE HYDROCHLORIDE 50 MG/ML
INJECTION INTRAMUSCULAR; INTRAVENOUS
Status: DISCONTINUED | OUTPATIENT
Start: 2022-11-15 | End: 2022-11-15 | Stop reason: HOSPADM

## 2022-11-15 RX ORDER — DIAZEPAM 5 MG/1
5 TABLET ORAL
Status: DISCONTINUED | OUTPATIENT
Start: 2022-11-15 | End: 2022-11-15 | Stop reason: HOSPADM

## 2022-11-15 RX ORDER — CLOPIDOGREL 300 MG/1
TABLET, FILM COATED ORAL
Status: DISCONTINUED | OUTPATIENT
Start: 2022-11-15 | End: 2022-11-15 | Stop reason: HOSPADM

## 2022-11-15 RX ORDER — LIDOCAINE HYDROCHLORIDE 10 MG/ML
INJECTION, SOLUTION EPIDURAL; INFILTRATION; INTRACAUDAL; PERINEURAL
Status: DISCONTINUED | OUTPATIENT
Start: 2022-11-15 | End: 2022-11-15 | Stop reason: HOSPADM

## 2022-11-15 RX ORDER — FAMOTIDINE 10 MG/ML
INJECTION INTRAVENOUS
Status: DISCONTINUED | OUTPATIENT
Start: 2022-11-15 | End: 2022-11-15 | Stop reason: HOSPADM

## 2022-11-15 RX ORDER — VERAPAMIL HYDROCHLORIDE 2.5 MG/ML
INJECTION, SOLUTION INTRAVENOUS
Status: DISCONTINUED | OUTPATIENT
Start: 2022-11-15 | End: 2022-11-15 | Stop reason: HOSPADM

## 2022-11-15 RX ORDER — ONDANSETRON 4 MG/1
8 TABLET, ORALLY DISINTEGRATING ORAL EVERY 8 HOURS PRN
Status: DISCONTINUED | OUTPATIENT
Start: 2022-11-15 | End: 2022-11-15 | Stop reason: HOSPADM

## 2022-11-15 RX ORDER — ASPIRIN 81 MG/1
81 TABLET ORAL DAILY
COMMUNITY

## 2022-11-15 RX ORDER — HEPARIN SOD,PORCINE/0.9 % NACL 1000/500ML
INTRAVENOUS SOLUTION INTRAVENOUS
Status: DISCONTINUED | OUTPATIENT
Start: 2022-11-15 | End: 2022-11-15 | Stop reason: HOSPADM

## 2022-11-15 RX ORDER — DIPHENHYDRAMINE HCL 25 MG
50 CAPSULE ORAL
Status: DISCONTINUED | OUTPATIENT
Start: 2022-11-15 | End: 2022-11-15 | Stop reason: HOSPADM

## 2022-11-15 RX ORDER — MIDAZOLAM HYDROCHLORIDE 1 MG/ML
INJECTION INTRAMUSCULAR; INTRAVENOUS
Status: DISCONTINUED | OUTPATIENT
Start: 2022-11-15 | End: 2022-11-15 | Stop reason: HOSPADM

## 2022-11-15 RX ORDER — HEPARIN SODIUM 5000 [USP'U]/ML
INJECTION, SOLUTION INTRAVENOUS; SUBCUTANEOUS
Status: DISCONTINUED | OUTPATIENT
Start: 2022-11-15 | End: 2022-11-15 | Stop reason: HOSPADM

## 2022-11-15 RX ORDER — SODIUM CHLORIDE 450 MG/100ML
INJECTION, SOLUTION INTRAVENOUS CONTINUOUS
Status: DISCONTINUED | OUTPATIENT
Start: 2022-11-15 | End: 2022-11-15 | Stop reason: HOSPADM

## 2022-11-15 RX ORDER — NITROGLYCERIN 5 MG/ML
INJECTION, SOLUTION INTRAVENOUS
Status: DISCONTINUED | OUTPATIENT
Start: 2022-11-15 | End: 2022-11-15 | Stop reason: HOSPADM

## 2022-11-15 RX ORDER — FENTANYL CITRATE 50 UG/ML
INJECTION, SOLUTION INTRAMUSCULAR; INTRAVENOUS
Status: DISCONTINUED | OUTPATIENT
Start: 2022-11-15 | End: 2022-11-15 | Stop reason: HOSPADM

## 2022-11-15 RX ORDER — ACETAMINOPHEN 325 MG/1
650 TABLET ORAL EVERY 4 HOURS PRN
Status: DISCONTINUED | OUTPATIENT
Start: 2022-11-15 | End: 2022-11-15 | Stop reason: HOSPADM

## 2022-11-15 NOTE — Clinical Note
The right radial was prepped. The site was clipped. The patient was positioned supine. The patient was secured using safety straps, with ulnar pads and to an armboard.

## 2022-11-15 NOTE — Clinical Note
The catheter was inserted over the wire into the ostial  left coronary artery. An angiography was performed of the left coronary arteries. Multiple views were taken. Home

## 2022-11-15 NOTE — PLAN OF CARE
Problem: Adult Inpatient Plan of Care  Goal: Plan of Care Review  11/15/2022 0745 by Angela Schultz RN  Outcome: Ongoing, Progressing  11/15/2022 0744 by Angela Schultz RN  Outcome: Ongoing, Progressing  Goal: Patient-Specific Goal (Individualized)  11/15/2022 0745 by Angela Schultz RN  Outcome: Ongoing, Progressing  11/15/2022 0744 by Angela Schultz RN  Outcome: Ongoing, Progressing  Goal: Absence of Hospital-Acquired Illness or Injury  11/15/2022 0745 by Angela Schultz RN  Outcome: Ongoing, Progressing  11/15/2022 0744 by Angela Schultz RN  Outcome: Ongoing, Progressing  Goal: Optimal Comfort and Wellbeing  11/15/2022 0745 by Angela Schultz RN  Outcome: Ongoing, Progressing  11/15/2022 0744 by Angela Schultz RN  Outcome: Ongoing, Progressing  Goal: Readiness for Transition of Care  11/15/2022 0745 by Angela Schultz RN  Outcome: Ongoing, Progressing  11/15/2022 0744 by Anegla Schultz RN  Outcome: Ongoing, Progressing     Problem: Arrhythmia/Dysrhythmia (Cardiac Catheterization)  Goal: Stable Heart Rate and Rhythm  11/15/2022 0745 by Angela Schultz RN  Outcome: Ongoing, Progressing  11/15/2022 0744 by Angela Schultz RN  Outcome: Ongoing, Progressing     Problem: Bleeding (Cardiac Catheterization)  Goal: Absence of Bleeding  11/15/2022 0745 by Angela Schultz RN  Outcome: Ongoing, Progressing  11/15/2022 0744 by Angela Schultz RN  Outcome: Ongoing, Progressing     Problem: Contrast-Induced Injury Risk (Cardiac Catheterization)  Goal: Absence of Contrast-Induced Injury  11/15/2022 0745 by Angela Schultz RN  Outcome: Ongoing, Progressing  11/15/2022 0744 by Angela Schultz RN  Outcome: Ongoing, Progressing     Problem: Embolism (Cardiac Catheterization)  Goal: Absence of Embolism Signs and Symptoms  11/15/2022 0745 by Angela Schultz RN  Outcome: Ongoing, Progressing  11/15/2022 0744 by Angela Schultz RN  Outcome: Ongoing, Progressing     Problem: Ongoing Anesthesia/Sedation Effects (Cardiac Catheterization)  Goal: Anesthesia/Sedation  Recovery  11/15/2022 0745 by Angela Schultz RN  Outcome: Ongoing, Progressing  11/15/2022 0744 by Angela Schultz RN  Outcome: Ongoing, Progressing     Problem: Pain (Cardiac Catheterization)  Goal: Acceptable Pain Control  11/15/2022 0745 by Angela Schultz RN  Outcome: Ongoing, Progressing  11/15/2022 0744 by Angela Schultz RN  Outcome: Ongoing, Progressing     Problem: Vascular Access Protection (Cardiac Catheterization)  Goal: Absence of Vascular Access Complication  11/15/2022 0745 by Angela Schultz RN  Outcome: Ongoing, Progressing  11/15/2022 0744 by Angela Schultz RN  Outcome: Ongoing, Progressing   Oriented to room/unit - reviewed POC

## 2022-11-15 NOTE — PLAN OF CARE
Problem: Adult Inpatient Plan of Care  Goal: Plan of Care Review  11/15/2022 1612 by Angela Schultz RN  Outcome: Met  11/15/2022 0745 by Angela Schultz RN  Outcome: Ongoing, Progressing  11/15/2022 0744 by Angela Schultz RN  Outcome: Ongoing, Progressing  Goal: Patient-Specific Goal (Individualized)  11/15/2022 1612 by Angela Schultz RN  Outcome: Met  11/15/2022 0745 by Angela Schultz RN  Outcome: Ongoing, Progressing  11/15/2022 0744 by Angela Schultz RN  Outcome: Ongoing, Progressing  Goal: Absence of Hospital-Acquired Illness or Injury  11/15/2022 1612 by Angela Schultz RN  Outcome: Met  11/15/2022 0745 by Angela Schultz RN  Outcome: Ongoing, Progressing  11/15/2022 0744 by Angela Schultz RN  Outcome: Ongoing, Progressing  Goal: Optimal Comfort and Wellbeing  11/15/2022 1612 by Angela Schultz RN  Outcome: Met  11/15/2022 0745 by Angela Schultz RN  Outcome: Ongoing, Progressing  11/15/2022 0744 by Angela Schultz RN  Outcome: Ongoing, Progressing  Goal: Readiness for Transition of Care  11/15/2022 1612 by Angela Schultz RN  Outcome: Met  11/15/2022 0745 by Angela Schultz RN  Outcome: Ongoing, Progressing  11/15/2022 0744 by Angela Schultz RN  Outcome: Ongoing, Progressing     Problem: Arrhythmia/Dysrhythmia (Cardiac Catheterization)  Goal: Stable Heart Rate and Rhythm  11/15/2022 1612 by Angela Schultz RN  Outcome: Met  11/15/2022 0745 by Angela Schultz RN  Outcome: Ongoing, Progressing  11/15/2022 0744 by Angela Schultz RN  Outcome: Ongoing, Progressing     Problem: Bleeding (Cardiac Catheterization)  Goal: Absence of Bleeding  11/15/2022 1612 by Angela Schultz RN  Outcome: Met  11/15/2022 0745 by Angela Schultz RN  Outcome: Ongoing, Progressing  11/15/2022 0744 by Angela Schultz RN  Outcome: Ongoing, Progressing     Problem: Contrast-Induced Injury Risk (Cardiac Catheterization)  Goal: Absence of Contrast-Induced Injury  11/15/2022 1612 by Angela Schultz RN  Outcome: Met  11/15/2022 0745 by Angela Ross, RN  Outcome: Ongoing,  Progressing  11/15/2022 0744 by Angela Schultz RN  Outcome: Ongoing, Progressing     Problem: Embolism (Cardiac Catheterization)  Goal: Absence of Embolism Signs and Symptoms  11/15/2022 1612 by Angela Schultz RN  Outcome: Met  11/15/2022 0745 by Angela Schultz RN  Outcome: Ongoing, Progressing  11/15/2022 0744 by Angela Schultz RN  Outcome: Ongoing, Progressing     Problem: Ongoing Anesthesia/Sedation Effects (Cardiac Catheterization)  Goal: Anesthesia/Sedation Recovery  11/15/2022 1612 by Angela Schultz RN  Outcome: Met  11/15/2022 0745 by Angela Schultz RN  Outcome: Ongoing, Progressing  11/15/2022 0744 by Angela Schultz RN  Outcome: Ongoing, Progressing     Problem: Pain (Cardiac Catheterization)  Goal: Acceptable Pain Control  11/15/2022 1612 by Angela Schultz RN  Outcome: Met  11/15/2022 0745 by Angela Schultz RN  Outcome: Ongoing, Progressing  11/15/2022 0744 by Angela Schultz RN  Outcome: Ongoing, Progressing     Problem: Vascular Access Protection (Cardiac Catheterization)  Goal: Absence of Vascular Access Complication  11/15/2022 1612 by Angela Schultz RN  Outcome: Met  11/15/2022 0745 by Angela Schultz RN  Outcome: Ongoing, Progressing  11/15/2022 0744 by Angela Schultz RN  Outcome: Ongoing, Progressing     Problem: Fall Injury Risk  Goal: Absence of Fall and Fall-Related Injury  Outcome: Met

## 2022-11-15 NOTE — Clinical Note
The catheter was inserted over the wire into the. Hemodynamics were performed.  and Pullback was recorded.

## 2022-11-15 NOTE — PLAN OF CARE
Problem: Adult Inpatient Plan of Care  Goal: Plan of Care Review  Outcome: Ongoing, Progressing  Goal: Patient-Specific Goal (Individualized)  Outcome: Ongoing, Progressing  Goal: Absence of Hospital-Acquired Illness or Injury  Outcome: Ongoing, Progressing  Goal: Optimal Comfort and Wellbeing  Outcome: Ongoing, Progressing  Goal: Readiness for Transition of Care  Outcome: Ongoing, Progressing     Problem: Arrhythmia/Dysrhythmia (Cardiac Catheterization)  Goal: Stable Heart Rate and Rhythm  Outcome: Ongoing, Progressing     Problem: Bleeding (Cardiac Catheterization)  Goal: Absence of Bleeding  Outcome: Ongoing, Progressing     Problem: Contrast-Induced Injury Risk (Cardiac Catheterization)  Goal: Absence of Contrast-Induced Injury  Outcome: Ongoing, Progressing     Problem: Embolism (Cardiac Catheterization)  Goal: Absence of Embolism Signs and Symptoms  Outcome: Ongoing, Progressing     Problem: Ongoing Anesthesia/Sedation Effects (Cardiac Catheterization)  Goal: Anesthesia/Sedation Recovery  Outcome: Ongoing, Progressing     Problem: Pain (Cardiac Catheterization)  Goal: Acceptable Pain Control  Outcome: Ongoing, Progressing     Problem: Vascular Access Protection (Cardiac Catheterization)  Goal: Absence of Vascular Access Complication  Outcome: Ongoing, Progressing     Problem: Adult Inpatient Plan of Care  Goal: Plan of Care Review  Outcome: Ongoing, Progressing  Goal: Patient-Specific Goal (Individualized)  Outcome: Ongoing, Progressing  Goal: Absence of Hospital-Acquired Illness or Injury  Outcome: Ongoing, Progressing  Goal: Optimal Comfort and Wellbeing  Outcome: Ongoing, Progressing  Goal: Readiness for Transition of Care  Outcome: Ongoing, Progressing     Problem: Arrhythmia/Dysrhythmia (Cardiac Catheterization)  Goal: Stable Heart Rate and Rhythm  Outcome: Ongoing, Progressing     Problem: Bleeding (Cardiac Catheterization)  Goal: Absence of Bleeding  Outcome: Ongoing, Progressing     Problem:  Contrast-Induced Injury Risk (Cardiac Catheterization)  Goal: Absence of Contrast-Induced Injury  Outcome: Ongoing, Progressing     Problem: Embolism (Cardiac Catheterization)  Goal: Absence of Embolism Signs and Symptoms  Outcome: Ongoing, Progressing     Problem: Ongoing Anesthesia/Sedation Effects (Cardiac Catheterization)  Goal: Anesthesia/Sedation Recovery  Outcome: Ongoing, Progressing     Problem: Pain (Cardiac Catheterization)  Goal: Acceptable Pain Control  Outcome: Ongoing, Progressing     Problem: Vascular Access Protection (Cardiac Catheterization)  Goal: Absence of Vascular Access Complication  Outcome: Ongoing, Progressing

## 2022-11-15 NOTE — Clinical Note
135 ml of contrast were injected throughout the case. 165 mL of contrast was the total wasted during the case. 300 mL was the total amount used during the case.

## 2022-11-15 NOTE — Clinical Note
The catheter insertion attempt was made into the ostial  left coronary artery. The catheter was unable to engage the area..

## 2022-11-15 NOTE — Clinical Note
The catheter was repositioned into the ostial  left coronary artery. An angiography was performed of the left coronary arteries.

## 2022-11-15 NOTE — Clinical Note
The radial band was applied to the right radial artery. 15 cc's of air were inserted into the closure device.

## 2022-11-15 NOTE — NURSING
Discharge instructions reviewed with patient and spouse and copy given to patient. Patient and spouse  voiced understanding of appts, meds, dressing changes and the following:                                                                                          CARE OF YOUR PUNCTURE SITE  *You or someone else, if you are unable, must inspect the site daily.  *DO NOT sit in a bathtub or pool of water for 5 days or until wound has healed.  *You may shower 24 hours after the procedure. Always use a clean washcloth to care for your incision and the area around it and a second washcloth for your body. Remove the bandaid before showering. Gently clean site using soap and water while standing in the shower. Dry thoroughly.  *After your shower, apply an antibacterial ointment to the site and cover with a bandaid the first day after your cath.  If you choose not to shower this day, you will still need to clean and redress your cath site.  *If you start bleeding at the site lay down while either you or someone else holds pressure over the site for 10 minutes without letting up. Seek help immediately if it does not stop bleeding.                                                                                                          ACTIVITY  *Fatigue is common for 1-2 days.  *You may resume normal activity in 2-3 days, letting pain be your guide.  *Limit lifting over 10 pounds for one week or until wound heals.   *Over the next few days, if you have to cough, laugh, or sneeze, hold pressure on the site with your hand while doing so.                                                                                          NORMAL OBSERVATIONS  *Soreness or tenderness that may last one week.  *Mild oozing from the site.  *Possible bruising that could last 2 weeks.  *Formation of a small lump (dime to quarter size) which may last up to 6 weeks.        CALL THE DOCTOR IMMEDIATELY OR GO TO THE EMERGENCY DEPARTMENT IF YOU  EXPERIENCE ANY OF THE FOLLOWING  *Significant bleeding that does not stop after 10 minutes of applying firm pressure.  *Increased swelling at access site or extremity.  *Unusual pain at access site:extremity or back pain.  *Signs of infection:redness, warmth to touch, drainage other than a little blood or pink tinged drainage on the bandaid, poorly healing puncture site, fever, or chills.

## 2022-11-15 NOTE — DISCHARGE SUMMARY
Mr. Thomas was admitted for elective left heart catheterization given his history of chest pain and abnormal stress testing.  He underwent uncomplicated left heart catheterization today demonstrating severe three-vessel coronary artery disease with  of his mid LAD and mid left circumflex.  Both distal vessels receive collateral circulation from the right coronary artery.  LV systolic function is overall normal, EF 55% with anterior and apical hypokinesis.  The patient underwent primary stent placement within the proximal RCA using a 3.5 by 38 mm Xience Yoanna drug-eluting stent deployed to 20 atmospheres.  The patient had an uncomplicated post catheterization course his leg having been sealed with Angio-Seal.  I feel he is now reached maximum hospital benefit and will be ready for discharge today at 4:30 p.m..    Impression:    1. Severe three-vessel coronary artery disease.      2. Normal left ventricular size with anterior wall hypokinesis and overall normal LV systolic function, ejection fraction 55%.      3. No significant mitral regurgitation.      4. Successful primary stent of the proximal RCA using a 3.5 by 38 mm Xience drug-eluting stent deployed to 20 atmospheres.      Plan:    1. Load with Plavix.  Continue Plavix and aspirin daily for 6 months.      2. Risk factor modification.      3. Follow-up with cardiology in 4-6 weeks, before if needed.

## 2022-11-16 ENCOUNTER — TELEPHONE (OUTPATIENT)
Dept: ORTHOPEDICS | Facility: HOSPITAL | Age: 82
End: 2022-11-16
Payer: MEDICARE

## 2022-11-16 NOTE — TELEPHONE ENCOUNTER
Patient states is doing well, cath site clean dry and intact, no signs of infection noted. No complaints or concern voiced.

## 2022-11-18 ENCOUNTER — TELEPHONE (OUTPATIENT)
Dept: ORTHOPEDICS | Facility: HOSPITAL | Age: 82
End: 2022-11-18
Payer: MEDICARE

## 2022-11-28 DIAGNOSIS — M23.92 INTERNAL DERANGEMENT OF LEFT KNEE: Primary | ICD-10-CM

## 2022-11-28 RX ORDER — TRAMADOL HYDROCHLORIDE 50 MG/1
50 TABLET ORAL EVERY 6 HOURS PRN
Qty: 30 EACH | Refills: 0 | Status: SHIPPED | OUTPATIENT
Start: 2022-11-28 | End: 2024-02-08

## 2022-11-29 ENCOUNTER — TELEPHONE (OUTPATIENT)
Dept: FAMILY MEDICINE | Facility: CLINIC | Age: 82
End: 2022-11-29
Payer: MEDICARE

## 2022-11-29 DIAGNOSIS — N40.0 BENIGN PROSTATIC HYPERPLASIA WITHOUT URINARY OBSTRUCTION: Primary | Chronic | ICD-10-CM

## 2022-11-29 RX ORDER — TAMSULOSIN HYDROCHLORIDE 0.4 MG/1
1 CAPSULE ORAL DAILY
Qty: 90 CAPSULE | Refills: 3 | Status: SHIPPED | OUTPATIENT
Start: 2022-11-29 | End: 2023-12-15 | Stop reason: SDUPTHER

## 2022-11-29 NOTE — TELEPHONE ENCOUNTER
----- Message from Janine Retana sent at 11/29/2022  9:33 AM CST -----  Patient needs a refill on flomax called into [facility] pharmacy .  Please call patient at [phone] if you have any questions. Thanks!

## 2022-12-09 NOTE — PROGRESS NOTES
[facility]     PATIENT NAME: Cj Thomas   : 1940    AGE: 82 y.o. DATE: 2022   MRN: [id number]        Reason for Visit / Chief Complaint: Medicare AWV (Subsequent Medicare AWV visit .)        Cj Thomas presents for a Subsequent Medicare AWV today.    Review of Systems   Constitutional: Negative.    Respiratory: Negative.     Cardiovascular: Negative.    Neurological: Negative.       The following components were reviewed and updated:      Medical/Social/Family History:  Past Medical History:   Diagnosis Date    Abnormal echocardiogram 11/10/2022    Anticoagulant long-term use     Benign prostate hyperplasia     Coronary artery disease involving native coronary artery of native heart with angina pectoris     COVID-19 02/10/2022    GERD (gastroesophageal reflux disease)     Hyperlipidemia     Hypertension     Osteoarthritis     Stroke         Family History   Problem Relation Age of Onset    Heart disease Mother     Heart disease Father     Hypertension Sister     Thyroid disease Daughter     Diabetes Son     No Known Problems Maternal Grandmother     No Known Problems Maternal Grandfather     Dementia Paternal Grandmother     No Known Problems Paternal Grandfather     Hypertension Sister     Hypertension Sister     Hypertension Sister     Hypertension Sister     No Known Problems Son         Past Surgical History:   Procedure Laterality Date    LEFT HEART CATHETERIZATION Right 11/15/2022    Procedure: Left heart cath;  Surgeon: Roby Zapata MD;  Location: [facility] CATH LAB;  Service: Cardiology;  Laterality: Right;  right wrist    TONSILLECTOMY      TOTAL KNEE ARTHROPLASTY Right        Social History     Tobacco Use   Smoking Status Never   Smokeless Tobacco Never       Social History     Substance and Sexual Activity   Alcohol Use Not Currently         Allergies and Current Medications     Review of patient's allergies indicates:   Allergen Reactions     "Niacin Swelling     "Not sure"    Demerol [meperidine] Other (See Comments)     "Forgets everything"    Iodine and iodide containing products     Statins-hmg-coa reductase inhibitors      Abnormal behavior. Body aches    Iodinated contrast media Rash and Other (See Comments)     Passes out after       Current Outpatient Medications:     amLODIPine (NORVASC) 5 MG tablet, Take 1 tablet (5 mg total) by mouth once daily., Disp: 90 tablet, Rfl: 1    clopidogreL (PLAVIX) 75 mg tablet, Take 1 tablet (75 mg total) by mouth once daily., Disp: 90 tablet, Rfl: 3    diclofenac sodium (VOLTAREN) 1 % Gel, Apply 2 g topically 4 (four) times daily as needed., Disp: , Rfl:     ezetimibe (ZETIA) 10 mg tablet, Take 1 tablet (10 mg total) by mouth once daily., Disp: 90 tablet, Rfl: 3    losartan-hydrochlorothiazide 100-12.5 mg (HYZAAR) 100-12.5 mg Tab, Take 1/2 tablet by mouth daily for HTN., Disp: 45 tablet, Rfl: 1    omega-3 fatty acids/fish oil (FISH OIL-OMEGA-3 FATTY ACIDS) 300-1,000 mg capsule, Take 2 capsules by mouth 2 (two) times a day. , Disp: , Rfl:     pantoprazole (PROTONIX) 40 MG tablet, Take 1 tablet (40 mg total) by mouth once daily., Disp: 90 tablet, Rfl: 0    tamsulosin (FLOMAX) 0.4 mg Cap, Take 1 capsule (0.4 mg total) by mouth once daily., Disp: 90 capsule, Rfl: 3    triamcinolone acetonide 0.1% (KENALOG) 0.1 % ointment, Apply topically 2 (two) times daily., Disp: 453.6 g, Rfl: 0    aspirin (ECOTRIN) 81 MG EC tablet, Take 81 mg by mouth once daily., Disp: , Rfl:     traMADoL (ULTRAM) 50 mg tablet, Take 1 tablet (50 mg total) by mouth every 6 (six) hours as needed for Pain., Disp: 30 each, Rfl: 0    Current Facility-Administered Medications:     sodium hyaluronate (supartz) injection 20 mg, 20 mg, Intra-articular, 1 time in Clinic/HOD, [facility] [place]      Health Risk Assessment   Fall Risk:  not at risk   Obesity: BMI Body mass index is 30.86 kg/m².   Advance Directive: no but information given   Depression: " PHQ9- 1   HTN: DASH diet, exercise, weight management, med compliance, home BP monitoring, and follow-up discussed.   T2DM: no  STI: not at risk   Statin Use: no      Health Maintenance   Last eye exam: saw Dr. Garcia 5/3/22   Last CV screen with lipids: 10/25/22   Diabetes screening with fasting glucose or A1c: 11/9/22   Colonoscopy: declined advanced age   Flu Vaccine: declined   Pneumonia vaccines: Pneu 13-4/9/18, Pneu 23-4/1/18   COVID vaccine: declined   Hep B vaccine: na   DEXA: advanced age   Last PSA screen: advanced age   AAA screening: na   HIV Screening: not at risk  Hepatitis C Screen: na  Low Dose CT Scan: na    Health Maintenance Topics with due status: Not Due       Topic Last Completion Date    TETANUS VACCINE 09/07/2017    Eye Exam 05/03/2022    Lipid Panel 10/25/2022     There are no preventive care reminders to display for this patient.      Lab results available in Epic or see dates from [facility] above:   Lab Results   Component Value Date    CHOL 209 (H) 10/25/2022    CHOL 240 (H) 04/18/2022     Lab Results   Component Value Date    HDL 45 10/25/2022    HDL 43 04/18/2022     Lab Results   Component Value Date    LDLCALC 132 10/25/2022    LDLCALC 160 04/18/2022     Lab Results   Component Value Date    TRIG 159 (H) 10/25/2022    TRIG 187 (H) 04/18/2022     Lab Results   Component Value Date    CHOLHDL 4.6 10/25/2022    CHOLHDL 5.6 04/18/2022       No results found for: LABA1C, HGBA1C    Sodium   Date Value Ref Range Status   11/09/2022 134 (L) 136 - 145 mmol/L Final     Potassium   Date Value Ref Range Status   11/09/2022 4.0 3.5 - 5.1 mmol/L Final     Chloride   Date Value Ref Range Status   11/09/2022 100 98 - 107 mmol/L Final     CO2   Date Value Ref Range Status   11/09/2022 30 21 - 32 mmol/L Final     Glucose   Date Value Ref Range Status   11/09/2022 102 74 - 106 mg/dL Final     BUN   Date Value Ref Range Status   11/09/2022 10 7 - 18 mg/dL Final     Creatinine   Date Value Ref Range Status  "  11/09/2022 0.98 0.70 - 1.30 mg/dL Final     Calcium   Date Value Ref Range Status   11/09/2022 9.0 8.5 - 10.1 mg/dL Final     Total Protein   Date Value Ref Range Status   04/18/2022 7.0 6.4 - 8.2 g/dL Final     Albumin   Date Value Ref Range Status   04/18/2022 3.7 3.5 - 5.0 g/dL Final     Bilirubin, Total   Date Value Ref Range Status   04/18/2022 0.6 0.0 - 1.2 mg/dL Final     Alk Phos   Date Value Ref Range Status   04/18/2022 83 45 - 115 U/L Final     AST   Date Value Ref Range Status   04/18/2022 12 (L) 15 - 37 U/L Final     ALT   Date Value Ref Range Status   10/25/2022 24 16 - 61 U/L Final     Anion Gap   Date Value Ref Range Status   11/09/2022 8 7 - 16 mmol/L Final     eGFR   Date Value Ref Range Status   04/18/2022 86 >=60 mL/min/1.73m² Final         Incontinence  Bowel: no  Bladder: no      Care Team  Hardik FNP - PCP                            Dr. Zapata - Cardiologist                            Dr. Garcia - Ophthalmologist          **See Completed Assessments for Annual Wellness visit within the encounter summary    The following assessments were completed & reviewed:  Depression Screening  Cognitive function Screening  Timed Get Up Test  Whisper Test  Vision Screen  Health Risk Assessment  Checklist of ADLs and IADLs        Objective  Vitals:    12/14/22 0935   BP: 130/80   Pulse: 65   Resp: 16   Temp: 98.6 °F (37 °C)   TempSrc: Oral   SpO2: 98%   Weight: 94.8 kg (209 lb)   Height: 5' 9" (1.753 m)   PainSc:   5      Body mass index is 30.86 kg/m².  Ideal body weight: 70.7 kg (155 lb 13.8 oz)       Physical Exam  Vitals and nursing note reviewed.   Constitutional:       General: He is not in acute distress.     Appearance: Normal appearance.   HENT:      Head: Normocephalic.   Eyes:      Conjunctiva/sclera: Conjunctivae normal.   Neck:      Thyroid: No thyromegaly or thyroid tenderness.      Trachea: Trachea normal.   Cardiovascular:      Rate and Rhythm: Normal rate and regular rhythm.      " Pulses: Normal pulses.      Heart sounds: Normal heart sounds.   Pulmonary:      Effort: Pulmonary effort is normal. No respiratory distress.      Breath sounds: Normal breath sounds.   Musculoskeletal:      Cervical back: Neck supple.      Right lower leg: No edema.      Left lower leg: No edema.   Lymphadenopathy:      Cervical: No cervical adenopathy.   Skin:     General: Skin is warm and dry.   Neurological:      Mental Status: He is alert and oriented to person, place, and time.   Psychiatric:         Mood and Affect: Mood normal.         Behavior: Behavior normal.         Assessment:     1. Encounter for subsequent annual wellness visit (AWV) in Medicare patient    2. Essential hypertension    3. Mixed hyperlipidemia    4. Primary osteoarthritis of right knee    5. BMI 30.0-30.9,adult    6. Coronary artery disease involving native coronary artery of native heart with angina pectoris         Plan:    Discussed and provided with a screening schedule and personal prevention plan in accordance with USPSTF age appropriate recommendations and Medicare screening guidelines.   Education, counseling, and referrals were provided as needed.  After Visit Summary printed and given to patient which includes written education and a list of any referrals if indicated.     Education including diet, exercise, falls, preventive health care for older adults and advanced directives discussed with patient and patient verbalized understanding.      F/u plan for yearly AWV.    Signature: Samantha JOYNER

## 2022-12-14 ENCOUNTER — OFFICE VISIT (OUTPATIENT)
Dept: FAMILY MEDICINE | Facility: CLINIC | Age: 82
End: 2022-12-14
Payer: MEDICARE

## 2022-12-14 VITALS
SYSTOLIC BLOOD PRESSURE: 130 MMHG | BODY MASS INDEX: 30.96 KG/M2 | HEIGHT: 69 IN | HEART RATE: 65 BPM | DIASTOLIC BLOOD PRESSURE: 80 MMHG | WEIGHT: 209 LBS | RESPIRATION RATE: 16 BRPM | OXYGEN SATURATION: 98 % | TEMPERATURE: 99 F

## 2022-12-14 DIAGNOSIS — M17.11 PRIMARY OSTEOARTHRITIS OF RIGHT KNEE: ICD-10-CM

## 2022-12-14 DIAGNOSIS — Z00.00 ENCOUNTER FOR SUBSEQUENT ANNUAL WELLNESS VISIT (AWV) IN MEDICARE PATIENT: Primary | ICD-10-CM

## 2022-12-14 DIAGNOSIS — I25.119 CORONARY ARTERY DISEASE INVOLVING NATIVE CORONARY ARTERY OF NATIVE HEART WITH ANGINA PECTORIS: ICD-10-CM

## 2022-12-14 DIAGNOSIS — I10 ESSENTIAL HYPERTENSION: Chronic | ICD-10-CM

## 2022-12-14 DIAGNOSIS — E78.2 MIXED HYPERLIPIDEMIA: Chronic | ICD-10-CM

## 2022-12-14 PROBLEM — R93.1 ABNORMAL ECHOCARDIOGRAM: Status: RESOLVED | Noted: 2022-11-10 | Resolved: 2022-12-14

## 2022-12-14 PROBLEM — R07.89 CHEST DISCOMFORT: Status: RESOLVED | Noted: 2022-10-20 | Resolved: 2022-12-14

## 2022-12-14 PROBLEM — R07.9 CHEST PAIN: Status: RESOLVED | Noted: 2022-11-10 | Resolved: 2022-12-14

## 2022-12-14 PROCEDURE — G0439 PPPS, SUBSEQ VISIT: HCPCS | Mod: ,,, | Performed by: NURSE PRACTITIONER

## 2022-12-14 PROCEDURE — G0439 PR MEDICARE ANNUAL WELLNESS SUBSEQUENT VISIT: ICD-10-PCS | Mod: ,,, | Performed by: NURSE PRACTITIONER

## 2022-12-14 RX ORDER — TRIAMCINOLONE ACETONIDE 1 MG/G
OINTMENT TOPICAL 2 TIMES DAILY
Qty: 454 G | Refills: 0 | Status: SHIPPED | OUTPATIENT
Start: 2022-12-14

## 2022-12-14 NOTE — PATIENT INSTRUCTIONS
Counseling and Referral of Other Preventative  (Italic type indicates deductible and co-insurance are waived)    Patient Name: Cj hTomas  Today's Date: 12/14/2022    Health Maintenance         Date Due Completion Date    Influenza Vaccine (1) 06/30/2023 (Originally 9/1/2022) 11/1/2013    Shingles Vaccine (1 of 2) 10/20/2032 (Originally 10/1/1991) ---    Eye Exam 05/03/2023 5/3/2022 (Done)    Override on 5/3/2022: Done (Dr. Azra Garcia)    Override on 9/7/2021: Done (Dr. Garcia)    TETANUS VACCINE 09/07/2027 9/7/2017    Lipid Panel 10/25/2027 10/25/2022    Override on 2/3/2021: Done ( - will not take cholesterol med)          No orders of the defined types were placed in this encounter.       I was able to speak to patient on 3/16/2020 at approximately 7:30pm.  I notified patient that 3/17/2020 scheduled surgery is cancelled and that she would be called around 4/1/2020 to reschedule.  Patient verbalized understanding.

## 2022-12-27 ENCOUNTER — OFFICE VISIT (OUTPATIENT)
Dept: CARDIOLOGY | Facility: CLINIC | Age: 82
End: 2022-12-27
Payer: MEDICARE

## 2022-12-27 VITALS
DIASTOLIC BLOOD PRESSURE: 72 MMHG | OXYGEN SATURATION: 98 % | SYSTOLIC BLOOD PRESSURE: 124 MMHG | HEART RATE: 66 BPM | WEIGHT: 214.38 LBS | BODY MASS INDEX: 31.75 KG/M2 | HEIGHT: 69 IN

## 2022-12-27 DIAGNOSIS — I25.10 CORONARY ARTERY DISEASE DUE TO LIPID RICH PLAQUE: Primary | ICD-10-CM

## 2022-12-27 DIAGNOSIS — I25.119 CORONARY ARTERY DISEASE INVOLVING NATIVE CORONARY ARTERY OF NATIVE HEART WITH ANGINA PECTORIS: ICD-10-CM

## 2022-12-27 DIAGNOSIS — I20.89 STABLE ANGINA: ICD-10-CM

## 2022-12-27 DIAGNOSIS — I25.83 CORONARY ARTERY DISEASE DUE TO LIPID RICH PLAQUE: Primary | ICD-10-CM

## 2022-12-27 PROCEDURE — 99214 OFFICE O/P EST MOD 30 MIN: CPT | Mod: PBBFAC | Performed by: NURSE PRACTITIONER

## 2022-12-27 PROCEDURE — 99214 OFFICE O/P EST MOD 30 MIN: CPT | Mod: S$PBB,,, | Performed by: NURSE PRACTITIONER

## 2022-12-27 PROCEDURE — 93010 ELECTROCARDIOGRAM REPORT: CPT | Mod: S$PBB,,, | Performed by: INTERNAL MEDICINE

## 2022-12-27 PROCEDURE — 99214 PR OFFICE/OUTPT VISIT, EST, LEVL IV, 30-39 MIN: ICD-10-PCS | Mod: S$PBB,,, | Performed by: NURSE PRACTITIONER

## 2022-12-27 PROCEDURE — 93010 EKG 12-LEAD: ICD-10-PCS | Mod: S$PBB,,, | Performed by: INTERNAL MEDICINE

## 2022-12-27 PROCEDURE — 93005 ELECTROCARDIOGRAM TRACING: CPT | Mod: PBBFAC | Performed by: INTERNAL MEDICINE

## 2022-12-27 RX ORDER — METOPROLOL SUCCINATE 25 MG/1
25 TABLET, EXTENDED RELEASE ORAL DAILY
Qty: 30 TABLET | Refills: 11 | Status: SHIPPED | OUTPATIENT
Start: 2022-12-27 | End: 2023-01-31 | Stop reason: SDUPTHER

## 2022-12-27 RX ORDER — NITROGLYCERIN 0.4 MG/1
0.4 TABLET SUBLINGUAL EVERY 5 MIN PRN
Qty: 25 TABLET | Refills: 3 | Status: SHIPPED | OUTPATIENT
Start: 2022-12-27 | End: 2024-02-08

## 2022-12-27 NOTE — ASSESSMENT & PLAN NOTE
LHC with  of the LAD/LCX and collaterals from the RCA. S/p LENA to the RCA  Chest discomfort x 2 since his LENA both while walking for exercise (approx 75 feet) resolved with rest  EKG  Prn nitrates  Add low dose Toprol XL  Continue Norvasc

## 2022-12-27 NOTE — PROGRESS NOTES
PCP: [facility] Friedman    Referring Provider:     Subjective:   Cj Thomas is a 82 y.o. male with hx of HLD, HTN and CAD who presents for follow up s/p C with  the mid LAD and prox to mid Lcx  with collateral circulation form the r-PD and 2nd r-pl and is s/p LENA to the prox and mid RCA. He states that he still has the burning chest pain as prior to his procedure. He has had two episodes both while walking for exercise and began after walking about 75 feet. This was resolved with rest. He has had no rest pain or pain with minimal activity.         Fhx:Mother  age 90 + HD and CABG  Father  age 73 of MI  Sisters x 5 without known health issues  Shx: Quit tobacco 30 years ago; - ETOH and recreational drugs    EK2022(reviewed with Dr. Zapata) RSR with PAC's HR 65 bpm; anterolateral TWA  Results for orders placed or performed in visit on 10/24/22   EKG 12-lead    Collection Time: 10/24/22 12:39 PM    Narrative    Test Reason : I10,R07.89,    Vent. Rate : 064 BPM     Atrial Rate : 064 BPM     P-R Int : 198 ms          QRS Dur : 080 ms      QT Int : 384 ms       P-R-T Axes : 053 051 029 degrees     QTc Int : 396 ms    Sinus rhythm with Premature supraventricular complexes  Nonspecific T wave abnormality  Abnormal ECG  No previous ECGs available  Confirmed by Roby Zapata MD ([id number]) on 10/25/2022 5:15:25 PM    Referred By: DIANE RODRIGUES           Confirmed By:Roby Zapata MD      ECHO Results for orders placed during the hospital encounter of 10/28/22    Echo    Interpretation Summary  · The left ventricle is normal in size with concentric remodeling and low normal systolic function.  · The estimated ejection fraction is 50%.  · Left ventricular diastolic dysfunction.  · Normal right ventricular size with normal right ventricular systolic function.  · Mild mitral regurgitation.  · Mild tricuspid regurgitation.  · Mild pulmonic regurgitation.  · There are segmental left  ventricular wall motion abnormalities.   [facility] Results for orders placed during the hospital encounter of 11/15/22    Cardiac catheterization    Conclusion    The RPDA lesion was 50% stenosed.    The Mid LAD lesion was 100% stenosed.    The Prox RCA lesion was 70% stenosed with 0% stenosis post-intervention.    The Mid RCA lesion was 90% stenosed with 0% stenosis post-intervention.    The Ramus lesion was 75% stenosed.    The Prox Cx to Mid Cx lesion was 100% stenosed.    The ejection fraction was calculated to be 55%.    The left ventricular systolic function was normal.    The left ventricular end diastolic pressure was elevated.    The pre-procedure left ventricular end diastolic pressure was 23.    A  at 20 MAUREEN for 14 sec.    The estimated blood loss was none.    There was three vessel coronary artery disease.    There was no mitral valve regurgitation.    The procedure log was documented by Documenter: Claude Jansen RN and verified by Roby Zapata MD.    Date: 11/15/2022  Time: 10:49 AM       Lab Results   Component Value Date     (L) 11/09/2022    K 4.0 11/09/2022     11/09/2022    CO2 30 11/09/2022    BUN 10 11/09/2022    CREATININE 0.98 11/09/2022    CALCIUM 9.0 11/09/2022    ANIONGAP 8 11/09/2022    EGFRNONAA 86 04/18/2022       Lab Results   Component Value Date    CHOL 209 (H) 10/25/2022    CHOL 240 (H) 04/18/2022     Lab Results   Component Value Date    HDL 45 10/25/2022    HDL 43 04/18/2022     Lab Results   Component Value Date    LDLCALC 132 10/25/2022    LDLCALC 160 04/18/2022     Lab Results   Component Value Date    TRIG 159 (H) 10/25/2022    TRIG 187 (H) 04/18/2022     Lab Results   Component Value Date    CHOLHDL 4.6 10/25/2022    CHOLHDL 5.6 04/18/2022       Lab Results   Component Value Date    WBC 8.08 11/09/2022    HGB 15.6 11/09/2022    HCT 45.0 11/09/2022    MCV 91.5 11/09/2022     11/09/2022           Current Outpatient Medications:     amLODIPine (NORVASC) 5  MG tablet, Take 1 tablet (5 mg total) by mouth once daily., Disp: 90 tablet, Rfl: 1    aspirin (ECOTRIN) 81 MG EC tablet, Take 81 mg by mouth once daily., Disp: , Rfl:     clopidogreL (PLAVIX) 75 mg tablet, Take 1 tablet (75 mg total) by mouth once daily., Disp: 90 tablet, Rfl: 3    diclofenac sodium (VOLTAREN) 1 % Gel, Apply 2 g topically 4 (four) times daily as needed., Disp: , Rfl:     ezetimibe (ZETIA) 10 mg tablet, Take 1 tablet (10 mg total) by mouth once daily., Disp: 90 tablet, Rfl: 3    losartan-hydrochlorothiazide 100-12.5 mg (HYZAAR) 100-12.5 mg Tab, Take 1/2 tablet by mouth daily for HTN., Disp: 45 tablet, Rfl: 1    metoprolol succinate (TOPROL-XL) 25 MG 24 hr tablet, Take 1 tablet (25 mg total) by mouth once daily., Disp: 30 tablet, Rfl: 11    nitroGLYCERIN (NITROSTAT) 0.4 MG SL tablet, Place 1 tablet (0.4 mg total) under the tongue every 5 (five) minutes as needed for Chest pain., Disp: 25 tablet, Rfl: 3    omega-3 fatty acids/fish oil (FISH OIL-OMEGA-3 FATTY ACIDS) 300-1,000 mg capsule, Take 2 capsules by mouth 2 (two) times a day. , Disp: , Rfl:     pantoprazole (PROTONIX) 40 MG tablet, Take 1 tablet (40 mg total) by mouth once daily., Disp: 90 tablet, Rfl: 0    tamsulosin (FLOMAX) 0.4 mg Cap, Take 1 capsule (0.4 mg total) by mouth once daily., Disp: 90 capsule, Rfl: 3    traMADoL (ULTRAM) 50 mg tablet, Take 1 tablet (50 mg total) by mouth every 6 (six) hours as needed for Pain., Disp: 30 each, Rfl: 0    triamcinolone acetonide 0.1% (KENALOG) 0.1 % ointment, Apply topically 2 (two) times daily., Disp: 454 g, Rfl: 0    Current Facility-Administered Medications:     sodium hyaluronate (supartz) injection 20 mg, 20 mg, Intra-articular, 1 time in Clinic/HOD, [facility] [place]  Meds have been reviewed but not reconciled. He did not bring his meds.     Review of Systems   Respiratory:  Negative for shortness of breath.    Cardiovascular:  Positive for chest pain. Negative for palpitations, orthopnea,  "claudication and leg swelling.   Gastrointestinal:  Negative for diarrhea and nausea.   Neurological:  Negative for dizziness and weakness.       Objective:   /72 (BP Location: Left arm, Patient Position: Sitting)   Pulse 66   Ht 5' 9" (1.753 m)   Wt 97.3 kg (214 lb 6.4 oz)   SpO2 98%   BMI 31.66 kg/m²     Physical Exam  Vitals and nursing note reviewed.   Constitutional:       Appearance: Normal appearance. He is obese.   HENT:      Head: Normocephalic and atraumatic.   Cardiovascular:      Rate and Rhythm: Normal rate and regular rhythm.      Pulses: Normal pulses.      Heart sounds: Normal heart sounds.   Pulmonary:      Effort: Pulmonary effort is normal.      Breath sounds: Normal breath sounds.   Abdominal:      General: Bowel sounds are normal.      Palpations: Abdomen is soft.   Musculoskeletal:      Cervical back: Neck supple.      Right lower leg: No edema.      Left lower leg: No edema.   Skin:     General: Skin is warm and dry.      Capillary Refill: Capillary refill takes less than 2 seconds.   Neurological:      General: No focal deficit present.      Mental Status: He is alert.         Assessment:     1. Coronary artery disease due to lipid rich plaque  EKG 12-lead    EKG 12-lead    nitroGLYCERIN (NITROSTAT) 0.4 MG SL tablet    metoprolol succinate (TOPROL-XL) 25 MG 24 hr tablet      2. Stable angina  nitroGLYCERIN (NITROSTAT) 0.4 MG SL tablet    metoprolol succinate (TOPROL-XL) 25 MG 24 hr tablet      3. Coronary artery disease involving native coronary artery of native heart with angina pectoris              Plan:   Stable angina  [facility] with  of the LAD/LCX and collaterals from the RCA. S/p LENA to the RCA  Chest discomfort x 2 since his LENA both while walking for exercise (approx 75 feet) resolved with rest  EKG  Prn nitrates  Add low dose Toprol XL  Continue Norvasc    Coronary artery disease involving native coronary artery of native heart with angina pectoris  [facility] 11/15/2022  3-V CAD " "with  of the LAD and LCX  LENA to the RCA  Collateral circulation from the RCA to the LAD and LCX  Continue plavix 75 mg po daily  Patient has not taken ASA since dc from the hospital- we will restart  Patient is "intolerant of all statins" using Zetia.  D/w Dr. Zapata- we will use medical management for stable angina. If this is not successful can consider referral to be considered  revascularization.   RTC 4-6 weels    "

## 2022-12-27 NOTE — ASSESSMENT & PLAN NOTE
"[facility] 11/15/2022  3-V CAD with  of the LAD and LCX  LENA to the RCA  Collateral circulation from the RCA to the LAD and LCX  Continue plavix 75 mg po daily  Patient has not taken ASA since dc from the hospital- we will restart  Patient is "intolerant of all statins" using Zetia.  "

## 2023-01-17 DIAGNOSIS — I25.119 CORONARY ARTERY DISEASE INVOLVING NATIVE CORONARY ARTERY OF NATIVE HEART WITH ANGINA PECTORIS: Primary | ICD-10-CM

## 2023-01-17 DIAGNOSIS — Z86.73 HISTORY OF CVA (CEREBROVASCULAR ACCIDENT): ICD-10-CM

## 2023-01-17 RX ORDER — CLOPIDOGREL BISULFATE 75 MG/1
75 TABLET ORAL DAILY
Qty: 90 TABLET | Refills: 3 | Status: SHIPPED | OUTPATIENT
Start: 2023-01-17 | End: 2024-01-09 | Stop reason: SDUPTHER

## 2023-01-17 NOTE — TELEPHONE ENCOUNTER
----- Message from Janine Retana sent at 1/17/2023  9:00 AM CST -----  Patient needs a refill on Plavix  called into [facility] pharmacy.  Please call patient at [phone]  if you have any questions. Thanks!

## 2023-01-31 ENCOUNTER — OFFICE VISIT (OUTPATIENT)
Dept: CARDIOLOGY | Facility: CLINIC | Age: 83
End: 2023-01-31
Payer: MEDICARE

## 2023-01-31 DIAGNOSIS — I25.10 CORONARY ARTERY DISEASE DUE TO LIPID RICH PLAQUE: Primary | Chronic | ICD-10-CM

## 2023-01-31 DIAGNOSIS — I10 ESSENTIAL HYPERTENSION: Chronic | ICD-10-CM

## 2023-01-31 DIAGNOSIS — E78.2 MIXED HYPERLIPIDEMIA: Chronic | ICD-10-CM

## 2023-01-31 DIAGNOSIS — M17.0 PRIMARY OSTEOARTHRITIS OF BOTH KNEES: Chronic | ICD-10-CM

## 2023-01-31 DIAGNOSIS — I25.83 CORONARY ARTERY DISEASE DUE TO LIPID RICH PLAQUE: Primary | Chronic | ICD-10-CM

## 2023-01-31 PROCEDURE — 1160F PR REVIEW ALL MEDS BY PRESCRIBER/CLIN PHARMACIST DOCUMENTED: ICD-10-PCS | Mod: CPTII,,, | Performed by: INTERNAL MEDICINE

## 2023-01-31 PROCEDURE — 1160F RVW MEDS BY RX/DR IN RCRD: CPT | Mod: CPTII,,, | Performed by: INTERNAL MEDICINE

## 2023-01-31 PROCEDURE — 99214 PR OFFICE/OUTPT VISIT, EST, LEVL IV, 30-39 MIN: ICD-10-PCS | Mod: S$PBB,,, | Performed by: INTERNAL MEDICINE

## 2023-01-31 PROCEDURE — 1159F PR MEDICATION LIST DOCUMENTED IN MEDICAL RECORD: ICD-10-PCS | Mod: CPTII,,, | Performed by: INTERNAL MEDICINE

## 2023-01-31 PROCEDURE — 99214 OFFICE O/P EST MOD 30 MIN: CPT | Mod: S$PBB,,, | Performed by: INTERNAL MEDICINE

## 2023-01-31 PROCEDURE — 1159F MED LIST DOCD IN RCRD: CPT | Mod: CPTII,,, | Performed by: INTERNAL MEDICINE

## 2023-01-31 PROCEDURE — 99212 OFFICE O/P EST SF 10 MIN: CPT | Mod: PBBFAC | Performed by: INTERNAL MEDICINE

## 2023-02-03 NOTE — PROGRESS NOTES
PCP: AR Friedman    Referring Provider:     Subjective:   Cj Thomas is a 82 y.o. male with hx of HLD, HTN and CAD who presents for follow up.     Fhx:Mother  age 90 + HD and CABG  Father  age 73 of MI  Sisters x 5 without known health issues  Shx: Quit tobacco 30 years ago; - ETOH and recreational drugs    EK2022(reviewed with Dr. Zapata) RSR with PAC's HR 65 bpm; anterolateral TWA     Echo    10/28/22--Interpretation Summary  · The left ventricle is normal in size with concentric remodeling and low normal systolic function.  · The estimated ejection fraction is 50%.  · Left ventricular diastolic dysfunction.  · Normal right ventricular size with normal right ventricular systolic function.  · Mild mitral regurgitation.  · Mild tricuspid regurgitation.  · Mild pulmonic regurgitation.  · There are segmental left ventricular wall motion abnormalities.     Lexiscan:    10/31/22--1. Very large anterior and septal wall perfusion defect extending to the apex in stress images which partially improves in rest images and is consistent with an area of ischemia and scar.  2. Normal left ventricular size with hypokinesis of the distal 1/2 of the interventricular septum and overall mildly impaired left ventricular systolic function, ejection fraction 43%.      Cardiac catheterization    11/15/22--Conclusion    The RPDA lesion was 50% stenosed.    The Mid LAD lesion was 100% stenosed.    The Prox RCA lesion was 70% stenosed with 0% stenosis post-intervention.    The Mid RCA lesion was 90% stenosed with 0% stenosis post-intervention.    The Ramus lesion was 75% stenosed.    The Prox Cx to Mid Cx lesion was 100% stenosed.    The ejection fraction was calculated to be 55%.    The left ventricular systolic function was normal.    The left ventricular end diastolic pressure was elevated.    The pre-procedure left ventricular end diastolic pressure was 23.    A  at 20 MAUREEN for 14 sec.    The  estimated blood loss was none.    There was three vessel coronary artery disease.    There was no mitral valve regurgitation.  Stent to pRCA- [place] 3.5 x 38 .  The procedure log was documented by Documenter: Claude Jansen RN and verified by Roby Zapata MD.    Date: 11/15/2022  Time: 10:49 AM       Lab Results   Component Value Date     (L) 11/09/2022    K 4.0 11/09/2022     11/09/2022    CO2 30 11/09/2022    BUN 10 11/09/2022    CREATININE 0.98 11/09/2022    CALCIUM 9.0 11/09/2022    ANIONGAP 8 11/09/2022    EGFRNONAA 86 04/18/2022       Lab Results   Component Value Date    CHOL 209 (H) 10/25/2022    CHOL 240 (H) 04/18/2022     Lab Results   Component Value Date    HDL 45 10/25/2022    HDL 43 04/18/2022     Lab Results   Component Value Date    LDLCALC 132 10/25/2022    LDLCALC 160 04/18/2022     Lab Results   Component Value Date    TRIG 159 (H) 10/25/2022    TRIG 187 (H) 04/18/2022     Lab Results   Component Value Date    CHOLHDL 4.6 10/25/2022    CHOLHDL 5.6 04/18/2022       Lab Results   Component Value Date    WBC 8.08 11/09/2022    HGB 15.6 11/09/2022    HCT 45.0 11/09/2022    MCV 91.5 11/09/2022     11/09/2022           Current Outpatient Medications:     amLODIPine (NORVASC) 5 MG tablet, Take 1 tablet (5 mg total) by mouth once daily., Disp: 90 tablet, Rfl: 1    aspirin (ECOTRIN) 81 MG EC tablet, Take 81 mg by mouth once daily., Disp: , Rfl:     clopidogreL (PLAVIX) 75 mg tablet, Take 1 tablet (75 mg total) by mouth once daily., Disp: 90 tablet, Rfl: 3    diclofenac sodium (VOLTAREN) 1 % Gel, Apply 2 g topically 4 (four) times daily as needed., Disp: , Rfl:     losartan-hydrochlorothiazide 100-12.5 mg (HYZAAR) 100-12.5 mg Tab, Take 1/2 tablet by mouth daily for HTN., Disp: 45 tablet, Rfl: 1    metoprolol succinate (TOPROL-XL) 25 MG 24 hr tablet, Take 1 tablet (25 mg total) by mouth once daily., Disp: 90 tablet, Rfl: 3    nitroGLYCERIN (NITROSTAT) 0.4 MG SL tablet, Place 1  tablet (0.4 mg total) under the tongue every 5 (five) minutes as needed for Chest pain., Disp: 25 tablet, Rfl: 3    omega-3 fatty acids/fish oil (FISH OIL-OMEGA-3 FATTY ACIDS) 300-1,000 mg capsule, Take 2 capsules by mouth 2 (two) times a day. , Disp: , Rfl:     pantoprazole (PROTONIX) 40 MG tablet, Take 1 tablet (40 mg total) by mouth once daily., Disp: 90 tablet, Rfl: 0    rosuvastatin (CRESTOR) 10 MG tablet, Take 1 tablet (10 mg total) by mouth once daily., Disp: 90 tablet, Rfl: 3    tamsulosin (FLOMAX) 0.4 mg Cap, Take 1 capsule (0.4 mg total) by mouth once daily., Disp: 90 capsule, Rfl: 3    traMADoL (ULTRAM) 50 mg tablet, Take 1 tablet (50 mg total) by mouth every 6 (six) hours as needed for Pain., Disp: 30 each, Rfl: 0    triamcinolone acetonide 0.1% (KENALOG) 0.1 % ointment, Apply topically 2 (two) times daily., Disp: 454 g, Rfl: 0    Current Facility-Administered Medications:     sodium hyaluronate (supartz) injection 20 mg, 20 mg, Intra-articular, 1 time in Clinic/HOD, [facility] [place]  Meds have been reviewed but not reconciled. He did not bring his meds.     Review of Systems   Respiratory:  Negative for shortness of breath.    Cardiovascular:  Negative for chest pain, palpitations, orthopnea and leg swelling.   Gastrointestinal:  Negative for diarrhea and nausea.   Neurological:  Negative for loss of consciousness.       Objective:   There were no vitals taken for this visit.    Physical Exam  Vitals reviewed.   Constitutional:       Appearance: Normal appearance. He is obese.   HENT:      Head: Normocephalic and atraumatic.   Neck:      Vascular: No carotid bruit or JVD.   Cardiovascular:      Rate and Rhythm: Normal rate and regular rhythm.      Pulses: Normal pulses.           Radial pulses are 2+ on the right side and 2+ on the left side.      Heart sounds: Normal heart sounds. No murmur heard.  Pulmonary:      Effort: Pulmonary effort is normal.      Breath sounds: Normal breath sounds.    Musculoskeletal:      Right lower leg: No edema.      Left lower leg: No edema.   Skin:     General: Skin is warm and dry.   Neurological:      Mental Status: He is alert and oriented to person, place, and time.         Assessment:     1. Coronary artery disease due to lipid rich plaque  metoprolol succinate (TOPROL-XL) 25 MG 24 hr tablet    s/p stent pRCA 11/15/22      2. Essential hypertension        3. Mixed hyperlipidemia        4. Primary osteoarthritis of both knees      pending left knee surgery            Plan:   Stop Zetia, pt had stopped about one week ago due to rash, it did not improve.  Start Crestor 10 mg   FLP/ ALT in 6-8 weeks.  Mildly increased cardiac risk for surgery.  May hold Plavix 5-7 days prior to surgery.  Continue beta blockers perioperatively.    RTC 6 months.

## 2023-02-08 ENCOUNTER — TELEPHONE (OUTPATIENT)
Dept: ORTHOPEDICS | Facility: CLINIC | Age: 83
End: 2023-02-08
Payer: MEDICARE

## 2023-02-08 DIAGNOSIS — M23.92 INTERNAL DERANGEMENT OF LEFT KNEE: Primary | ICD-10-CM

## 2023-02-08 RX ORDER — METOPROLOL SUCCINATE 25 MG/1
25 TABLET, EXTENDED RELEASE ORAL DAILY
Qty: 90 TABLET | Refills: 3 | Status: SHIPPED | OUTPATIENT
Start: 2023-02-08 | End: 2024-02-08 | Stop reason: SDUPTHER

## 2023-02-08 RX ORDER — ROSUVASTATIN CALCIUM 10 MG/1
10 TABLET, COATED ORAL DAILY
Qty: 90 TABLET | Refills: 3 | Status: SHIPPED | OUTPATIENT
Start: 2023-02-08 | End: 2024-02-08

## 2023-02-08 NOTE — TELEPHONE ENCOUNTER
2/7/23 @ 1300 pt wife stopped by clinic requesting to move surgery up on left knee from March 7, 2023 2/8/23 @ 1109 attempted to call pt about moving surgery up no answer voice msg left  2/8/23 @ 1230 pt returned called. Pt states that Dr. Zapata has cleared him to have a knee scope and would like to move the surgery up from march 7, 2023 to February 21, 2023. Surgery reschedule to feb 21, 2023.

## 2023-02-15 ENCOUNTER — OFFICE VISIT (OUTPATIENT)
Dept: ORTHOPEDICS | Facility: CLINIC | Age: 83
End: 2023-02-15
Payer: MEDICARE

## 2023-02-15 DIAGNOSIS — M23.92 INTERNAL DERANGEMENT OF LEFT KNEE: Primary | ICD-10-CM

## 2023-02-15 PROCEDURE — 99499 NO LOS: ICD-10-PCS | Mod: S$PBB,,, | Performed by: NURSE PRACTITIONER

## 2023-02-15 PROCEDURE — 1159F PR MEDICATION LIST DOCUMENTED IN MEDICAL RECORD: ICD-10-PCS | Mod: CPTII,,, | Performed by: NURSE PRACTITIONER

## 2023-02-15 PROCEDURE — 1160F PR REVIEW ALL MEDS BY PRESCRIBER/CLIN PHARMACIST DOCUMENTED: ICD-10-PCS | Mod: CPTII,,, | Performed by: NURSE PRACTITIONER

## 2023-02-15 PROCEDURE — 1159F MED LIST DOCD IN RCRD: CPT | Mod: CPTII,,, | Performed by: NURSE PRACTITIONER

## 2023-02-15 PROCEDURE — 99213 OFFICE O/P EST LOW 20 MIN: CPT | Mod: PBBFAC | Performed by: NURSE PRACTITIONER

## 2023-02-15 PROCEDURE — 1160F RVW MEDS BY RX/DR IN RCRD: CPT | Mod: CPTII,,, | Performed by: NURSE PRACTITIONER

## 2023-02-15 PROCEDURE — 99499 UNLISTED E&M SERVICE: CPT | Mod: S$PBB,,, | Performed by: NURSE PRACTITIONER

## 2023-02-15 NOTE — PROGRESS NOTES
82-year-old male patient presents orthopedic clinic update his chart.  He is known to orthopedic clinic being followed for left knee pain.  He had MRI performed on his left knee on 10/17/2022 which showed complex tear of the medial meniscus with extrusion.  He was also noted have medial tibial plateau edema suggestive of micro stress fracture.  He was scheduled for knee arthroscopy.  Prior to his scheduled surgery he was seen by his primary care provider.  He had complaint of indigestion.  He was sent to cardiology subsequently had left heart catheterization was found to have coronary artery disease requiring stent placement.  His cardiologist has cleared him to proceed with surgery 3 months after having stent placement.  He will be allowed to discontinue his Plavix for 7-10 days prior to surgery.  Cardiology recommends continue beta-blocker during the procedure.    PE general he is awake alert oriented independently.  No acute distress noted.  Ambulates with a slight antalgic limp on the left.    Impression:  Internal derangement left knee.      Plan:  Safety guidelines activity restrictions discussed patient.  He was to bring all his medications from home with him.  He states he is had a new medication added by dermatology.  He is aware he or see phone call the afternoon prior to surgery for his surgery date show time.  Also he is to hold his Plavix at this time.

## 2023-02-15 NOTE — PATIENT INSTRUCTIONS
Safety guidelines activity restrictions discussed patient.  He was to bring all his medications from home with him.  He states he is had a new medication added by dermatology.  He is aware he or see phone call the afternoon prior to surgery for his surgery date show time.  Also he is to hold his Plavix at this time.

## 2023-02-21 ENCOUNTER — ANESTHESIA EVENT (OUTPATIENT)
Dept: SURGERY | Facility: HOSPITAL | Age: 83
End: 2023-02-21
Payer: MEDICARE

## 2023-02-21 ENCOUNTER — ANESTHESIA (OUTPATIENT)
Dept: SURGERY | Facility: HOSPITAL | Age: 83
End: 2023-02-21
Payer: MEDICARE

## 2023-02-21 ENCOUNTER — HOSPITAL ENCOUNTER (OUTPATIENT)
Facility: HOSPITAL | Age: 83
Discharge: HOME | End: 2023-02-21
Attending: ORTHOPAEDIC SURGERY | Admitting: ORTHOPAEDIC SURGERY
Payer: MEDICARE

## 2023-02-21 VITALS
SYSTOLIC BLOOD PRESSURE: 135 MMHG | WEIGHT: 215 LBS | HEIGHT: 71 IN | RESPIRATION RATE: 18 BRPM | TEMPERATURE: 98 F | HEART RATE: 69 BPM | OXYGEN SATURATION: 96 % | BODY MASS INDEX: 30.1 KG/M2 | DIASTOLIC BLOOD PRESSURE: 83 MMHG

## 2023-02-21 DIAGNOSIS — M23.92 INTERNAL DERANGEMENT OF LEFT KNEE: Primary | ICD-10-CM

## 2023-02-21 PROCEDURE — 27000716 HC OXISENSOR PROBE, ANY SIZE: Performed by: NURSE ANESTHETIST, CERTIFIED REGISTERED

## 2023-02-21 PROCEDURE — 36000710: Performed by: ORTHOPAEDIC SURGERY

## 2023-02-21 PROCEDURE — 27000655: Performed by: NURSE ANESTHETIST, CERTIFIED REGISTERED

## 2023-02-21 PROCEDURE — D9220A PRA ANESTHESIA: Mod: CRNA,,, | Performed by: NURSE ANESTHETIST, CERTIFIED REGISTERED

## 2023-02-21 PROCEDURE — 27201423 OPTIME MED/SURG SUP & DEVICES STERILE SUPPLY: Performed by: ORTHOPAEDIC SURGERY

## 2023-02-21 PROCEDURE — D9220A PRA ANESTHESIA: ICD-10-PCS | Mod: CRNA,,, | Performed by: NURSE ANESTHETIST, CERTIFIED REGISTERED

## 2023-02-21 PROCEDURE — 27000177 HC AIRWAY, LARYNGEAL MASK: Performed by: NURSE ANESTHETIST, CERTIFIED REGISTERED

## 2023-02-21 PROCEDURE — 71000016 HC POSTOP RECOV ADDL HR: Performed by: ORTHOPAEDIC SURGERY

## 2023-02-21 PROCEDURE — 25000003 PHARM REV CODE 250: Performed by: ORTHOPAEDIC SURGERY

## 2023-02-21 PROCEDURE — 71000033 HC RECOVERY, INTIAL HOUR: Performed by: ORTHOPAEDIC SURGERY

## 2023-02-21 PROCEDURE — 37000008 HC ANESTHESIA 1ST 15 MINUTES: Performed by: ORTHOPAEDIC SURGERY

## 2023-02-21 PROCEDURE — 71000015 HC POSTOP RECOV 1ST HR: Performed by: ORTHOPAEDIC SURGERY

## 2023-02-21 PROCEDURE — 29880 ARTHRS KNE SRG MNISECTMY M&L: CPT | Mod: LT,,, | Performed by: ORTHOPAEDIC SURGERY

## 2023-02-21 PROCEDURE — 25000003 PHARM REV CODE 250: Performed by: NURSE ANESTHETIST, CERTIFIED REGISTERED

## 2023-02-21 PROCEDURE — 29880 PR KNEE SCOPE MED/LAT MENISCECTOMY: ICD-10-PCS | Mod: LT,,, | Performed by: ORTHOPAEDIC SURGERY

## 2023-02-21 PROCEDURE — 63600175 PHARM REV CODE 636 W HCPCS: Performed by: ANESTHESIOLOGY

## 2023-02-21 PROCEDURE — D9220A PRA ANESTHESIA: Mod: ANES,,, | Performed by: ANESTHESIOLOGY

## 2023-02-21 PROCEDURE — 97161 PT EVAL LOW COMPLEX 20 MIN: CPT

## 2023-02-21 PROCEDURE — 36000711: Performed by: ORTHOPAEDIC SURGERY

## 2023-02-21 PROCEDURE — 63600175 PHARM REV CODE 636 W HCPCS: Performed by: NURSE ANESTHETIST, CERTIFIED REGISTERED

## 2023-02-21 PROCEDURE — D9220A PRA ANESTHESIA: ICD-10-PCS | Mod: ANES,,, | Performed by: ANESTHESIOLOGY

## 2023-02-21 PROCEDURE — 37000009 HC ANESTHESIA EA ADD 15 MINS: Performed by: ORTHOPAEDIC SURGERY

## 2023-02-21 RX ORDER — FENTANYL CITRATE 50 UG/ML
INJECTION, SOLUTION INTRAMUSCULAR; INTRAVENOUS
Status: DISCONTINUED | OUTPATIENT
Start: 2023-02-21 | End: 2023-02-21

## 2023-02-21 RX ORDER — MORPHINE SULFATE 10 MG/ML
4 INJECTION INTRAMUSCULAR; INTRAVENOUS; SUBCUTANEOUS EVERY 5 MIN PRN
Status: DISCONTINUED | OUTPATIENT
Start: 2023-02-21 | End: 2023-02-21 | Stop reason: HOSPADM

## 2023-02-21 RX ORDER — HYDROCODONE BITARTRATE AND ACETAMINOPHEN 5; 325 MG/1; MG/1
1 TABLET ORAL EVERY 4 HOURS PRN
Status: DISCONTINUED | OUTPATIENT
Start: 2023-02-21 | End: 2023-02-21 | Stop reason: HOSPADM

## 2023-02-21 RX ORDER — ACETAMINOPHEN 500 MG
1000 TABLET ORAL EVERY 6 HOURS PRN
Status: DISCONTINUED | OUTPATIENT
Start: 2023-02-21 | End: 2023-02-21 | Stop reason: HOSPADM

## 2023-02-21 RX ORDER — DIPHENHYDRAMINE HYDROCHLORIDE 50 MG/ML
25 INJECTION INTRAMUSCULAR; INTRAVENOUS EVERY 6 HOURS PRN
Status: DISCONTINUED | OUTPATIENT
Start: 2023-02-21 | End: 2023-02-21 | Stop reason: HOSPADM

## 2023-02-21 RX ORDER — ONDANSETRON 2 MG/ML
INJECTION INTRAMUSCULAR; INTRAVENOUS
Status: DISCONTINUED | OUTPATIENT
Start: 2023-02-21 | End: 2023-02-21

## 2023-02-21 RX ORDER — HYDROMORPHONE HYDROCHLORIDE 2 MG/ML
0.5 INJECTION, SOLUTION INTRAMUSCULAR; INTRAVENOUS; SUBCUTANEOUS EVERY 5 MIN PRN
Status: DISCONTINUED | OUTPATIENT
Start: 2023-02-21 | End: 2023-02-21 | Stop reason: HOSPADM

## 2023-02-21 RX ORDER — PROMETHAZINE HYDROCHLORIDE 25 MG/1
25 TABLET ORAL EVERY 6 HOURS PRN
Status: DISCONTINUED | OUTPATIENT
Start: 2023-02-21 | End: 2023-02-21 | Stop reason: HOSPADM

## 2023-02-21 RX ORDER — ONDANSETRON 4 MG/1
8 TABLET, ORALLY DISINTEGRATING ORAL EVERY 8 HOURS PRN
Status: DISCONTINUED | OUTPATIENT
Start: 2023-02-21 | End: 2023-02-21 | Stop reason: HOSPADM

## 2023-02-21 RX ORDER — SODIUM CHLORIDE 9 MG/ML
INJECTION, SOLUTION INTRAVENOUS CONTINUOUS
Status: DISCONTINUED | OUTPATIENT
Start: 2023-02-21 | End: 2023-02-21 | Stop reason: HOSPADM

## 2023-02-21 RX ORDER — KETOROLAC TROMETHAMINE 30 MG/ML
INJECTION, SOLUTION INTRAMUSCULAR; INTRAVENOUS
Status: DISCONTINUED | OUTPATIENT
Start: 2023-02-21 | End: 2023-02-21

## 2023-02-21 RX ORDER — DEXAMETHASONE SODIUM PHOSPHATE 4 MG/ML
INJECTION, SOLUTION INTRA-ARTICULAR; INTRALESIONAL; INTRAMUSCULAR; INTRAVENOUS; SOFT TISSUE
Status: DISCONTINUED | OUTPATIENT
Start: 2023-02-21 | End: 2023-02-21

## 2023-02-21 RX ORDER — CEFAZOLIN SODIUM 1 G/3ML
INJECTION, POWDER, FOR SOLUTION INTRAMUSCULAR; INTRAVENOUS
Status: DISCONTINUED | OUTPATIENT
Start: 2023-02-21 | End: 2023-02-21

## 2023-02-21 RX ORDER — LIDOCAINE HYDROCHLORIDE 20 MG/ML
INJECTION, SOLUTION EPIDURAL; INFILTRATION; INTRACAUDAL; PERINEURAL
Status: DISCONTINUED | OUTPATIENT
Start: 2023-02-21 | End: 2023-02-21

## 2023-02-21 RX ORDER — PROPOFOL 10 MG/ML
VIAL (ML) INTRAVENOUS
Status: DISCONTINUED | OUTPATIENT
Start: 2023-02-21 | End: 2023-02-21

## 2023-02-21 RX ORDER — HYDROCODONE BITARTRATE AND ACETAMINOPHEN 5; 325 MG/1; MG/1
1 TABLET ORAL EVERY 6 HOURS PRN
Qty: 28 TABLET | Refills: 0 | Status: SHIPPED | OUTPATIENT
Start: 2023-02-21 | End: 2023-02-28

## 2023-02-21 RX ORDER — ONDANSETRON 2 MG/ML
4 INJECTION INTRAMUSCULAR; INTRAVENOUS DAILY PRN
Status: DISCONTINUED | OUTPATIENT
Start: 2023-02-21 | End: 2023-02-21 | Stop reason: HOSPADM

## 2023-02-21 RX ORDER — EPHEDRINE SULFATE 50 MG/ML
INJECTION, SOLUTION INTRAVENOUS
Status: DISCONTINUED | OUTPATIENT
Start: 2023-02-21 | End: 2023-02-21

## 2023-02-21 RX ORDER — HYDROCODONE BITARTRATE AND ACETAMINOPHEN 10; 325 MG/1; MG/1
1 TABLET ORAL EVERY 4 HOURS PRN
Status: DISCONTINUED | OUTPATIENT
Start: 2023-02-21 | End: 2023-02-21 | Stop reason: HOSPADM

## 2023-02-21 RX ADMIN — SODIUM CHLORIDE: 9 INJECTION, SOLUTION INTRAVENOUS at 11:02

## 2023-02-21 RX ADMIN — SODIUM CHLORIDE: 9 INJECTION, SOLUTION INTRAVENOUS at 12:02

## 2023-02-21 RX ADMIN — CEFAZOLIN 2 G: 1 INJECTION, POWDER, FOR SOLUTION INTRAMUSCULAR; INTRAVENOUS; PARENTERAL at 11:02

## 2023-02-21 RX ADMIN — EPHEDRINE SULFATE 10 MG: 50 INJECTION INTRAVENOUS at 11:02

## 2023-02-21 RX ADMIN — ONDANSETRON 4 MG: 2 INJECTION INTRAMUSCULAR; INTRAVENOUS at 11:02

## 2023-02-21 RX ADMIN — LIDOCAINE HYDROCHLORIDE 40 MG: 20 INJECTION, SOLUTION INTRAVENOUS at 11:02

## 2023-02-21 RX ADMIN — DEXAMETHASONE SODIUM PHOSPHATE 8 MG: 4 INJECTION, SOLUTION INTRA-ARTICULAR; INTRALESIONAL; INTRAMUSCULAR; INTRAVENOUS; SOFT TISSUE at 11:02

## 2023-02-21 RX ADMIN — PROPOFOL 140 MG: 10 INJECTION, EMULSION INTRAVENOUS at 11:02

## 2023-02-21 RX ADMIN — FENTANYL CITRATE 50 MCG: 50 INJECTION INTRAMUSCULAR; INTRAVENOUS at 11:02

## 2023-02-21 RX ADMIN — DIPHENHYDRAMINE HYDROCHLORIDE 12.5 MG: 50 INJECTION INTRAMUSCULAR; INTRAVENOUS at 12:02

## 2023-02-21 RX ADMIN — KETOROLAC TROMETHAMINE 15 MG: 30 INJECTION, SOLUTION INTRAMUSCULAR at 12:02

## 2023-02-21 NOTE — ANESTHESIA PROCEDURE NOTES
Intubation    Date/Time: 2/21/2023 11:08 AM  Performed by: Melissa Hills CRNA  Authorized by: Noman Sanchez DO     Intubation:     Induction:  Intravenous    Intubated:  Postinduction    Mask Ventilation:  N/a    Attempts:  1    Attempted By:  CRNA    Method of Intubation:  Other (see comments)    Difficult Airway Encountered?: No      Complications:  None    Airway Device:  Supraglottic airway/LMA    Airway Device Size:  4.0    Style/Cuff Inflation:  Cuffed (inflated to minimal occlusive pressure)    Placement Verified By:  Capnometry    Complicating Factors:  None    Findings Post-Intubation:  Atraumatic/condition of teeth unchanged and BS equal bilateral

## 2023-02-21 NOTE — OR NURSING
1215 Rec'd pt to PACU asleep with oral airway in place. VSS, no signs of distress noted. Left knee dressing C/D/I. Left posterior tibialis pulse 2+, dorsalis pedis pulse present and equal per doppler, cap refill less than 3 seconds. No needs at this time. Will continue to monitor.     1235 Oral airway removed. Respirations even and unlabored. Reoriented to surroundings. Denies pain/needs at this time.     1242 Pt scratching chest/upper abd and c/o itching. No rash/discoloration noted. IV benadryl given, see MAR for admin.     1250 Out of PACU. VSS. No signs of bleeding/distress noted. Pt states itching relieved.     1255 Pt to ASC 13 awake and alert with no distress noted, respirations even and unlabored. Family at bedside. Bedside report given to CAMMIE Slater RN. Left knee dressing C/D/I, posterior tibialis pulse 2+, dorsalis pedis pulse present and equal per doppler, cap refill less than 3 seconds. Able to wiggle toes on command. Denies pain/needs. /83, P 68, R 16, O2 96% room air.

## 2023-02-21 NOTE — DISCHARGE SUMMARY
"[facility] - Orthopedic Periop Services  Discharge Note  Short Stay    Procedure(s) (LRB):  ARTHROSCOPY, KNEE (Left)  CARTILAGE-SHAVING (Left)  ARTHROSCOPY, KNEE, WITH MENISCECTOMY (Left)      OUTCOME: Patient tolerated treatment/procedure well without complication and is now ready for discharge.    DISPOSITION: Home or Self Care    FINAL DIAGNOSIS:  Acute medial meniscus tear of left knee    FOLLOWUP: In clinic    DISCHARGE INSTRUCTIONS:    Discharge Procedure Orders   WALKER FOR HOME USE     Order Specific Question Answer Comments   Type of Walker: Adult (5'4"-6'6")    With wheels? No    Height: 5' 11" (1.803 m)    Weight: 97.5 kg (215 lb)    Length of need (1-99 months): 2    Please check all that apply: Patient's condition impairs ambulation.      Diet general     Keep surgical extremity elevated     Ice to affected area   Order Comments: using barrier between ice and skin (specify duration&frequency)     Change dressing (specify)   Order Comments: Dressing change: one time per day beginning 72 hours post op.     Call MD for:  temperature >100.4     Call MD for:  persistent nausea and vomiting     Call MD for:  severe uncontrolled pain     Call MD for:  difficulty breathing, headache or visual disturbances     Call MD for:  redness, tenderness, or signs of infection (pain, swelling, redness, odor or green/yellow discharge around incision site)     Call MD for:  hives     Call MD for:  persistent dizziness or light-headedness     Call MD for:  extreme fatigue     Remove dressing in 48 hours     Activity as tolerated     Shower on day dressing removed (No bath)     Weight bearing as tolerated        TIME SPENT ON DISCHARGE: 15 minutes  "

## 2023-02-21 NOTE — PLAN OF CARE
Problem: Physical Therapy  Goal: Physical Therapy Goal  Description: Short Term Goals  Independent with HEP  Independent with crutchesx 10 feet TDWB: left lower extremity    Long term goals  Needed equipment for home.       Outcome: Met

## 2023-02-21 NOTE — TRANSFER OF CARE
"Anesthesia Transfer of Care Note    Patient: Cj Thomas    Procedure(s) Performed: Procedure(s) (LRB):  ARTHROSCOPY, KNEE (Left)    Patient location: PACU    Anesthesia Type: general    Transport from OR: Transported from OR on 6-10 L/min O2 by face mask with adequate spontaneous ventilation    Post pain: adequate analgesia    Post assessment: no apparent anesthetic complications    Post vital signs: stable    Level of consciousness: awake and alert    Nausea/Vomiting: no nausea/vomiting    Complications: none    Transfer of care protocol was followed      Last vitals:   Visit Vitals  /65   Pulse 69   Temp 36.4 °C (97.6 °F)   Resp 14   Ht 5' 11" (1.803 m)   Wt 97.5 kg (215 lb)   SpO2 96%   BMI 29.99 kg/m²     "

## 2023-02-21 NOTE — INTERVAL H&P NOTE
The patient has been examined and the H&P has been reviewed:    I concur with the findings and no changes have occurred since H&P was written.    Surgery risks, benefits and alternative options discussed and understood by patient/family.          Active Hospital Problems    Diagnosis  POA    *Acute medial meniscus tear of left knee [S82.585A]  Yes      Resolved Hospital Problems   No resolved problems to display.

## 2023-02-21 NOTE — H&P
[facility] - Orthopedic Periop Services  Orthopedics  H&P    Patient Name: Cj Thomas  MRN: [id number]  Admission Date: (Not on file)  Primary Care Provider: AR Friedman    Patient information was obtained from patient and ER records.     Subjective:     Principal Problem:Acute medial meniscus tear of left knee    Chief Complaint: No chief complaint on file.       HPI: Chief complaint:  Left knee pain  History:82-year-old male patient presents orthopedic clinic update his chart.  He is known to orthopedic clinic being followed for left knee pain.  He had MRI performed on his left knee on 10/17/2022 which showed complex tear of the medial meniscus with extrusion.  He was also noted have medial tibial plateau edema suggestive of micro stress fracture.  He was scheduled for knee arthroscopy.  Prior to his scheduled surgery he was seen by his primary care provider.  He had complaint of indigestion.  He was sent to cardiology subsequently had left heart catheterization was found to have coronary artery disease requiring stent placement.  His cardiologist has cleared him to proceed with surgery 3 months after having stent placement.  He will be allowed to discontinue his Plavix for 7-10 days prior to surgery.  Cardiology recommends continue beta-blocker during the procedure.  X-rays left knee three views 09/29/2022 shows the bones well mineralized.  There is minimal joint space narrowing.  No evidence of acute fracture, dislocation or pathologic bone.      Past Medical History:   Diagnosis Date    Abnormal echocardiogram 11/10/2022    Anticoagulant long-term use     Benign prostate hyperplasia     Coronary artery disease involving native coronary artery of native heart with angina pectoris     COVID-19 02/10/2022    GERD (gastroesophageal reflux disease)     Hyperlipidemia     Hypertension     Osteoarthritis     Stroke        Past Surgical History:   Procedure Laterality Date    LEFT HEART  "CATHETERIZATION Right 11/15/2022    Procedure: Left heart cath;  Surgeon: Roby Zapata MD;  Location: [facility] CATH LAB;  Service: Cardiology;  Laterality: Right;  right wrist    TONSILLECTOMY      TOTAL KNEE ARTHROPLASTY Right        Review of patient's allergies indicates:   Allergen Reactions    Niacin Swelling     "Not sure"    Demerol [meperidine] Other (See Comments)     "Forgets everything"    Iodine and iodide containing products     Statins-hmg-coa reductase inhibitors      Abnormal behavior. Body aches    Iodinated contrast media Rash and Other (See Comments)     Passes out after       Current Facility-Administered Medications   Medication    sodium hyaluronate (supartz) injection 20 mg     Current Outpatient Medications   Medication Sig    amLODIPine (NORVASC) 5 MG tablet Take 1 tablet (5 mg total) by mouth once daily.    aspirin (ECOTRIN) 81 MG EC tablet Take 81 mg by mouth once daily.    clopidogreL (PLAVIX) 75 mg tablet Take 1 tablet (75 mg total) by mouth once daily.    diclofenac sodium (VOLTAREN) 1 % Gel Apply 2 g topically 4 (four) times daily as needed.    losartan-hydrochlorothiazide 100-12.5 mg (HYZAAR) 100-12.5 mg Tab Take 1/2 tablet by mouth daily for HTN.    metoprolol succinate (TOPROL-XL) 25 MG 24 hr tablet Take 1 tablet (25 mg total) by mouth once daily.    nitroGLYCERIN (NITROSTAT) 0.4 MG SL tablet Place 1 tablet (0.4 mg total) under the tongue every 5 (five) minutes as needed for Chest pain.    omega-3 fatty acids/fish oil (FISH OIL-OMEGA-3 FATTY ACIDS) 300-1,000 mg capsule Take 2 capsules by mouth 2 (two) times a day.     pantoprazole (PROTONIX) 40 MG tablet Take 1 tablet (40 mg total) by mouth once daily.    rosuvastatin (CRESTOR) 10 MG tablet Take 1 tablet (10 mg total) by mouth once daily.    tamsulosin (FLOMAX) 0.4 mg Cap Take 1 capsule (0.4 mg total) by mouth once daily.    traMADoL (ULTRAM) 50 mg tablet Take 1 tablet (50 mg total) by mouth every 6 (six) " hours as needed for Pain.    triamcinolone acetonide 0.1% (KENALOG) 0.1 % ointment Apply topically 2 (two) times daily.     Family History       Problem Relation (Age of Onset)    Dementia Paternal Grandmother    Diabetes Son    Heart disease Mother, Father    Hypertension Sister, Sister, Sister, Sister, Sister    No Known Problems Maternal Grandmother, Maternal Grandfather, Paternal Grandfather, Son    Thyroid disease Daughter          Tobacco Use    Smoking status: Never    Smokeless tobacco: Never   Substance and Sexual Activity    Alcohol use: Not Currently    Drug use: Never    Sexual activity: Not Currently     Review of Systems   Constitutional: Negative.   Objective:     Vital Signs (Most Recent):    Vital Signs (24h Range):  BP: ()/()   Arterial Line BP: ()/()            There is no height or weight on file to calculate BMI.    No intake or output data in the 24 hours ending 02/21/23 0630    General    Vitals reviewed.  Constitutional: He is oriented to person, place, and time. He appears well-developed and well-nourished.   HENT:   Head: Normocephalic and atraumatic.   Eyes: EOM are normal. Pupils are equal, round, and reactive to light.   Neck: Neck supple.   Cardiovascular:  Normal rate, regular rhythm and normal heart sounds.            Pulmonary/Chest: Effort normal and breath sounds normal.   Abdominal: Soft. Bowel sounds are normal.   Neurological: He is alert and oriented to person, place, and time.   Psychiatric: He has a normal mood and affect. His behavior is normal.     General Musculoskeletal Exam   Gait: antalgic       Right Knee Exam     Inspection   Effusion: present    Tenderness   The patient is tender to palpation of the medial joint line.    Range of Motion   Extension:  normal   Flexion:  normal     Tests   Meniscus   Paul:  Medial - positive   Ligament Examination   Lachman: normal (-1 to 2mm)   PCL-Posterior Drawer: normal (0 to 2mm)     MCL - Valgus: normal (0 to 2mm)  LCL  - Varus: normal  Pivot Shift: normal (Equal)    Other   Sensation: normal    Left Knee Exam   Left knee exam is normal.    Vascular Exam     Right Pulses  Dorsalis Pedis:      2+  Posterior Tibial:      2+        Significant Labs: All pertinent labs within the past 24 hours have been reviewed.    Significant Imaging: I have reviewed all pertinent imaging results/findings.    Assessment/Plan:     No notes have been filed under this hospital service.  Service: Orthopedic Surgery      Garrett Shipley MD  Orthopedics  [facility] - Orthopedic Periop Services

## 2023-02-21 NOTE — BRIEF OP NOTE
"[facility] - Orthopedic Periop Services  Brief Operative Note    Surgery Date: 2/21/2023     Surgeon(s) and Role:     * Garrett Shipley MD - Primary    Assisting Surgeon: None    Pre-op Diagnosis:  Internal derangement of left knee [M23.92]    Post-op Diagnosis:  Post-Op Diagnosis Codes:     * Internal derangement of left knee [M23.92]    Procedure(s) (LRB):  ARTHROSCOPY, KNEE (Left)  CARTILAGE-SHAVING (Left)  ARTHROSCOPY, KNEE, WITH MENISCECTOMY (Left)    Anesthesia: general    Description of the findings of the procedure(s): See Op Note     Estimated Blood Loss: * No values recorded between 2/21/2023 11:40 AM and 2/21/2023 12:13 PM *5cc         Specimens:   Specimen (24h ago, onward)      None              Discharge Note    OUTCOME: Patient tolerated treatment/procedure well without complication and is now ready for discharge.    DISPOSITION: Home or Self Care    FINAL DIAGNOSIS:  Acute medial meniscus tear of left knee    FOLLOWUP: In clinic    DISCHARGE INSTRUCTIONS:    Discharge Procedure Orders   WALKER FOR HOME USE     Order Specific Question Answer Comments   Type of Walker: Adult (5'4"-6'6")    With wheels? No    Height: 5' 11" (1.803 m)    Weight: 97.5 kg (215 lb)    Length of need (1-99 months): 2    Please check all that apply: Patient's condition impairs ambulation.      Diet general     Keep surgical extremity elevated     Ice to affected area   Order Comments: using barrier between ice and skin (specify duration&frequency)     Change dressing (specify)   Order Comments: Dressing change: one time per day beginning 72 hours post op.     Call MD for:  temperature >100.4     Call MD for:  persistent nausea and vomiting     Call MD for:  severe uncontrolled pain     Call MD for:  difficulty breathing, headache or visual disturbances     Call MD for:  redness, tenderness, or signs of infection (pain, swelling, redness, odor or green/yellow discharge around incision site)     Call MD for:  hives     Call " MD for:  persistent dizziness or light-headedness     Call MD for:  extreme fatigue     Remove dressing in 48 hours     Activity as tolerated     Shower on day dressing removed (No bath)     Weight bearing as tolerated

## 2023-02-21 NOTE — ANESTHESIA PREPROCEDURE EVALUATION
02/21/2023  Cj Thomas is a 82 y.o., male.      Pre-op Assessment    I have reviewed the Patient Summary Reports.     I have reviewed the Nursing Notes. I have reviewed the NPO Status.   I have reviewed the Medications.     Review of Systems  Anesthesia Hx:  H/o delayed emergence Denies Family Hx of Anesthesia complications.  Personal Hx of Anesthesia complications Slow To Awaken/Delayed Emergence and mild, somewhat sensitive to sedation / narcotics   Social:  Non-Smoker, No Alcohol Use    Cardiovascular:   Exercise tolerance: good Hypertension CAD  CABG/stent  Angina hyperlipidemia ECG has been reviewed. H/o CAD - cardiac cath performed in November 22 with placement of LENA, patient has been on DAPT since then, off plavix x 7 days    Seen by his cardiologist, Dr. Zapata, on 2/3/23 for preop risk stratification and considered mildly increased risk for cardiac events for this procedure   Hepatic/GI:   GERD    Musculoskeletal:   Arthritis     Neurological:   CVA, residual symptoms        Physical Exam  General: Well nourished, Cooperative and Alert    Airway:  Mallampati: II   Mouth Opening: Normal  TM Distance: Normal  Tongue: Normal  Neck ROM: Normal ROM    Dental:  Dentures    Chest/Lungs:  Clear to auscultation, Normal Respiratory Rate    Heart:  Rate: Normal  Rhythm: Regular Rhythm        Chemistry        Component Value Date/Time     (L) 11/09/2022 1625    K 4.0 11/09/2022 1625     11/09/2022 1625    CO2 30 11/09/2022 1625    BUN 10 11/09/2022 1625    CREATININE 0.98 11/09/2022 1625     11/09/2022 1625        Component Value Date/Time    CALCIUM 9.0 11/09/2022 1625    ALKPHOS 83 04/18/2022 0918    AST 12 (L) 04/18/2022 0918    ALT 24 10/25/2022 0832    BILITOT 0.6 04/18/2022 0918    EGFRNONAA 86 04/18/2022 0918        Lab Results   Component Value Date    WBC 8.08 11/09/2022     HGB 15.6 11/09/2022    HCT 45.0 11/09/2022     11/09/2022     Results for orders placed or performed in visit on 12/27/22   EKG 12-lead    Collection Time: 12/27/22  1:32 PM    Narrative    Test Reason : I25.10,I25.83,    Vent. Rate : 065 BPM     Atrial Rate : 065 BPM     P-R Int : 192 ms          QRS Dur : 080 ms      QT Int : 414 ms       P-R-T Axes : 000 022 -66 degrees     QTc Int : 430 ms    Sinus rhythm with Premature supraventricular complexes  T wave abnormality, consider anterolateral ischemia  Abnormal ECG  When compared with ECG of 24-OCT-2022 12:39,  Inverted T waves have replaced nonspecific T wave abnormality in Anterior  leads  Confirmed by Roby Zapata MD ([id number]) on 12/28/2022 1:24:16 PM    Referred By:             Confirmed By:Roby Zapata MD     TTE 10/28/22  Summary     The left ventricle is normal in size with concentric remodeling and low normal systolic function.   The estimated ejection fraction is 50%.   Left ventricular diastolic dysfunction.   Normal right ventricular size with normal right ventricular systolic function.   Mild mitral regurgitation.   Mild tricuspid regurgitation.   Mild pulmonic regurgitation.   There are segmental left ventricular wall motion abnormalities.    [facility] 11/15/22  Summary         The RPDA lesion was 50% stenosed.    The Mid LAD lesion was 100% stenosed.    The Prox RCA lesion was 70% stenosed with 0% stenosis post-intervention.    The Mid RCA lesion was 90% stenosed with 0% stenosis post-intervention.    The Ramus lesion was 75% stenosed.    The Prox Cx to Mid Cx lesion was 100% stenosed.    The ejection fraction was calculated to be 55%.    The left ventricular systolic function was normal.    The left ventricular end diastolic pressure was elevated.    The pre-procedure left ventricular end diastolic pressure was 23.    A  at 20 MAUREEN for 14 sec.    The estimated blood loss was none.    There was three vessel coronary artery  disease.    There was no mitral valve regurgitation.        Anesthesia Plan  Type of Anesthesia, risks & benefits discussed:    Anesthesia Type: Gen Supraglottic Airway  Intra-op Monitoring Plan: Standard ASA Monitors  Post Op Pain Control Plan: multimodal analgesia  Induction:  IV  Airway Plan: Direct, Post-Induction  Informed Consent: Informed consent signed with the Patient and all parties understand the risks and agree with anesthesia plan.  All questions answered.   ASA Score: 3  Day of Surgery Review of History & Physical: H&P Update referred to the surgeon/provider.I have interviewed and examined the patient. I have reviewed the patient's H&P dated: There are no significant changes.     Ready For Surgery From Anesthesia Perspective.     .

## 2023-02-21 NOTE — HPI
Chief complaint:  Left knee pain  History:82-year-old male patient presents orthopedic clinic update his chart.  He is known to orthopedic clinic being followed for left knee pain.  He had MRI performed on his left knee on 10/17/2022 which showed complex tear of the medial meniscus with extrusion.  He was also noted have medial tibial plateau edema suggestive of micro stress fracture.  He was scheduled for knee arthroscopy.  Prior to his scheduled surgery he was seen by his primary care provider.  He had complaint of indigestion.  He was sent to cardiology subsequently had left heart catheterization was found to have coronary artery disease requiring stent placement.  His cardiologist has cleared him to proceed with surgery 3 months after having stent placement.  He will be allowed to discontinue his Plavix for 7-10 days prior to surgery.  Cardiology recommends continue beta-blocker during the procedure.  X-rays left knee three views 09/29/2022 shows the bones well mineralized.  There is minimal joint space narrowing.  No evidence of acute fracture, dislocation or pathologic bone.

## 2023-02-21 NOTE — SUBJECTIVE & OBJECTIVE
"Past Medical History:   Diagnosis Date    Abnormal echocardiogram 11/10/2022    Anticoagulant long-term use     Benign prostate hyperplasia     Coronary artery disease involving native coronary artery of native heart with angina pectoris     COVID-19 02/10/2022    GERD (gastroesophageal reflux disease)     Hyperlipidemia     Hypertension     Osteoarthritis     Stroke        Past Surgical History:   Procedure Laterality Date    LEFT HEART CATHETERIZATION Right 11/15/2022    Procedure: Left heart cath;  Surgeon: Roby Zapata MD;  Location: [facility] CATH LAB;  Service: Cardiology;  Laterality: Right;  right wrist    TONSILLECTOMY      TOTAL KNEE ARTHROPLASTY Right        Review of patient's allergies indicates:   Allergen Reactions    Niacin Swelling     "Not sure"    Demerol [meperidine] Other (See Comments)     "Forgets everything"    Iodine and iodide containing products     Statins-hmg-coa reductase inhibitors      Abnormal behavior. Body aches    Iodinated contrast media Rash and Other (See Comments)     Passes out after       Current Facility-Administered Medications   Medication    sodium hyaluronate (supartz) injection 20 mg     Current Outpatient Medications   Medication Sig    amLODIPine (NORVASC) 5 MG tablet Take 1 tablet (5 mg total) by mouth once daily.    aspirin (ECOTRIN) 81 MG EC tablet Take 81 mg by mouth once daily.    clopidogreL (PLAVIX) 75 mg tablet Take 1 tablet (75 mg total) by mouth once daily.    diclofenac sodium (VOLTAREN) 1 % Gel Apply 2 g topically 4 (four) times daily as needed.    losartan-hydrochlorothiazide 100-12.5 mg (HYZAAR) 100-12.5 mg Tab Take 1/2 tablet by mouth daily for HTN.    metoprolol succinate (TOPROL-XL) 25 MG 24 hr tablet Take 1 tablet (25 mg total) by mouth once daily.    nitroGLYCERIN (NITROSTAT) 0.4 MG SL tablet Place 1 tablet (0.4 mg total) under the tongue every 5 (five) minutes as needed for Chest pain.    omega-3 fatty acids/fish oil (FISH OIL-OMEGA-3 FATTY " ACIDS) 300-1,000 mg capsule Take 2 capsules by mouth 2 (two) times a day.     pantoprazole (PROTONIX) 40 MG tablet Take 1 tablet (40 mg total) by mouth once daily.    rosuvastatin (CRESTOR) 10 MG tablet Take 1 tablet (10 mg total) by mouth once daily.    tamsulosin (FLOMAX) 0.4 mg Cap Take 1 capsule (0.4 mg total) by mouth once daily.    traMADoL (ULTRAM) 50 mg tablet Take 1 tablet (50 mg total) by mouth every 6 (six) hours as needed for Pain.    triamcinolone acetonide 0.1% (KENALOG) 0.1 % ointment Apply topically 2 (two) times daily.     Family History       Problem Relation (Age of Onset)    Dementia Paternal Grandmother    Diabetes Son    Heart disease Mother, Father    Hypertension Sister, Sister, Sister, Sister, Sister    No Known Problems Maternal Grandmother, Maternal Grandfather, Paternal Grandfather, Son    Thyroid disease Daughter          Tobacco Use    Smoking status: Never    Smokeless tobacco: Never   Substance and Sexual Activity    Alcohol use: Not Currently    Drug use: Never    Sexual activity: Not Currently     Review of Systems   Constitutional: Negative.   Objective:     Vital Signs (Most Recent):    Vital Signs (24h Range):  BP: ()/()   Arterial Line BP: ()/()            There is no height or weight on file to calculate BMI.    No intake or output data in the 24 hours ending 02/21/23 0630    General    Vitals reviewed.  Constitutional: He is oriented to person, place, and time. He appears well-developed and well-nourished.   HENT:   Head: Normocephalic and atraumatic.   Eyes: EOM are normal. Pupils are equal, round, and reactive to light.   Neck: Neck supple.   Cardiovascular:  Normal rate, regular rhythm and normal heart sounds.            Pulmonary/Chest: Effort normal and breath sounds normal.   Abdominal: Soft. Bowel sounds are normal.   Neurological: He is alert and oriented to person, place, and time.   Psychiatric: He has a normal mood and affect. His behavior is normal.     General  Musculoskeletal Exam   Gait: antalgic       Right Knee Exam     Inspection   Effusion: present    Tenderness   The patient is tender to palpation of the medial joint line.    Range of Motion   Extension:  normal   Flexion:  normal     Tests   Meniscus   Paul:  Medial - positive   Ligament Examination   Lachman: normal (-1 to 2mm)   PCL-Posterior Drawer: normal (0 to 2mm)     MCL - Valgus: normal (0 to 2mm)  LCL - Varus: normal  Pivot Shift: normal (Equal)    Other   Sensation: normal    Left Knee Exam   Left knee exam is normal.    Vascular Exam     Right Pulses  Dorsalis Pedis:      2+  Posterior Tibial:      2+        Significant Labs: All pertinent labs within the past 24 hours have been reviewed.    Significant Imaging: I have reviewed all pertinent imaging results/findings.

## 2023-02-21 NOTE — PT/OT/SLP EVAL
Physical Therapy Evaluation    Patient Name:  Cj Thomas   MRN:  [id number]    Recommendations:     Discharge Recommendations: home   Discharge Equipment Recommendations: crutches, axillary   Barriers to discharge: none    Assessment:     Cj Thomas is a 82 y.o. male admitted with a medical diagnosis of Acute medial meniscus tear of left knee.  He presents with the following impairments/functional limitations: decreased ROM, gait instability Patient with good understanding of post op education.    Rehab Prognosis: Good; patient would benefit from acute skilled PT services to address these deficits and reach maximum level of function.    Recent Surgery: Procedure(s) (LRB):  ARTHROSCOPY, KNEE (Left)  CARTILAGE-SHAVING (Left)  ARTHROSCOPY, KNEE, WITH MENISCECTOMY (Left) Day of Surgery    Plan:     During this hospitalization, patient to be seen 1 x/week to address the identified rehab impairments via gait training, therapeutic activities and progress toward the following goals:    Plan of Care Expires:       Subjective     Chief Complaint: post op pain  Patient/Family Comments/goals: Plans on dc home today  Pain/Comfort:  Pain Rating 1: 4/10  Location - Side 1: Left  Location 1: knee  Pain Addressed 1: Cessation of Activity    Patients cultural, spiritual, Rastafari conflicts given the current situation: no    Living Environment:  Lives with spouse  Prior to admission, patients level of function was independent.  Equipment used at home: none.  DME owned (not currently used): rolling walker.  Upon discharge, patient will have assistance from spouse.    Objective:     Communicated with nurse prior to session.  Patient found supine with    upon PT entry to room.    General Precautions: Standard,    Orthopedic Precautions:    Braces:    Respiratory Status: Room air    Exams:  na    Functional Mobility:  Gait: ambulated 20 feet with crutches pwb      AM-PAC 6 CLICK MOBILITY  Total Score:18       Treatment &  Education:  HEP: ascope protocol    Patient left supine with call button in reach.    GOALS:   Multidisciplinary Problems       Physical Therapy Goals       Not on file              Multidisciplinary Problems (Resolved)          Problem: Physical Therapy    Goal Priority Disciplines Outcome Goal Variances Interventions   Physical Therapy Goal   (Resolved)     PT, PT/OT Met     Description: Short Term Goals  Independent with HEP  Independent with crutchesx 10 feet TDWB: left lower extremity    Long term goals  Needed equipment for home.                            History:     Past Medical History:   Diagnosis Date    Abnormal echocardiogram 11/10/2022    Anticoagulant long-term use     Benign prostate hyperplasia     Coronary artery disease involving native coronary artery of native heart with angina pectoris     COVID-19 02/10/2022    GERD (gastroesophageal reflux disease)     Hyperlipidemia     Hypertension     Osteoarthritis     Stroke        Past Surgical History:   Procedure Laterality Date    LEFT HEART CATHETERIZATION Right 11/15/2022    Procedure: Left heart cath;  Surgeon: Roby Zapata MD;  Location: [facility] CATH LAB;  Service: Cardiology;  Laterality: Right;  right wrist    TONSILLECTOMY      TOTAL KNEE ARTHROPLASTY Right        Time Tracking:     PT Received On: 02/21/23  PT Start Time: 1410     PT Stop Time: 1430  PT Total Time (min): 20 min     Billable Minutes: Evaluation 20      02/21/2023

## 2023-02-21 NOTE — OP NOTE
[facility] - Orthopedic Periop Services  Surgery Department  Operative Note    SUMMARY     Date of Procedure: 2/21/2023     Procedure: Procedure(s) (LRB):  ARTHROSCOPY, KNEE (Left)  CARTILAGE-SHAVING (Left)  ARTHROSCOPY, KNEE, WITH MENISCECTOMY (Left)     Surgeon(s) and Role:     * Garrett Shipley MD - Primary    Assisting Surgeon: None    Pre-Operative Diagnosis: Internal derangement of left knee [M23.92]    Post-Operative Diagnosis: Post-Op Diagnosis Codes:     * Internal derangement of left knee [M23.92]    Anesthesia: general    Technical Procedures Used:            DEPARTMENT OF ORTHOPEDIC SURGERY                OPERATIVE REPORT     NAME:  Cj Thomas  MRN: [id number]               DATE OF SURGERY:  02/21/2023     PREOPERATIVE DIAGNOSIS:  left knee internal derangement    POSTOPERATIVE DIAGNOSIS:  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    PROCEDURE:  Examination under anesthesia, arthroscopy with intraarticular probing, shaving patella, shaving medial femoral condyle, partial medial and lateral meniscectomy-left knee    PROCEDURE IN DETAIL:  The patient was taken to the operating room and placed in the supine position.  After adequate level of general anesthesia had been achieved (See Anesthesia Note) patients left knee was examined.  There was 1+ intraarticular effusion.  Knee range of motion was 0-120 degrees of flexion.  Lachman exam was negative as is the FRD.  There is no medial or ligamentous instability appreciated.  Paul test produced intermittent popping felt to emanate from the lateral aspect of the joint.  Now, the leftlower extremity was scrubbed with Betadine and draped in sterile fashion.  The left lower extremity was elevated, exsanguinated with a Richard bandage.  A pneumatic tourniquet about the  upper thigh, to pressure of 300 millimeters of Mercury.  The operation was begun by making a small stab wound about the superolateral aspect of the right patella.  A trocar was introduced into the suprapatellar pouch and the knee was distended with sterile saline.  Second and third stab wounds were made about the medial and lateral aspects of the patellar tendon for introduction of the arthroscopy camera and intraarticular probe.  Now a systematic evaluation of the knee was carried out.  Inspection of suprapatellar pouch was unremarkable.  Exam is the lateral gutter was unremarkable with no loose bodies encountered.  Exam of the patellofemoral joint revealed grade 2/4 DJD involving the patellar articular surface.  This area was lightly debrided with shaver.  Medial joint revealed grade 3-4/4 DJD involving weight-bearing articular surface of the medial femoral condyle and medial tibial plateau.  Now partial medial meniscectomy was performed removing the posterior horn using biting forceps and sculpting with the radiofrequency Wand.  Examination intercondylar notch revealed intact anterior and posterior cruciate ligaments.  Exam of the lateral joint showed minimal degenerative changes.  There was chalky impregnation in the lateral meniscus with fraying involving the posterior horn and midbody region as well as some areas of the anterior horn.  These areas were debrided with shaver and contoured with the radiofrequency Wand.  Now, the tourniquet was let down.  Hemostasis was achieved with Bovie electrocautery.  Knee joint was irrigated copiously with antibiotic solution and arthroscope withdrawn.  The stab wounds were reapproximated with interrupted 4-0 suture.  The wounds were dressed sterilely.  The patient was taken to the recovery room in satisfactory condition.  ESTIMATED BLOOD LOSS:  5ccs.   Tourniquet time: 20 minutes.  The patient received Ancef antibiotic intravenously prior to the procedure.      Garrett BONDS  Alonaki           Complications: No    Estimated Blood Loss (EBL): * No values recorded between 2/21/2023 11:40 AM and 2/21/2023 12:13 PM *           Implants: * No implants in log *    Specimens:   Specimen (24h ago, onward)      None                    Condition: Good    Disposition: PACU - hemodynamically stable.    Attestation: I was present and scrubbed for the entire procedure.

## 2023-02-22 NOTE — ANESTHESIA POSTPROCEDURE EVALUATION
Anesthesia Post Evaluation    Patient: jC Thomas    Procedure(s) Performed: Procedure(s) (LRB):  ARTHROSCOPY, KNEE (Left)  CARTILAGE-SHAVING (Left)  ARTHROSCOPY, KNEE, WITH MENISCECTOMY (Left)    Final Anesthesia Type: general      Patient location during evaluation: PACU  Patient participation: Yes- Able to Participate  Level of consciousness: awake and alert and oriented  Post-procedure vital signs: reviewed and stable  Pain management: adequate  Airway patency: patent  NAYAN mitigation strategies: Multimodal analgesia  PONV status at discharge: No PONV  Anesthetic complications: no      Cardiovascular status: hemodynamically stable  Respiratory status: unassisted and spontaneous ventilation  Hydration status: euvolemic  Follow-up not needed.          Vitals Value Taken Time   /83 02/21/23 1255   Temp 36.4 °C (97.6 °F) 02/21/23 1217   Pulse 69 02/21/23 1255   Resp 18 02/21/23 1255   SpO2 96 % 02/21/23 1255         Event Time   Out of Recovery 12:50:00         Pain/Juan Pablo Score: Juan Pablo Score: 10 (2/21/2023  1:07 PM)  Modified Juan Pablo Score: 19 (2/21/2023  2:21 PM)

## 2023-02-28 ENCOUNTER — OFFICE VISIT (OUTPATIENT)
Dept: ORTHOPEDICS | Facility: CLINIC | Age: 83
End: 2023-02-28
Payer: MEDICARE

## 2023-02-28 DIAGNOSIS — Z98.890 STATUS POST ARTHROSCOPY OF LEFT KNEE: Primary | ICD-10-CM

## 2023-02-28 PROCEDURE — 1159F MED LIST DOCD IN RCRD: CPT | Mod: CPTII,,, | Performed by: NURSE PRACTITIONER

## 2023-02-28 PROCEDURE — 1160F RVW MEDS BY RX/DR IN RCRD: CPT | Mod: CPTII,,, | Performed by: NURSE PRACTITIONER

## 2023-02-28 PROCEDURE — 99024 POSTOP FOLLOW-UP VISIT: CPT | Mod: ,,, | Performed by: NURSE PRACTITIONER

## 2023-02-28 PROCEDURE — 1160F PR REVIEW ALL MEDS BY PRESCRIBER/CLIN PHARMACIST DOCUMENTED: ICD-10-PCS | Mod: CPTII,,, | Performed by: NURSE PRACTITIONER

## 2023-02-28 PROCEDURE — 99213 OFFICE O/P EST LOW 20 MIN: CPT | Mod: PBBFAC | Performed by: NURSE PRACTITIONER

## 2023-02-28 PROCEDURE — 99024 PR POST-OP FOLLOW-UP VISIT: ICD-10-PCS | Mod: ,,, | Performed by: NURSE PRACTITIONER

## 2023-02-28 PROCEDURE — 1159F PR MEDICATION LIST DOCUMENTED IN MEDICAL RECORD: ICD-10-PCS | Mod: CPTII,,, | Performed by: NURSE PRACTITIONER

## 2023-02-28 NOTE — PATIENT INSTRUCTIONS
Safety guidelines and activity restrictions are discussed with the patient.  Verbalized understanding.  Discussed arthrocentesis.  States he feels he is not in need of that at this time.  Sutures removed Steri-Strips applied.  Return to orthopedic clinic in 3 weeks follow-up re-evaluation of left knee or sooner as needed.

## 2023-02-28 NOTE — PROGRESS NOTES
82-year-old male patient presents ambulatory for re-evaluation of his left knee.  She is known to orthopedic clinic having undergone left knee arthroscopy on 02/21/2023 with partial mediolateral meniscectomy with shaving of the patella and medial femoral condyle.  He was noted during the procedure to have grade 2/4 DJD of the patellofemoral joint with grade 3 to 4/4 DJD of the medial compartment.  He had complex degenerative tear of the posterior horn of the medial meniscus with degenerative tearing of the free edge posterior horn and midbody region.  He reports complete resolution of pain symptoms.    PE: He is noted be ambulating independently no perceptible limp.  Physical exam left knee skin is warm and dry.  Portal sites are healing well without sign or symptom of infection.  There is a single suture missing from the lateral proximal tibial portal site.  Range of motion of the left knee is 0° extension with further flexion to approximately 120°.  There was excellent ligamentous balance instability noted clinically.  There is a 2+ intra-articular effusion appreciated clinically.      Impression:  1 week following right knee arthroscopy    Plan:  Safety guidelines and activity restrictions are discussed with the patient.  Verbalized understanding.  Discussed arthrocentesis.  States he feels he is not in need of that at this time.  Sutures removed Steri-Strips applied.  Return to orthopedic clinic in 3 weeks follow-up re-evaluation of left knee or sooner as needed.

## 2023-03-03 DIAGNOSIS — I10 ESSENTIAL HYPERTENSION: ICD-10-CM

## 2023-03-03 NOTE — TELEPHONE ENCOUNTER
----- Message from Jose E Smith sent at 3/3/2023 11:11 AM CST -----  Amlodipine 5mg to [facility] pt num [phone]

## 2023-03-05 RX ORDER — AMLODIPINE BESYLATE 5 MG/1
5 TABLET ORAL DAILY
Qty: 90 TABLET | Refills: 1 | Status: SHIPPED | OUTPATIENT
Start: 2023-03-05 | End: 2023-09-15 | Stop reason: SDUPTHER

## 2023-03-21 ENCOUNTER — TELEPHONE (OUTPATIENT)
Dept: FAMILY MEDICINE | Facility: CLINIC | Age: 83
End: 2023-03-21
Payer: MEDICARE

## 2023-03-21 DIAGNOSIS — K59.04 CHRONIC IDIOPATHIC CONSTIPATION: Primary | ICD-10-CM

## 2023-03-21 DIAGNOSIS — Z79.899 ENCOUNTER FOR LONG-TERM (CURRENT) USE OF OTHER MEDICATIONS: ICD-10-CM

## 2023-03-21 DIAGNOSIS — E78.2 MIXED HYPERLIPIDEMIA: Primary | Chronic | ICD-10-CM

## 2023-03-21 NOTE — TELEPHONE ENCOUNTER
Client questioned about medication dosage, client says ' he has not been prescribed this medication yet so is unsure' and states he ' will try to find some other OTC alternative.'

## 2023-03-21 NOTE — TELEPHONE ENCOUNTER
Oh, so he is wanting to try the medication.  I will send 145 mcg daily please advise to take once every morning on an empty stomach 30 minutes to an hour before eating and do not remove the desiccant pack from the bottle of medication.  Assuming [employer] will cover it.

## 2023-03-21 NOTE — TELEPHONE ENCOUNTER
----- Message from Jose E Smith sent at 3/21/2023  1:37 PM CDT -----  NEEDS LINZESS TO [facility] PT [phone]

## 2023-03-21 NOTE — TELEPHONE ENCOUNTER
Linzess isn't on his med list anymore as of now or in history.  What mcg 145 or 290? And you can let him know that I will get it sent as soon as I hear back from you with dose.

## 2023-03-28 ENCOUNTER — OFFICE VISIT (OUTPATIENT)
Dept: ORTHOPEDICS | Facility: CLINIC | Age: 83
End: 2023-03-28
Payer: MEDICARE

## 2023-03-28 DIAGNOSIS — Z98.890 STATUS POST ARTHROSCOPY OF LEFT KNEE: Primary | ICD-10-CM

## 2023-03-28 PROCEDURE — 99024 POSTOP FOLLOW-UP VISIT: CPT | Mod: ,,, | Performed by: NURSE PRACTITIONER

## 2023-03-28 PROCEDURE — 1160F RVW MEDS BY RX/DR IN RCRD: CPT | Mod: CPTII,,, | Performed by: NURSE PRACTITIONER

## 2023-03-28 PROCEDURE — 1160F PR REVIEW ALL MEDS BY PRESCRIBER/CLIN PHARMACIST DOCUMENTED: ICD-10-PCS | Mod: CPTII,,, | Performed by: NURSE PRACTITIONER

## 2023-03-28 PROCEDURE — 1159F MED LIST DOCD IN RCRD: CPT | Mod: CPTII,,, | Performed by: NURSE PRACTITIONER

## 2023-03-28 PROCEDURE — 99213 OFFICE O/P EST LOW 20 MIN: CPT | Mod: PBBFAC | Performed by: NURSE PRACTITIONER

## 2023-03-28 PROCEDURE — 99024 PR POST-OP FOLLOW-UP VISIT: ICD-10-PCS | Mod: ,,, | Performed by: NURSE PRACTITIONER

## 2023-03-28 PROCEDURE — 1159F PR MEDICATION LIST DOCUMENTED IN MEDICAL RECORD: ICD-10-PCS | Mod: CPTII,,, | Performed by: NURSE PRACTITIONER

## 2023-03-28 NOTE — PATIENT INSTRUCTIONS
Safety guidelines activity restrictions discussed with patient.  Verbalized understanding.  Discussed the use of oral NSAIDs, topical NSAIDs, intra-articular steroid injection, viscosupplementation ultimately total knee replacement arthroplasty.  He is elected to stick with the NSAID medications only.  Discussed use of a knee sleeve as well.  He is offered arthrocentesis but declines.  Should he decide he would like arthrocentesis performed he will call back for scheduling.

## 2023-03-28 NOTE — PROGRESS NOTES
82-year-old male patient presents for re-evaluation left knee.  He is known to orthopedic clinic having undergone left knee arthroscopy on 02/21/2023.  He continues to have pain and discomfort in his knee.  States he has a perception of grinding in his knee.  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  He states he does treated himself with over-the-counter ibuprofen.  He takes 1 in the morning and 1 in the evening as needed.  He was not interested in other medications time.  Denies injury trauma.      PE: He is noted be ambulating with antalgic limp on the left.  Physical exam left knee skin is warm and dry.  Portal sites well healed without sign or symptom of infection.  Range of motion left knee 0° extension further flexion approximately 125°.  There was excellent ligamentous balance instability noted clinically.  No soft tissue swelling noted.  There is a 2+ intra-articular effusion appreciated.      Impression:  History of left knee arthroscopy 02/21/2023     Plan:  Safety guidelines activity restrictions discussed with patient.  Verbalized understanding.  Discussed the use of oral NSAIDs, topical NSAIDs, intra-articular steroid injection, viscosupplementation ultimately total knee replacement arthroplasty.  He is elected to stick with the NSAID medications only.  Discussed use of a knee sleeve as well.  He is offered arthrocentesis but declines.  Should he decide he would like arthrocentesis performed he will call back for scheduling.

## 2023-05-09 DIAGNOSIS — Z71.89 COMPLEX CARE COORDINATION: ICD-10-CM

## 2023-06-08 ENCOUNTER — TELEPHONE (OUTPATIENT)
Dept: CARDIOLOGY | Facility: CLINIC | Age: 83
End: 2023-06-08
Payer: MEDICARE

## 2023-06-08 NOTE — TELEPHONE ENCOUNTER
"6/7/23--wife called to report that pt has had 3 episodes of chest tightness, jaw pain, and left arm pain.  Has not taken any NTG during the episodes.  Had stopped Crestor and rash did not go away so they restarted it.   Stopped "heart rate" medications on 5/7/23 and rash went away.  Dr. Zapata reviewed above and recommends pt try NTG when he has these episode.  Instructed wife that if the episodes become more frequent, he has to start taking NTG frequently, or the NTG does not work,  then he will need to come in.  Wife voiced understanding.   "

## 2023-06-12 ENCOUNTER — TELEPHONE (OUTPATIENT)
Dept: FAMILY MEDICINE | Facility: CLINIC | Age: 83
End: 2023-06-12
Payer: MEDICARE

## 2023-06-12 DIAGNOSIS — I10 ESSENTIAL HYPERTENSION: ICD-10-CM

## 2023-06-12 RX ORDER — LOSARTAN POTASSIUM AND HYDROCHLOROTHIAZIDE 12.5; 1 MG/1; MG/1
TABLET ORAL
Qty: 45 TABLET | Refills: 1 | Status: SHIPPED | OUTPATIENT
Start: 2023-06-12 | End: 2023-12-15 | Stop reason: SDUPTHER

## 2023-06-12 NOTE — TELEPHONE ENCOUNTER
----- Message from Gracy Garcia sent at 6/12/2023  9:43 AM CDT -----  Pt needs refill on losartan-hydrochlorothiazide  sent to  [facility] Pt # [phone]

## 2023-07-28 ENCOUNTER — HOSPITAL ENCOUNTER (OUTPATIENT)
Dept: RADIOLOGY | Facility: HOSPITAL | Age: 83
Discharge: HOME | End: 2023-07-28
Attending: NURSE PRACTITIONER
Payer: MEDICARE

## 2023-07-28 ENCOUNTER — OFFICE VISIT (OUTPATIENT)
Dept: ORTHOPEDICS | Facility: CLINIC | Age: 83
End: 2023-07-28
Payer: MEDICARE

## 2023-07-28 DIAGNOSIS — M54.12 CERVICAL NEURALGIA: ICD-10-CM

## 2023-07-28 DIAGNOSIS — M54.2 NECK PAIN: Primary | ICD-10-CM

## 2023-07-28 DIAGNOSIS — M25.512 LEFT SHOULDER PAIN, UNSPECIFIED CHRONICITY: Primary | ICD-10-CM

## 2023-07-28 DIAGNOSIS — M16.12 PRIMARY OSTEOARTHRITIS OF LEFT HIP: ICD-10-CM

## 2023-07-28 DIAGNOSIS — M25.552 LEFT HIP PAIN: ICD-10-CM

## 2023-07-28 DIAGNOSIS — M25.812 IMPINGEMENT OF LEFT SHOULDER: ICD-10-CM

## 2023-07-28 DIAGNOSIS — M25.511 CHRONIC PAIN OF BOTH SHOULDERS: ICD-10-CM

## 2023-07-28 DIAGNOSIS — G89.29 CHRONIC PAIN OF BOTH SHOULDERS: ICD-10-CM

## 2023-07-28 DIAGNOSIS — M25.512 CHRONIC PAIN OF BOTH SHOULDERS: ICD-10-CM

## 2023-07-28 DIAGNOSIS — M54.2 NECK PAIN: ICD-10-CM

## 2023-07-28 PROCEDURE — 72050 X-RAY EXAM NECK SPINE 4/5VWS: CPT | Mod: 26,,, | Performed by: RADIOLOGY

## 2023-07-28 PROCEDURE — 99214 OFFICE O/P EST MOD 30 MIN: CPT | Mod: S$PBB,,, | Performed by: NURSE PRACTITIONER

## 2023-07-28 PROCEDURE — 73502 X-RAY EXAM HIP UNI 2-3 VIEWS: CPT | Mod: 26,LT,, | Performed by: RADIOLOGY

## 2023-07-28 PROCEDURE — 73502 XR HIP WITH PELVIS WHEN PERFORMED, 2 OR 3 VIEWS LEFT: ICD-10-PCS | Mod: 26,LT,, | Performed by: RADIOLOGY

## 2023-07-28 PROCEDURE — 99213 OFFICE O/P EST LOW 20 MIN: CPT | Mod: PBBFAC | Performed by: NURSE PRACTITIONER

## 2023-07-28 PROCEDURE — 72050 XR CERVICAL SPINE COMPLETE 5 VIEW: ICD-10-PCS | Mod: 26,,, | Performed by: RADIOLOGY

## 2023-07-28 PROCEDURE — 73030 X-RAY EXAM OF SHOULDER: CPT | Mod: TC,50

## 2023-07-28 PROCEDURE — 73030 XR SHOULDER COMPLETE 2 OR MORE VIEWS BILATERAL: ICD-10-PCS | Mod: 26,50,, | Performed by: RADIOLOGY

## 2023-07-28 PROCEDURE — 73030 X-RAY EXAM OF SHOULDER: CPT | Mod: 26,50,, | Performed by: RADIOLOGY

## 2023-07-28 PROCEDURE — 99214 PR OFFICE/OUTPT VISIT, EST, LEVL IV, 30-39 MIN: ICD-10-PCS | Mod: S$PBB,,, | Performed by: NURSE PRACTITIONER

## 2023-07-28 PROCEDURE — 73502 X-RAY EXAM HIP UNI 2-3 VIEWS: CPT | Mod: TC,LT

## 2023-07-28 PROCEDURE — 72050 X-RAY EXAM NECK SPINE 4/5VWS: CPT | Mod: TC

## 2023-07-28 NOTE — PROGRESS NOTES
82-year-old male patient presents ambulatory to orthopedic clinic complaint of pain in his cervical spine that has been ongoing for a couple of months now.  He also complains of pain in his bilateral shoulders.  Pain in the right shoulder is in the scapular region.  Pain in his left shoulder is anterior and lateral pain.  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  Device slipped and was fallen to the floor.  He agrees non dominant left arm to keep it from hitting the floor and jerked his arm and has had pain since that time.  He does use ibuprofen over-the-counter does not use but 1 pill a day as needed.  He does not use acetaminophen at this time.      X-ray: X-rays today of the cervical spine five view shows severe DJD changes in cervical spine most severely affecting C5-C6 area.  No evidence acute fracture, dislocation pathologic bone noted.     X-ray: X-rays today left hip AP lateral view shows DJD changes.  Joint space is fairly well-maintained.  No evidence acute fracture, dislocation or pathologic bone noted.      X-ray: X-rays today of bilateral shoulders AP, lateral and Y scapular view shows a right shoulder with mild DJD changes.  There is a small osteophyte emanating from the acromion process.  X-ray of the left shoulder the humeral joint is located however, the humeral head appears to be riding high.  No evidence acute fracture, dislocation pathologic bone noted.      PE:  Physical exam right shoulder shoulder has normal contour.  Passive range of motion right shoulder is 90° abduction.  Oneal test is negative.  Neer test is negative.  Empty can test is negative.  Active range motion right shoulder is 90° abduction independent scapulothoracic motion.  Able to externally rotate placed hand base neck.  Able to internally rotate small of his back.  Physical exam of his left shoulder shoulder has normal contour.  Oneal-Kenneth test reproduces pain  symptoms.  Pain symptoms reproduced with abduction passively at 90°.  Empty can test reproduces pain symptoms with both pain and weakness.  Neer test is positive at 180°.  Bilateral radial pulse is 2 out of 4.  Capillary refill all digits of both upper extremities less than 3 seconds.  Physical exam of his left hip hip has good motion in all planes without elicitation of pain.  Unable to reproduce pain in clinic.    Impression:  1.) suspect rotator cuff tear in left shoulder 2.)  Cervical neuralgia 3.)  Left buttocks pain.      Plan:  Safety guidelines and activity restrictions are discussed with the patient.  Verbalized understanding.  We will refer him to pain management for his neck after obtaining MRI examination of his neck.  We will order MRI of his left shoulder.  We will have return orthopedic clinic with me with MRI results of the shoulder.  Advised he may use over-the-counter ibuprofen and acetaminophen every 8 hours not to exceed the max daily dose.  Verbalized understanding.

## 2023-07-28 NOTE — PATIENT INSTRUCTIONS
Safety guidelines and activity restrictions are discussed with the patient.  Verbalized understanding.  We will refer him to pain management for his neck after obtaining MRI examination of his neck.  We will order MRI of his left shoulder.  We will have return orthopedic clinic with me with MRI results of the shoulder.  Advised he may use over-the-counter ibuprofen and acetaminophen every 8 hours not to exceed the max daily dose.  Verbalized understanding.

## 2023-08-01 ENCOUNTER — OFFICE VISIT (OUTPATIENT)
Dept: CARDIOLOGY | Facility: CLINIC | Age: 83
End: 2023-08-01
Payer: MEDICARE

## 2023-08-01 VITALS
SYSTOLIC BLOOD PRESSURE: 128 MMHG | HEIGHT: 71 IN | HEART RATE: 64 BPM | WEIGHT: 211.13 LBS | DIASTOLIC BLOOD PRESSURE: 64 MMHG | BODY MASS INDEX: 29.56 KG/M2 | RESPIRATION RATE: 16 BRPM

## 2023-08-01 DIAGNOSIS — I25.83 CORONARY ARTERY DISEASE DUE TO LIPID RICH PLAQUE: Primary | Chronic | ICD-10-CM

## 2023-08-01 DIAGNOSIS — I25.10 CORONARY ARTERY DISEASE DUE TO LIPID RICH PLAQUE: Primary | Chronic | ICD-10-CM

## 2023-08-01 DIAGNOSIS — I10 ESSENTIAL HYPERTENSION: Chronic | ICD-10-CM

## 2023-08-01 DIAGNOSIS — Z86.73 HISTORY OF CVA (CEREBROVASCULAR ACCIDENT): Chronic | ICD-10-CM

## 2023-08-01 DIAGNOSIS — E78.2 MIXED HYPERLIPIDEMIA: Chronic | ICD-10-CM

## 2023-08-01 PROCEDURE — 93005 ELECTROCARDIOGRAM TRACING: CPT | Mod: PBBFAC | Performed by: INTERNAL MEDICINE

## 2023-08-01 PROCEDURE — 99214 PR OFFICE/OUTPT VISIT, EST, LEVL IV, 30-39 MIN: ICD-10-PCS | Mod: S$PBB,,, | Performed by: INTERNAL MEDICINE

## 2023-08-01 PROCEDURE — 99214 OFFICE O/P EST MOD 30 MIN: CPT | Mod: PBBFAC | Performed by: INTERNAL MEDICINE

## 2023-08-01 PROCEDURE — 99214 OFFICE O/P EST MOD 30 MIN: CPT | Mod: S$PBB,,, | Performed by: INTERNAL MEDICINE

## 2023-08-01 PROCEDURE — 93010 ELECTROCARDIOGRAM REPORT: CPT | Mod: S$PBB,,, | Performed by: INTERNAL MEDICINE

## 2023-08-01 PROCEDURE — 93010 EKG 12-LEAD: ICD-10-PCS | Mod: S$PBB,,, | Performed by: INTERNAL MEDICINE

## 2023-08-04 NOTE — PROGRESS NOTES
PCP: Samantha Araujo FNP    Referring Provider:     Subjective:   Cj Thomas is a 82 y.o. male with hx of CAD, HTN, HLD, and CVA who presents for 6 month follow up.         Fhx:  Family History   Problem Relation Age of Onset    Heart disease Mother     Heart disease Father     Hypertension Sister     Thyroid disease Daughter     Diabetes Son     No Known Problems Maternal Grandmother     No Known Problems Maternal Grandfather     Dementia Paternal Grandmother     No Known Problems Paternal Grandfather     Hypertension Sister     Hypertension Sister     Hypertension Sister     Hypertension Sister     No Known Problems Son      Shx:   Social History     Socioeconomic History    Marital status:     Number of children: 3   Tobacco Use    Smoking status: Never    Smokeless tobacco: Never   Substance and Sexual Activity    Alcohol use: Not Currently    Drug use: Never    Sexual activity: Not Currently       EKG   8/1/23--NSR with sinus arrhythmia, nonspecific T wave abn, 60 bpm  12/27/2022(reviewed with Dr. Zapata) RSR with PAC's HR 65 bpm; anterolateral TWA    ECHO Results for orders placed during the hospital encounter of 10/28/22    Echo    Interpretation Summary  · The left ventricle is normal in size with concentric remodeling and low normal systolic function.  · The estimated ejection fraction is 50%.  · Left ventricular diastolic dysfunction.  · Normal right ventricular size with normal right ventricular systolic function.  · Mild mitral regurgitation.  · Mild tricuspid regurgitation.  · Mild pulmonic regurgitation.  · There are segmental left ventricular wall motion abnormalities.      LEXISCAN:  10/31/22--  1. Very large anterior and septal wall perfusion defect extending to the apex in stress images which partially improves in rest images and is consistent with an area of ischemia and scar.  2. Normal left ventricular size with hypokinesis of the distal 1/2 of the interventricular septum and  overall mildly impaired left ventricular systolic function, ejection fraction 43%.    [facility] Results for orders placed during the hospital encounter of 11/15/22    Cardiac catheterization    Conclusion    The RPDA lesion was 50% stenosed.    The Mid LAD lesion was 100% stenosed.    The Prox RCA lesion was 70% stenosed with 0% stenosis post-intervention.    The Mid RCA lesion was 90% stenosed with 0% stenosis post-intervention.    The Ramus lesion was 75% stenosed.    The Prox Cx to Mid Cx lesion was 100% stenosed.    The ejection fraction was calculated to be 55%.    The left ventricular systolic function was normal.    The left ventricular end diastolic pressure was elevated.    The pre-procedure left ventricular end diastolic pressure was 23.    A  at 20 MAUREEN for 14 sec.    The estimated blood loss was none.    There was three vessel coronary artery disease.    There was no mitral valve regurgitation.    The procedure log was documented by Documenter: Claude Jansen RN and verified by Roby Zapata MD.    Date: 11/15/2022  Time: 10:49 AM        Lab Results   Component Value Date     (L) 11/09/2022    K 4.0 11/09/2022     11/09/2022    CO2 30 11/09/2022    BUN 10 11/09/2022    CREATININE 0.98 11/09/2022    CALCIUM 9.0 11/09/2022    ANIONGAP 8 11/09/2022    EGFRNONAA 86 04/18/2022       Lab Results   Component Value Date    CHOL 124 03/28/2023    CHOL 209 (H) 10/25/2022    CHOL 240 (H) 04/18/2022     Lab Results   Component Value Date    HDL 49 03/28/2023    HDL 45 10/25/2022    HDL 43 04/18/2022     Lab Results   Component Value Date    LDLCALC 53 03/28/2023    LDLCALC 132 10/25/2022    LDLCALC 160 04/18/2022     Lab Results   Component Value Date    TRIG 108 03/28/2023    TRIG 159 (H) 10/25/2022    TRIG 187 (H) 04/18/2022     Lab Results   Component Value Date    CHOLHDL 2.5 03/28/2023    CHOLHDL 4.6 10/25/2022    CHOLHDL 5.6 04/18/2022       Lab Results   Component Value Date    WBC 8.08  11/09/2022    HGB 15.6 11/09/2022    HCT 45.0 11/09/2022    MCV 91.5 11/09/2022     11/09/2022           Current Outpatient Medications:     amLODIPine (NORVASC) 5 MG tablet, Take 1 tablet (5 mg total) by mouth once daily., Disp: 90 tablet, Rfl: 1    aspirin (ECOTRIN) 81 MG EC tablet, Take 81 mg by mouth once daily., Disp: , Rfl:     clopidogreL (PLAVIX) 75 mg tablet, Take 1 tablet (75 mg total) by mouth once daily., Disp: 90 tablet, Rfl: 3    linaCLOtide (LINZESS) 145 mcg Cap capsule, Take 1 capsule (145 mcg total) by mouth before breakfast., Disp: 90 capsule, Rfl: 3    losartan-hydrochlorothiazide 100-12.5 mg (HYZAAR) 100-12.5 mg Tab, Take 1/2 tablet by mouth daily for HTN., Disp: 45 tablet, Rfl: 1    metoprolol succinate (TOPROL-XL) 25 MG 24 hr tablet, Take 1 tablet (25 mg total) by mouth once daily., Disp: 90 tablet, Rfl: 3    nitroGLYCERIN (NITROSTAT) 0.4 MG SL tablet, Place 1 tablet (0.4 mg total) under the tongue every 5 (five) minutes as needed for Chest pain., Disp: 25 tablet, Rfl: 3    omega-3 fatty acids/fish oil (FISH OIL-OMEGA-3 FATTY ACIDS) 300-1,000 mg capsule, Take 2 capsules by mouth 2 (two) times a day. , Disp: , Rfl:     pantoprazole (PROTONIX) 40 MG tablet, Take 1 tablet (40 mg total) by mouth once daily., Disp: 90 tablet, Rfl: 0    rosuvastatin (CRESTOR) 10 MG tablet, Take 1 tablet (10 mg total) by mouth once daily., Disp: 90 tablet, Rfl: 3    tamsulosin (FLOMAX) 0.4 mg Cap, Take 1 capsule (0.4 mg total) by mouth once daily., Disp: 90 capsule, Rfl: 3    traMADoL (ULTRAM) 50 mg tablet, Take 1 tablet (50 mg total) by mouth every 6 (six) hours as needed for Pain., Disp: 30 each, Rfl: 0    triamcinolone acetonide 0.1% (KENALOG) 0.1 % ointment, Apply topically 2 (two) times daily., Disp: 454 g, Rfl: 0    Review of Systems   Respiratory:  Negative for shortness of breath.    Cardiovascular:  Negative for chest pain, palpitations and leg swelling.   Neurological:  Negative for loss of  "consciousness.           Objective:   /64 (BP Location: Right arm, Patient Position: Sitting)   Pulse 64   Resp 16   Ht 5' 11" (1.803 m)   Wt 95.8 kg (211 lb 1.9 oz)   BMI 29.45 kg/m²     Physical Exam  Vitals reviewed.   Constitutional:       Appearance: Normal appearance.   HENT:      Head: Normocephalic and atraumatic.   Neck:      Vascular: No carotid bruit or JVD.   Cardiovascular:      Rate and Rhythm: Normal rate and regular rhythm.      Pulses: Normal pulses.           Radial pulses are 2+ on the right side and 2+ on the left side.      Heart sounds: Normal heart sounds. No murmur heard.  Pulmonary:      Effort: Pulmonary effort is normal.      Breath sounds: Normal breath sounds.   Musculoskeletal:      Right lower leg: No edema.      Left lower leg: No edema.   Skin:     General: Skin is warm and dry.   Neurological:      Mental Status: He is alert and oriented to person, place, and time.           Assessment:     1. Coronary artery disease due to lipid rich plaque  EKG 12-lead    EKG 12-lead      2. Essential hypertension        3. Mixed hyperlipidemia        4. History of CVA (cerebrovascular accident)              Plan:   Continue current medications.   F/u in one year.         "

## 2023-09-15 DIAGNOSIS — I10 ESSENTIAL HYPERTENSION: ICD-10-CM

## 2023-09-15 NOTE — TELEPHONE ENCOUNTER
----- Message from Gracy Garcia sent at 9/15/2023 11:07 AM CDT -----  Pt need refill on amLODIPine  sent to [facility] Pt # [phone]

## 2023-09-17 RX ORDER — AMLODIPINE BESYLATE 5 MG/1
5 TABLET ORAL DAILY
Qty: 90 TABLET | Refills: 0 | Status: SHIPPED | OUTPATIENT
Start: 2023-09-17 | End: 2023-12-15 | Stop reason: SDUPTHER

## 2023-12-09 DIAGNOSIS — Z71.89 COMPLEX CARE COORDINATION: ICD-10-CM

## 2023-12-15 DIAGNOSIS — I10 ESSENTIAL HYPERTENSION: ICD-10-CM

## 2023-12-15 DIAGNOSIS — N40.0 BENIGN PROSTATIC HYPERPLASIA WITHOUT URINARY OBSTRUCTION: Chronic | ICD-10-CM

## 2023-12-15 RX ORDER — TAMSULOSIN HYDROCHLORIDE 0.4 MG/1
1 CAPSULE ORAL DAILY
Qty: 90 CAPSULE | Refills: 0 | Status: SHIPPED | OUTPATIENT
Start: 2023-12-15 | End: 2024-03-22 | Stop reason: SDUPTHER

## 2023-12-15 RX ORDER — LOSARTAN POTASSIUM AND HYDROCHLOROTHIAZIDE 12.5; 1 MG/1; MG/1
TABLET ORAL
Qty: 45 TABLET | Refills: 0 | Status: SHIPPED | OUTPATIENT
Start: 2023-12-15 | End: 2024-01-09 | Stop reason: SDUPTHER

## 2023-12-15 RX ORDER — AMLODIPINE BESYLATE 5 MG/1
5 TABLET ORAL DAILY
Qty: 90 TABLET | Refills: 0 | Status: SHIPPED | OUTPATIENT
Start: 2023-12-15 | End: 2024-01-09 | Stop reason: SDUPTHER

## 2023-12-15 NOTE — TELEPHONE ENCOUNTER
----- Message from Jose E Smith sent at 12/15/2023 10:10 AM CST -----  Tamsulosin, lasartan, amlodipine to [facility]

## 2023-12-15 NOTE — TELEPHONE ENCOUNTER
Msg to  to notify pt prescription/refills have been sent, but needs an appt within a month because last routine f/u 10/20/22; AWV Dec 2022, needs appt asap.

## 2024-01-09 ENCOUNTER — OFFICE VISIT (OUTPATIENT)
Dept: FAMILY MEDICINE | Facility: CLINIC | Age: 84
End: 2024-01-09
Payer: MEDICARE

## 2024-01-09 VITALS
SYSTOLIC BLOOD PRESSURE: 127 MMHG | OXYGEN SATURATION: 95 % | WEIGHT: 215 LBS | DIASTOLIC BLOOD PRESSURE: 62 MMHG | BODY MASS INDEX: 30.1 KG/M2 | HEIGHT: 71 IN | TEMPERATURE: 98 F | HEART RATE: 63 BPM | RESPIRATION RATE: 20 BRPM

## 2024-01-09 DIAGNOSIS — I10 ESSENTIAL HYPERTENSION: Chronic | ICD-10-CM

## 2024-01-09 DIAGNOSIS — Z79.899 ENCOUNTER FOR LONG-TERM (CURRENT) USE OF OTHER MEDICATIONS: Primary | ICD-10-CM

## 2024-01-09 DIAGNOSIS — K21.9 GASTROESOPHAGEAL REFLUX DISEASE WITHOUT ESOPHAGITIS: Chronic | ICD-10-CM

## 2024-01-09 DIAGNOSIS — Z86.73 HISTORY OF CVA (CEREBROVASCULAR ACCIDENT): ICD-10-CM

## 2024-01-09 DIAGNOSIS — Z88.8 ALLERGY TO STATIN MEDICATION: Chronic | ICD-10-CM

## 2024-01-09 DIAGNOSIS — I25.119 CORONARY ARTERY DISEASE INVOLVING NATIVE CORONARY ARTERY OF NATIVE HEART WITH ANGINA PECTORIS: Chronic | ICD-10-CM

## 2024-01-09 DIAGNOSIS — K59.04 CHRONIC IDIOPATHIC CONSTIPATION: Chronic | ICD-10-CM

## 2024-01-09 DIAGNOSIS — I25.10 CORONARY ARTERY DISEASE DUE TO LIPID RICH PLAQUE: Chronic | ICD-10-CM

## 2024-01-09 DIAGNOSIS — I25.83 CORONARY ARTERY DISEASE DUE TO LIPID RICH PLAQUE: Chronic | ICD-10-CM

## 2024-01-09 LAB
ANION GAP SERPL CALCULATED.3IONS-SCNC: 10 MMOL/L (ref 7–16)
BASOPHILS # BLD AUTO: 0.09 K/UL (ref 0–0.2)
BASOPHILS NFR BLD AUTO: 1.2 % (ref 0–1)
BUN SERPL-MCNC: 13 MG/DL (ref 7–18)
BUN/CREAT SERPL: 12 (ref 6–20)
CALCIUM SERPL-MCNC: 9.3 MG/DL (ref 8.5–10.1)
CHLORIDE SERPL-SCNC: 103 MMOL/L (ref 98–107)
CO2 SERPL-SCNC: 30 MMOL/L (ref 21–32)
CREAT SERPL-MCNC: 1.09 MG/DL (ref 0.7–1.3)
DIFFERENTIAL METHOD BLD: ABNORMAL
EGFR (NO RACE VARIABLE) (RUSH/TITUS): 68 ML/MIN/1.73M2
EOSINOPHIL # BLD AUTO: 0.47 K/UL (ref 0–0.5)
EOSINOPHIL NFR BLD AUTO: 6.2 % (ref 1–4)
ERYTHROCYTE [DISTWIDTH] IN BLOOD BY AUTOMATED COUNT: 12.4 % (ref 11.5–14.5)
GLUCOSE SERPL-MCNC: 83 MG/DL (ref 74–106)
HCT VFR BLD AUTO: 43.1 % (ref 40–54)
HGB BLD-MCNC: 14.9 G/DL (ref 13.5–18)
IMM GRANULOCYTES # BLD AUTO: 0.02 K/UL (ref 0–0.04)
IMM GRANULOCYTES NFR BLD: 0.3 % (ref 0–0.4)
LDLC SERPL DIRECT ASSAY-MCNC: 138 MG/DL
LYMPHOCYTES # BLD AUTO: 1.43 K/UL (ref 1–4.8)
LYMPHOCYTES NFR BLD AUTO: 18.8 % (ref 27–41)
MCH RBC QN AUTO: 31.4 PG (ref 27–31)
MCHC RBC AUTO-ENTMCNC: 34.6 G/DL (ref 32–36)
MCV RBC AUTO: 90.9 FL (ref 80–96)
MONOCYTES # BLD AUTO: 0.89 K/UL (ref 0–0.8)
MONOCYTES NFR BLD AUTO: 11.7 % (ref 2–6)
MPC BLD CALC-MCNC: 10.1 FL (ref 9.4–12.4)
NEUTROPHILS # BLD AUTO: 4.69 K/UL (ref 1.8–7.7)
NEUTROPHILS NFR BLD AUTO: 61.8 % (ref 53–65)
NRBC # BLD AUTO: 0 X10E3/UL
NRBC, AUTO (.00): 0 %
PLATELET # BLD AUTO: 305 K/UL (ref 150–400)
POTASSIUM SERPL-SCNC: 4.7 MMOL/L (ref 3.5–5.1)
RBC # BLD AUTO: 4.74 M/UL (ref 4.6–6.2)
SODIUM SERPL-SCNC: 138 MMOL/L (ref 136–145)
TSH SERPL DL<=0.005 MIU/L-ACNC: 1.43 UIU/ML (ref 0.36–3.74)
WBC # BLD AUTO: 7.59 K/UL (ref 4.5–11)

## 2024-01-09 PROCEDURE — 80048 BASIC METABOLIC PNL TOTAL CA: CPT | Mod: ,,, | Performed by: CLINICAL MEDICAL LABORATORY

## 2024-01-09 PROCEDURE — 85025 COMPLETE CBC W/AUTO DIFF WBC: CPT | Mod: ,,, | Performed by: CLINICAL MEDICAL LABORATORY

## 2024-01-09 PROCEDURE — 99214 OFFICE O/P EST MOD 30 MIN: CPT | Mod: ,,, | Performed by: NURSE PRACTITIONER

## 2024-01-09 PROCEDURE — 83721 ASSAY OF BLOOD LIPOPROTEIN: CPT | Mod: ,,, | Performed by: CLINICAL MEDICAL LABORATORY

## 2024-01-09 PROCEDURE — 84443 ASSAY THYROID STIM HORMONE: CPT | Mod: ,,, | Performed by: CLINICAL MEDICAL LABORATORY

## 2024-01-09 RX ORDER — PANTOPRAZOLE SODIUM 40 MG/1
40 TABLET, DELAYED RELEASE ORAL DAILY
Qty: 90 TABLET | Refills: 3 | Status: SHIPPED | OUTPATIENT
Start: 2024-01-09 | End: 2025-01-08

## 2024-01-09 RX ORDER — HYDROCHLOROTHIAZIDE 12.5 MG/1
1 TABLET ORAL DAILY
COMMUNITY
Start: 2023-05-03 | End: 2024-01-09

## 2024-01-09 RX ORDER — TRIAMCINOLONE ACETONIDE 1 MG/G
CREAM TOPICAL
COMMUNITY
Start: 2023-05-03 | End: 2024-01-09 | Stop reason: SDUPTHER

## 2024-01-09 RX ORDER — CLOPIDOGREL BISULFATE 75 MG/1
75 TABLET ORAL DAILY
Qty: 90 TABLET | Refills: 3 | Status: SHIPPED | OUTPATIENT
Start: 2024-01-09

## 2024-01-09 RX ORDER — LOSARTAN POTASSIUM AND HYDROCHLOROTHIAZIDE 12.5; 1 MG/1; MG/1
TABLET ORAL
Qty: 45 TABLET | Refills: 1 | Status: SHIPPED | OUTPATIENT
Start: 2024-01-09

## 2024-01-09 RX ORDER — AMLODIPINE BESYLATE 5 MG/1
5 TABLET ORAL DAILY
Qty: 90 TABLET | Refills: 1 | Status: SHIPPED | OUTPATIENT
Start: 2024-01-09

## 2024-01-09 NOTE — PROGRESS NOTES
"   [facility] FAMILY MEDICINE       PATIENT NAME: Cj Thomas   : 1940    AGE: 83 y.o. DATE OF ENCOUNTER: 24    MRN: [id number]      PCP: Samantha Araujo FNP    Reason for Visit / Chief Complaint:  Hypertension, Follow-up (Patient presents to the clinic for 3m f/u/Flu vaccine refused. ), and Medication Refill         274}    Subjective:     HPI:    Presents past due for f/u HTN, CAD, HLD.  States doing great, no complaints.  Last visit with me for routine f/u Oct 2022 and had AWV Dec 2022.  Can't take any statins "is allergic".  Hasn't f/u with cardiology since 2023, "didn't see a need to go back".    Review of Systems:   Review of Systems   Constitutional: Negative.    HENT: Negative.     Eyes: Negative.    Respiratory: Negative.     Cardiovascular: Negative.    Gastrointestinal:  Positive for constipation. Negative for abdominal pain, blood in stool, diarrhea, nausea and vomiting.   Endocrine: Negative.    Genitourinary: Negative.    Musculoskeletal:  Positive for arthralgias.   Skin: Negative.    Allergic/Immunologic: Negative.    Neurological: Negative.    Hematological: Negative.    Psychiatric/Behavioral: Negative.         Allergies and Meds: 274}     Review of patient's allergies indicates:   Allergen Reactions    Niacin Swelling     "Not sure"    Demerol [meperidine] Other (See Comments)     "Forgets everything"    Iodine and iodide containing products     Statins-hmg-coa reductase inhibitors      Abnormal behavior. Body aches    Iodinated contrast media Rash and Other (See Comments)     Passes out after        Current Outpatient Medications:     aspirin (ECOTRIN) 81 MG EC tablet, Take 81 mg by mouth once daily., Disp: , Rfl:     metoprolol succinate (TOPROL-XL) 25 MG 24 hr tablet, Take 1 tablet (25 mg total) by mouth once daily., Disp: 90 tablet, Rfl: 3    nitroGLYCERIN (NITROSTAT) 0.4 MG SL tablet, Place 1 tablet (0.4 mg total) under the tongue every 5 (five) " minutes as needed for Chest pain., Disp: 25 tablet, Rfl: 3    omega-3 fatty acids/fish oil (FISH OIL-OMEGA-3 FATTY ACIDS) 300-1,000 mg capsule, Take 2 capsules by mouth 2 (two) times a day. , Disp: , Rfl:     tamsulosin (FLOMAX) 0.4 mg Cap, Take 1 capsule (0.4 mg total) by mouth once daily., Disp: 90 capsule, Rfl: 0    traMADoL (ULTRAM) 50 mg tablet, Take 1 tablet (50 mg total) by mouth every 6 (six) hours as needed for Pain., Disp: 30 each, Rfl: 0    triamcinolone acetonide 0.1% (KENALOG) 0.1 % ointment, Apply topically 2 (two) times daily., Disp: 454 g, Rfl: 0    amLODIPine (NORVASC) 5 MG tablet, Take 1 tablet (5 mg total) by mouth once daily., Disp: 90 tablet, Rfl: 1    clopidogreL (PLAVIX) 75 mg tablet, Take 1 tablet (75 mg total) by mouth once daily., Disp: 90 tablet, Rfl: 3    linaCLOtide (LINZESS) 145 mcg Cap capsule, Take 1 capsule (145 mcg total) by mouth before breakfast., Disp: 90 capsule, Rfl: 3    losartan-hydrochlorothiazide 100-12.5 mg (HYZAAR) 100-12.5 mg Tab, Take 1/2 tablet by mouth daily for HTN., Disp: 45 tablet, Rfl: 1    pantoprazole (PROTONIX) 40 MG tablet, Take 1 tablet (40 mg total) by mouth once daily., Disp: 90 tablet, Rfl: 3    rosuvastatin (CRESTOR) 10 MG tablet, Take 1 tablet (10 mg total) by mouth once daily. (Patient not taking: Reported on 1/9/2024), Disp: 90 tablet, Rfl: 3    Labs:274}   I have reviewed labs below:  Lab Results   Component Value Date    WBC 8.08 11/09/2022    RBC 4.92 11/09/2022    HGB 15.6 11/09/2022    HCT 45.0 11/09/2022     11/09/2022     (L) 11/09/2022    K 4.0 11/09/2022     11/09/2022    CALCIUM 9.0 11/09/2022     11/09/2022    BUN 10 11/09/2022    CREATININE 0.98 11/09/2022    EGFRNONAA 86 04/18/2022    ALT 21 03/28/2023    AST 12 (L) 04/18/2022    CHOL 124 03/28/2023    TRIG 108 03/28/2023    HDL 49 03/28/2023    LDLCALC 53 03/28/2023    TSH 1.740 04/18/2022       Medical History: 274}     Past Medical History:   Diagnosis Date     Abnormal echocardiogram 11/10/2022    Anticoagulant long-term use     Benign prostate hyperplasia     Coronary artery disease involving native coronary artery of native heart with angina pectoris     COVID-19 02/10/2022    GERD (gastroesophageal reflux disease)     Hyperlipidemia     Hypertension     Osteoarthritis     Stroke       Social History     Tobacco Use   Smoking Status Never   Smokeless Tobacco Never      Past Surgical History:   Procedure Laterality Date    ARTHROSCOPY OF KNEE Left 2/21/2023    Procedure: ARTHROSCOPY, KNEE;  Surgeon: Garrett Shipley MD;  Location: [facility] OR;  Service: Orthopedics;  Laterality: Left;    CARTILAGE-SHAVING Left 2/21/2023    Procedure: CARTILAGE-SHAVING;  Surgeon: Garrett Shipley MD;  Location: [facility]THO OR;  Service: Orthopedics;  Laterality: Left;  shaving of medial femoral condyle    KNEE ARTHROSCOPY W/ MENISCECTOMY Left 2/21/2023    Procedure: ARTHROSCOPY, KNEE, WITH MENISCECTOMY;  Surgeon: Garrett Shipley MD;  Location: [facility] OR;  Service: Orthopedics;  Laterality: Left;  partial medial and lateral menisectomy      LEFT HEART CATHETERIZATION Right 11/15/2022    Procedure: Left heart cath;  Surgeon: Roby Zapata MD;  Location: [facility] CATH LAB;  Service: Cardiology;  Laterality: Right;  right wrist    TONSILLECTOMY      TOTAL KNEE ARTHROPLASTY Right         Health Maintenance: 274}     Health Maintenance         Date Due Completion Date    RSV Vaccine (Age 60+ and Pregnant patients) (1 - 1-dose 60+ series) Never done ---    Influenza Vaccine (1) 06/30/2024 (Originally 9/1/2023) 11/2/2018    Shingles Vaccine (1 of 2) 10/20/2032 (Originally 10/1/1991) ---    Aspirin/Antiplatelet Therapy 01/09/2025 1/9/2024    TETANUS VACCINE 09/07/2027 9/7/2017    Lipid Panel 01/09/2029 1/9/2024    Override on 2/3/2021: Done ( - will not take cholesterol med)          Immunization History   Administered Date(s) Administered    Influenza  "01/21/2011, 10/01/2011, 11/01/2013    Influenza - High Dose - PF (65 years and older) 12/28/2006, 11/02/2016, 11/02/2018    Pneumococcal 02/28/2006    Pneumococcal Conjugate - 13 Valent 04/09/2018    Pneumococcal Polysaccharide - 23 Valent 02/28/2006, 06/20/2014, 04/01/2018    Td (Adult), Unspecified Formulation 07/01/2005    Tdap 11/20/2015, 09/07/2017     Objective:  274}   /62 (BP Location: Right arm, Patient Position: Sitting, BP Method: Large (Automatic))   Pulse 63   Temp 98 °F (36.7 °C) (Oral)   Resp 20   Ht 5' 11" (1.803 m)   Wt 97.5 kg (215 lb)   SpO2 95%   BMI 29.99 kg/m²     Wt Readings from Last 3 Encounters:   01/09/24 97.5 kg (215 lb)   08/01/23 95.8 kg (211 lb 1.9 oz)   02/21/23 97.5 kg (215 lb)     BP Readings from Last 3 Encounters:   01/09/24 127/62   08/01/23 128/64   02/21/23 135/83     Body mass index is 29.99 kg/m².     Physical Exam  Vitals and nursing note reviewed.   Constitutional:       General: He is not in acute distress.     Appearance: Normal appearance. He is not ill-appearing, toxic-appearing or diaphoretic.   HENT:      Head: Normocephalic.      Nose: Nose normal.      Mouth/Throat:      Mouth: Mucous membranes are moist.      Pharynx: Oropharynx is clear.   Eyes:      Conjunctiva/sclera: Conjunctivae normal.      Pupils: Pupils are equal, round, and reactive to light.   Neck:      Thyroid: No thyromegaly.      Vascular: Normal carotid pulses. No carotid bruit.   Cardiovascular:      Rate and Rhythm: Normal rate and regular rhythm.      Pulses: Normal pulses.      Heart sounds: Normal heart sounds.   Pulmonary:      Effort: Pulmonary effort is normal. No respiratory distress.      Breath sounds: Normal breath sounds. No wheezing, rhonchi or rales.   Abdominal:      General: Bowel sounds are normal.      Palpations: Abdomen is soft.      Tenderness: There is no abdominal tenderness.   Musculoskeletal:      Cervical back: Neck supple.      Right lower leg: No edema.      " Left lower leg: No edema.   Lymphadenopathy:      Cervical: No cervical adenopathy.   Skin:     General: Skin is warm and dry.      Coloration: Skin is not jaundiced or pale.   Neurological:      General: No focal deficit present.      Mental Status: He is alert and oriented to person, place, and time.      Gait: Gait normal.   Psychiatric:         Mood and Affect: Mood normal.         Behavior: Behavior normal.          Assessment and Plan: 274}     1. Encounter for long-term (current) use of other medications  -     CBC Auto Differential; Future; Expected date: 01/09/2024  -     Basic Metabolic Panel; Future; Expected date: 01/09/2024  -     TSH; Future; Expected date: 01/09/2024    2. Chronic idiopathic constipation  -     linaCLOtide (LINZESS) 145 mcg Cap capsule; Take 1 capsule (145 mcg total) by mouth before breakfast.  Dispense: 90 capsule; Refill: 3    3. Gastroesophageal reflux disease without esophagitis  Comments:  Controlled, continue protonix.  Orders:  -     pantoprazole (PROTONIX) 40 MG tablet; Take 1 tablet (40 mg total) by mouth once daily.  Dispense: 90 tablet; Refill: 3    4. Coronary artery disease involving native coronary artery of native heart with angina pectoris  Comments:  stable, continue current treament  It is unfortunate that he is intolerant of all statins and had rash with zetia.  Denies CP or SOB.  Has NTG for prn use.  Orders:  -     LDL Cholesterol, Direct; Future; Expected date: 01/09/2024  -     clopidogreL (PLAVIX) 75 mg tablet; Take 1 tablet (75 mg total) by mouth once daily.  Dispense: 90 tablet; Refill: 3    5. Essential hypertension  Assessment & Plan:  The current medical regimen is effective;  continue present plan and medications.    Orders:  -     losartan-hydrochlorothiazide 100-12.5 mg (HYZAAR) 100-12.5 mg Tab; Take 1/2 tablet by mouth daily for HTN.  Dispense: 45 tablet; Refill: 1  -     amLODIPine (NORVASC) 5 MG tablet; Take 1 tablet (5 mg total) by mouth once daily.   Dispense: 90 tablet; Refill: 1  -     Basic Metabolic Panel; Future; Expected date: 01/09/2024    6. Allergy to statin medication    7. History of CVA (cerebrovascular accident)  -     clopidogreL (PLAVIX) 75 mg tablet; Take 1 tablet (75 mg total) by mouth once daily.  Dispense: 90 tablet; Refill: 3    8. Coronary artery disease due to lipid rich plaque  Assessment & Plan:  [facility] 11/15/22 w/ stent to RCA  Intolerant of all statins; zetia caused rash.  Continue Plavix, continue metoprolol XL.         Declines flu vaccine.    Return to clinic 6 mth f/u HTN; and sooner as needed.    Future Appointments   Date Time Provider Department Center   2/8/2024 11:00 AM AWCAROLINE NURSE, [facility] FAMILY MEDICINE [facility]   7/16/2024 10:20 AM Samantha Araujo FNP [facility]        Signature:  AR Friedman

## 2024-01-10 NOTE — ASSESSMENT & PLAN NOTE
[facility] 11/15/22 w/ stent to RCA  Intolerant of all statins; zetia caused rash.  Continue Plavix, continue metoprolol XL.

## 2024-01-16 NOTE — PROGRESS NOTES
Call pt and review results. Labs look okay, other than LDL cholesterol is too high.  However he is intolerant of statins and zetia caused itching.  I don't know if he would even try fenofibrate. Detail Level: Detailed Detail Level: Zone

## 2024-01-17 DIAGNOSIS — E78.2 MIXED HYPERLIPIDEMIA: Primary | Chronic | ICD-10-CM

## 2024-01-17 RX ORDER — FENOFIBRATE 160 MG/1
160 TABLET ORAL DAILY
Qty: 90 TABLET | Refills: 3 | Status: SHIPPED | OUTPATIENT
Start: 2024-01-17 | End: 2025-01-16

## 2024-01-17 NOTE — PROGRESS NOTES
Sent Rx for fenofibrate [facility] pharmacy to try that because he is high risk for stroke or heart attack.

## 2024-02-01 NOTE — PROGRESS NOTES
"   [facility]IENT NAME: Cj Thomas   : 1940    AGE: 83 y.o. DATE OF ENCOUNTER: 24    MRN: [id number]      Cj Thomas presented for a  Medicare AWV and comprehensive Health Risk Assessment today. The following components were reviewed and updated:    Medical history  Family History  Social history  Allergies and Current Medications  Health Risk Assessment  Health Maintenance  Care Team       ** See Completed Assessments for Annual Wellness Visit within the encounter summary.**         The following assessments were completed:  Living Situation  CAGE  Depression Screening  Timed Get Up and Go  Whisper Test  Cognitive Function Screening  Nutrition Screening  ADL Screening  PAQ Screening        Vitals:    24 1107 24 1113   BP: (!) 160/90 (!) 150/80   BP Location: Right arm Left arm   Patient Position: Sitting    Pulse: 63    Resp: 16    Temp: 97.4 °F (36.3 °C)    TempSrc: Oral    SpO2: 97%    Weight: 98 kg (216 lb)    Height: 5' 10" (1.778 m)      Body mass index is 30.99 kg/m².  Physical Exam  Vitals and nursing note reviewed.   Constitutional:       General: He is not in acute distress.     Appearance: Normal appearance. He is not ill-appearing.   HENT:      Head: Normocephalic.   Eyes:      Conjunctiva/sclera: Conjunctivae normal.   Cardiovascular:      Rate and Rhythm: Normal rate and regular rhythm.      Heart sounds: Normal heart sounds.   Pulmonary:      Effort: Pulmonary effort is normal. No respiratory distress.      Breath sounds: Normal breath sounds.   Skin:     General: Skin is warm and dry.      Coloration: Skin is not jaundiced or pale.   Neurological:      Mental Status: He is alert and oriented to person, place, and time.      Gait: Gait normal.   Psychiatric:         Mood and Affect: Mood normal.         Behavior: Behavior normal.         Thought Content: Thought content normal.         Judgment: Judgment normal.         " "    Diagnoses and health risks identified today and associated recommendations/orders:    1. Encounter for subsequent annual wellness visit (AWV) in Medicare patient    2. Essential hypertension  Comments:  Not controlled at today's visit but was < 140/90 over at visit one-mth ago.  We will have nurse call him in 1 week for remote BP reading.    3. Mixed hyperlipidemia  Comments:  Allergic to statins.    Had side effects with Zetia.    Agreed to start fenofibrate Rx sent January 2024 but states not taking it, "made him feel funny".    4. Coronary artery disease due to lipid rich plaque  Comments:  s/p stent pRCA 11/15/22  Stable, followed by Cardiology.  Orders:  -     Discontinue: metoprolol succinate (TOPROL-XL) 25 MG 24 hr tablet; Take 1 tablet (25 mg total) by mouth once daily.  Dispense: 90 tablet; Refill: 3  -     metoprolol succinate (TOPROL-XL) 25 MG 24 hr tablet; Take 1 tablet (25 mg total) by mouth once daily.  Dispense: 90 tablet; Refill: 3    5. History of CVA (cerebrovascular accident)  Comments:  No residual deficits.    6. BMI 30.0-30.9,adult    7. Obesity, Class I, BMI 30-34.9  Assessment & Plan:  Body mass index is 30.99 kg/m². Morbid obesity complicates all aspects of disease management from diagnostic modalities to treatment. Weight loss encouraged and health benefits explained to patient.       8. Allergy to statin medication       Provided Cj with a 5-10 year written screening schedule and personal prevention plan. Recommendations were developed using the USPSTF age appropriate recommendations. Education, counseling, and referrals were provided as needed. After Visit Summary printed and given to patient which includes a list of additional screenings\tests needed.    Follow up in about 1 year (around 2/8/2025).    AR Friedman    RSV vaccine - VIS(10/19/2023) given with instructions to take at pharmacy, Influenza vaccine - declined, Covid vaccine - declined, Shingles vaccine - " declined.     I offered to discuss advanced care planning, including how to pick a person who would make decisions for you if you were unable to make them for yourself, called a health care power of , and what kind of decisions you might make such as use of life sustaining treatments such as ventilators and tube feeding when faced with a life limiting illness recorded on a living will that they will need to know. (How you want to be cared for as you near the end of your natural life)     X Patient is interested in learning more about how to make advanced directives.  I provided them paperwork and offered to discuss this with them.

## 2024-02-08 ENCOUNTER — OFFICE VISIT (OUTPATIENT)
Dept: FAMILY MEDICINE | Facility: CLINIC | Age: 84
End: 2024-02-08
Payer: MEDICARE

## 2024-02-08 VITALS
TEMPERATURE: 97 F | BODY MASS INDEX: 30.92 KG/M2 | RESPIRATION RATE: 16 BRPM | WEIGHT: 216 LBS | HEIGHT: 70 IN | SYSTOLIC BLOOD PRESSURE: 150 MMHG | DIASTOLIC BLOOD PRESSURE: 80 MMHG | HEART RATE: 63 BPM | OXYGEN SATURATION: 97 %

## 2024-02-08 DIAGNOSIS — Z00.00 ENCOUNTER FOR SUBSEQUENT ANNUAL WELLNESS VISIT (AWV) IN MEDICARE PATIENT: Primary | ICD-10-CM

## 2024-02-08 DIAGNOSIS — I25.10 CORONARY ARTERY DISEASE DUE TO LIPID RICH PLAQUE: Chronic | ICD-10-CM

## 2024-02-08 DIAGNOSIS — I25.83 CORONARY ARTERY DISEASE DUE TO LIPID RICH PLAQUE: Chronic | ICD-10-CM

## 2024-02-08 DIAGNOSIS — I10 ESSENTIAL HYPERTENSION: Chronic | ICD-10-CM

## 2024-02-08 DIAGNOSIS — E78.2 MIXED HYPERLIPIDEMIA: Chronic | ICD-10-CM

## 2024-02-08 DIAGNOSIS — E66.9 OBESITY, CLASS I, BMI 30-34.9: Chronic | ICD-10-CM

## 2024-02-08 DIAGNOSIS — Z88.8 ALLERGY TO STATIN MEDICATION: Chronic | ICD-10-CM

## 2024-02-08 DIAGNOSIS — Z86.73 HISTORY OF CVA (CEREBROVASCULAR ACCIDENT): Chronic | ICD-10-CM

## 2024-02-08 PROCEDURE — G0439 PPPS, SUBSEQ VISIT: HCPCS | Mod: ,,, | Performed by: NURSE PRACTITIONER

## 2024-02-08 RX ORDER — ASCORBIC ACID 500 MG
500 TABLET ORAL DAILY
COMMUNITY

## 2024-02-08 RX ORDER — METOPROLOL SUCCINATE 25 MG/1
25 TABLET, EXTENDED RELEASE ORAL DAILY
Qty: 90 TABLET | Refills: 3 | Status: SHIPPED | OUTPATIENT
Start: 2024-02-08 | End: 2024-02-08

## 2024-02-08 RX ORDER — METOPROLOL SUCCINATE 25 MG/1
25 TABLET, EXTENDED RELEASE ORAL DAILY
Qty: 90 TABLET | Refills: 3 | Status: SHIPPED | OUTPATIENT
Start: 2024-02-08 | End: 2025-02-07

## 2024-02-08 NOTE — PATIENT INSTRUCTIONS
Counseling and Referral of Other Preventative  (Italic type indicates deductible and co-insurance are waived)    Patient Name: Cj Thomas  Today's Date: 2/8/2024    Health Maintenance       Date Due Completion Date    RSV Vaccine (Age 60+ and Pregnant patients) (1 - 1-dose 60+ series) Never done ---    Influenza Vaccine (1) 06/30/2024 (Originally 9/1/2023) 11/2/2018    Shingles Vaccine (1 of 2) 10/20/2032 (Originally 10/1/1991) ---    Aspirin/Antiplatelet Therapy 01/09/2025 1/9/2024    TETANUS VACCINE 09/07/2027 9/7/2017    Lipid Panel 01/09/2029 1/9/2024    Override on 2/3/2021: Done ( - will not take cholesterol med)        No orders of the defined types were placed in this encounter.      The following information is provided to all patients.  This information is to help you find resources for any of the problems found today that may be affecting your health:                  Living healthy guide: ms.gov    Understanding Diabetes: www.diabetes.org      Eating healthy: www.cdc.gov/healthyweight      CDC home safety checklist: www.cdc.gov/steadi/patient.html      Agency on Aging: ms.gov    Alcoholics anonymous (AA): www.aa.org      Physical Activity: www.katy.nih.gov/sx0fqzf      Tobacco use: ms.gov

## 2024-02-08 NOTE — ASSESSMENT & PLAN NOTE
Body mass index is 30.99 kg/m². Morbid obesity complicates all aspects of disease management from diagnostic modalities to treatment. Weight loss encouraged and health benefits explained to patient.

## 2024-02-16 VITALS — DIASTOLIC BLOOD PRESSURE: 68 MMHG | SYSTOLIC BLOOD PRESSURE: 132 MMHG

## 2024-03-22 DIAGNOSIS — N40.0 BENIGN PROSTATIC HYPERPLASIA WITHOUT URINARY OBSTRUCTION: Chronic | ICD-10-CM

## 2024-03-22 NOTE — TELEPHONE ENCOUNTER
----- Message from Jose E Smith sent at 3/22/2024  8:24 AM CDT -----  TAMSULOSIN 4MG TO [facility]

## 2024-03-23 RX ORDER — TAMSULOSIN HYDROCHLORIDE 0.4 MG/1
1 CAPSULE ORAL DAILY
Qty: 90 CAPSULE | Refills: 3 | Status: SHIPPED | OUTPATIENT
Start: 2024-03-23

## 2024-03-25 DIAGNOSIS — I10 ESSENTIAL HYPERTENSION: ICD-10-CM

## 2024-03-25 RX ORDER — LOSARTAN POTASSIUM AND HYDROCHLOROTHIAZIDE 12.5; 1 MG/1; MG/1
TABLET ORAL
Qty: 45 TABLET | Refills: 1 | Status: SHIPPED | OUTPATIENT
Start: 2024-03-25

## 2024-03-25 NOTE — TELEPHONE ENCOUNTER
----- Message from Gracy Garcia sent at 3/25/2024  8:45 AM CDT -----  losartan-hydrochlorothiazide [place]

## 2024-04-09 ENCOUNTER — TELEPHONE (OUTPATIENT)
Dept: FAMILY MEDICINE | Facility: CLINIC | Age: 84
End: 2024-04-09
Payer: MEDICARE

## 2024-04-09 DIAGNOSIS — I10 ESSENTIAL HYPERTENSION: ICD-10-CM

## 2024-04-09 NOTE — TELEPHONE ENCOUNTER
----- Message from Valeria Angulo sent at 4/8/2024  8:14 AM CDT -----  Pt called and needs a refill on his BP sent to [facility] call back number [phone]

## 2024-04-16 DIAGNOSIS — I25.119 CORONARY ARTERY DISEASE INVOLVING NATIVE CORONARY ARTERY OF NATIVE HEART WITH ANGINA PECTORIS: Chronic | ICD-10-CM

## 2024-04-16 DIAGNOSIS — Z86.73 HISTORY OF CVA (CEREBROVASCULAR ACCIDENT): ICD-10-CM

## 2024-04-16 RX ORDER — CLOPIDOGREL BISULFATE 75 MG/1
75 TABLET ORAL DAILY
Qty: 90 TABLET | Refills: 3 | OUTPATIENT
Start: 2024-04-16

## 2024-04-22 ENCOUNTER — HOSPITAL ENCOUNTER (OUTPATIENT)
Dept: RADIOLOGY | Facility: HOSPITAL | Age: 84
Discharge: HOME | End: 2024-04-22
Payer: MEDICARE

## 2024-04-22 ENCOUNTER — OFFICE VISIT (OUTPATIENT)
Dept: ORTHOPEDICS | Facility: CLINIC | Age: 84
End: 2024-04-22
Payer: MEDICARE

## 2024-04-22 VITALS — WEIGHT: 216 LBS | HEIGHT: 70 IN | BODY MASS INDEX: 30.92 KG/M2

## 2024-04-22 DIAGNOSIS — M25.552 LEFT HIP PAIN: ICD-10-CM

## 2024-04-22 DIAGNOSIS — G89.29 CHRONIC LEFT-SIDED LOW BACK PAIN, UNSPECIFIED WHETHER SCIATICA PRESENT: ICD-10-CM

## 2024-04-22 DIAGNOSIS — G89.29 CHRONIC LEFT-SIDED LOW BACK PAIN, UNSPECIFIED WHETHER SCIATICA PRESENT: Primary | ICD-10-CM

## 2024-04-22 DIAGNOSIS — M54.50 CHRONIC LEFT-SIDED LOW BACK PAIN, UNSPECIFIED WHETHER SCIATICA PRESENT: Primary | ICD-10-CM

## 2024-04-22 DIAGNOSIS — M25.552 LEFT HIP PAIN: Primary | ICD-10-CM

## 2024-04-22 DIAGNOSIS — M54.50 CHRONIC LEFT-SIDED LOW BACK PAIN, UNSPECIFIED WHETHER SCIATICA PRESENT: ICD-10-CM

## 2024-04-22 PROCEDURE — 72110 X-RAY EXAM L-2 SPINE 4/>VWS: CPT | Mod: TC

## 2024-04-22 PROCEDURE — 99214 OFFICE O/P EST MOD 30 MIN: CPT | Mod: PBBFAC,25

## 2024-04-22 PROCEDURE — 72110 X-RAY EXAM L-2 SPINE 4/>VWS: CPT | Mod: 26,,, | Performed by: RADIOLOGY

## 2024-04-22 PROCEDURE — 73502 X-RAY EXAM HIP UNI 2-3 VIEWS: CPT | Mod: TC,LT

## 2024-04-22 PROCEDURE — 99213 OFFICE O/P EST LOW 20 MIN: CPT | Mod: S$PBB,,,

## 2024-04-22 PROCEDURE — 73502 X-RAY EXAM HIP UNI 2-3 VIEWS: CPT | Mod: 26,LT,, | Performed by: RADIOLOGY

## 2024-04-22 NOTE — PROGRESS NOTES
CC:   Chief Complaint   Patient presents with    Left Hip - Pain          Cj Thomas is a 83 y.o. male seen today for follow up of Pain of the Left Hip  Patient known to [facility] previously seen by Roby Chand in July of 2023 with complaints of bilateral neck and shoulder pain as well as left buttock pain.  At that time Roby ordered an MRI cervical spine and left shoulder, patient was unable to go to MRI appointment.  Patient presents today with 2 week history of left hip and back pain.  Patient's wife states that he regularly picks up large boxes.  Patient has used multiple topical ointments, unable to take NSAIDs due to Plavix use.  Patient denies any recent fall or injury.  No other complaints today.        PAST MEDICAL HISTORY:   Past Medical History:   Diagnosis Date    Abnormal echocardiogram 11/10/2022    Anticoagulant long-term use     Benign prostate hyperplasia     Coronary artery disease involving native coronary artery of native heart with angina pectoris     COVID-19 02/10/2022    GERD (gastroesophageal reflux disease)     Hyperlipidemia     Hypertension     Osteoarthritis     Stroke         PAST SURGICAL HISTORY:   Past Surgical History:   Procedure Laterality Date    ARTHROSCOPY OF KNEE Left 2/21/2023    Procedure: ARTHROSCOPY, KNEE;  Surgeon: Garrett Shipley MD;  Location: [facility];  Service: Orthopedics;  Laterality: Left;    CARTILAGE-SHAVING Left 2/21/2023    Procedure: CARTILAGE-SHAVING;  Surgeon: Garrett Shipley MD;  Location: [facility];  Service: Orthopedics;  Laterality: Left;  shaving of medial femoral condyle    KNEE ARTHROSCOPY W/ MENISCECTOMY Left 2/21/2023    Procedure: ARTHROSCOPY, KNEE, WITH MENISCECTOMY;  Surgeon: Garrett Shipley MD;  Location: [facility];  Service: Orthopedics;  Laterality: Left;  partial medial and lateral menisectomy      LEFT HEART CATHETERIZATION Right 11/15/2022    Procedure: Left heart  "cath;  Surgeon: Roby Zapata MD;  [facility] C[facility] LAB;  Service: Cardiology;  Laterality: Right;  right wrist    TONSILLECTOMY      TOTAL KNEE ARTHROPLASTY Right         ALLERGIES:   Review of patient's allergies indicates:   Allergen Reactions    Niacin Swelling     "Not sure"    Demerol [meperidine] Other (See Comments)     "Forgets everything"    Iodine and iodide containing products     Statins-hmg-coa reductase inhibitors      Abnormal behavior. Body aches    Iodinated contrast media Rash and Other (See Comments)     Passes out after        MEDICATIONS :    Current Outpatient Medications:     amLODIPine (NORVASC) 5 MG tablet, Take 1 tablet (5 mg total) by mouth once daily., Disp: 90 tablet, Rfl: 1    ascorbic acid, vitamin C, (VITAMIN C) 500 MG tablet, Take 500 mg by mouth once daily., Disp: , Rfl:     aspirin (ECOTRIN) 81 MG EC tablet, Take 81 mg by mouth once daily., Disp: , Rfl:     clopidogreL (PLAVIX) 75 mg tablet, Take 1 tablet (75 mg total) by mouth once daily., Disp: 90 tablet, Rfl: 3    fenofibrate 160 MG Tab, Take 1 tablet (160 mg total) by mouth once daily. (Patient not taking: Reported on 2/8/2024), Disp: 90 tablet, Rfl: 3    losartan-hydrochlorothiazide 100-12.5 mg (HYZAAR) 100-12.5 mg Tab, Take 1/2 tablet by mouth daily for HTN., Disp: 45 tablet, Rfl: 1    metoprolol succinate (TOPROL-XL) 25 MG 24 hr tablet, Take 1 tablet (25 mg total) by mouth once daily., Disp: 90 tablet, Rfl: 3    nitroGLYCERIN (NITROSTAT) 0.4 MG SL tablet, Place 1 tablet (0.4 mg total) under the tongue every 5 (five) minutes as needed for Chest pain., Disp: 25 tablet, Rfl: 3    omega-3 fatty acids/fish oil (FISH OIL-OMEGA-3 FATTY ACIDS) 300-1,000 mg capsule, Take 2 capsules by mouth 2 (two) times a day. , Disp: , Rfl:     pantoprazole (PROTONIX) 40 MG tablet, Take 1 tablet (40 mg total) by mouth once daily. (Patient not taking: Reported on 2/8/2024), Disp: 90 tablet, Rfl: 3    POTASSIUM GLUCONATE ORAL, Take by " mouth Daily., Disp: , Rfl:     tamsulosin (FLOMAX) 0.4 mg Cap, Take 1 capsule (0.4 mg total) by mouth once daily., Disp: 90 capsule, Rfl: 3    triamcinolone acetonide 0.1% (KENALOG) 0.1 % ointment, Apply topically 2 (two) times daily., Disp: 454 g, Rfl: 0     SOCIAL HISTORY:   Social History     Socioeconomic History    Marital status:     Number of children: 3   Tobacco Use    Smoking status: Never     Passive exposure: Never    Smokeless tobacco: Never   Substance and Sexual Activity    Alcohol use: Not Currently    Drug use: Never    Sexual activity: Not Currently     Social Determinants of Health     Financial Resource Strain: Medium Risk (2/8/2024)    Overall Financial Resource Strain (CARDIA)     Difficulty of Paying Living Expenses: Somewhat hard   Food Insecurity: No Food Insecurity (2/8/2024)    Hunger Vital Sign     Worried About Running Out of Food in the Last Year: Never true     Ran Out of Food in the Last Year: Never true   Transportation Needs: No Transportation Needs (2/8/2024)    PRAPARE - Transportation     Lack of Transportation (Medical): No     Lack of Transportation (Non-Medical): No   Physical Activity: Insufficiently Active (2/8/2024)    Exercise Vital Sign     Days of Exercise per Week: 5 days     Minutes of Exercise per Session: 20 min   Stress: No Stress Concern Present (2/8/2024)    [employer] [employer] - Occupational Stress Questionnaire     Feeling of Stress : Not at all   Social Connections: Socially Integrated (2/8/2024)    Social Connection and Isolation Panel [NHANES]     Frequency of Communication with Friends and Family: More than three times a week     Frequency of Social Gatherings with Friends and Family: Once a week     Attends Muslim Services: More than 4 times per year     Active Member of Clubs or Organizations: Yes     Attends Club or Organization Meetings: More than 4 times per year     Marital Status:    Housing Stability: Low Risk   (2/8/2024)    Housing Stability Vital Sign     Unable to Pay for Housing in the Last Year: No     Number of Places Lived in the Last Year: 1     Unstable Housing in the Last Year: No        FAMILY HISTORY:   Family History   Problem Relation Name Age of Onset    Heart disease Mother      Heart disease Father      Hypertension Sister      Thyroid disease Daughter      Diabetes Son      No Known Problems Maternal Grandmother      No Known Problems Maternal Grandfather      Dementia Paternal Grandmother      No Known Problems Paternal Grandfather      Hypertension Sister      Hypertension Sister      Hypertension Sister      Hypertension Sister      No Known Problems Son            PHYSICAL EXAM:      There were no vitals filed for this visit.  Body mass index is 30.99 kg/m².    GENERAL: Well-developed, well-nourished male . The patient is alert, oriented and cooperative.    EXTREMITIES:   Left hip:  Nontender to palpation over greater trochanteric bursa, good range of motion with flexion internal and external rotation of hip, no groin pain on exam, neurovascularly intact   Positive left straight leg raise      RADIOGRAPHIC FINDINGS:   X-Ray Hip 2 or 3 views Left (with Pelvis when performed)    Result Date: 4/22/2024  EXAMINATION: XR HIP WITH PELVIS WHEN PERFORMED, 2 OR 3 VIEWS LEFT CLINICAL HISTORY: Pain in left hip COMPARISON: 28 July 2023 TECHNIQUE: XR HIP WITH PELVIS 3 VIEWS LEFT FINDINGS: No evidence of fracture seen.  The alignment of the joints appears normal.  Moderate right and mild-to-moderate left hip degenerative change is present.  No soft tissue abnormality is seen.     Osteoarthrosis as described above. Electronically signed by: Braydon Kemp Date:    04/22/2024 Time:    13:15       Patient Active Problem List    Diagnosis Date Noted    Coronary artery disease due to lipid rich plaque 01/09/2024    Allergy to statin medication 01/09/2024    Cervical neuralgia 07/28/2023    Primary osteoarthritis of  left hip 07/28/2023    Impingement of left shoulder 07/28/2023    Status post arthroscopy of left knee 02/28/2023    Internal derangement of left knee 02/15/2023    Stable angina 12/27/2022    Abnormal cardiovascular stress test 11/10/2022    Osteoarthritis 10/28/2022    Acute medial meniscus tear of left knee 10/24/2022    Insufficiency fracture of tibia 10/24/2022    Benign prostatic hyperplasia without urinary obstruction 10/20/2022    Obesity, Class I, BMI 30-34.9     Acute pain of left knee 10/17/2022    Primary osteoarthritis of both knees 09/29/2022    Primary osteoarthritis of left knee 09/08/2022    Plantar fasciitis of left foot 08/18/2022    Primary osteoarthritis of right knee 07/18/2022    History of CVA (cerebrovascular accident) 02/10/2022    Gastroesophageal reflux disease without esophagitis 02/01/2022    Mixed hyperlipidemia     Acute pain of left shoulder 08/17/2021    Essential hypertension 03/30/2021     IMPRESSION AND PLAN:  Lower back pain radiating to the left hip.  Personally reviewed x-rays today with moderate DJD changes of left hip, no acute fracture or dislocation.  Discussed all treatment options with the patient today.  We will obtain a x-ray of the lumbar spine and refer to pain management.  Follow up p.r.n..        No follow-ups on file.       Ladi Shah PA-C      (Subject to voice recognition error, transcription service not allowed)

## 2024-04-25 ENCOUNTER — TELEPHONE (OUTPATIENT)
Dept: ORTHOPEDICS | Facility: CLINIC | Age: 84
End: 2024-04-25
Payer: MEDICARE

## 2024-04-25 NOTE — TELEPHONE ENCOUNTER
----- Message from Chelsea Newberry sent at 4/25/2024  8:17 AM CDT -----  Regarding: REFERRAL  PATIENT CALLING TO CHECK ON THE REFERRAL TO SEE DR JUDIE LEHMAN, CALL BACK NUMBER [phone]

## 2024-06-10 ENCOUNTER — TELEPHONE (OUTPATIENT)
Dept: FAMILY MEDICINE | Facility: CLINIC | Age: 84
End: 2024-06-10
Payer: MEDICARE

## 2024-06-10 NOTE — TELEPHONE ENCOUNTER
[facility] called for a refill on Meloxicam.  The last prescription I see is from 2021.

## 2024-06-10 NOTE — TELEPHONE ENCOUNTER
He can not take meloxicam.  He is on aspirin and Plavix and has heart disease.  The only thing he can take for pain is Tylenol/acetaminophen.

## 2024-06-17 ENCOUNTER — HOSPITAL ENCOUNTER (OUTPATIENT)
Dept: RADIOLOGY | Facility: HOSPITAL | Age: 84
Discharge: HOME | End: 2024-06-17
Attending: ORTHOPAEDIC SURGERY
Payer: MEDICARE

## 2024-06-17 ENCOUNTER — OFFICE VISIT (OUTPATIENT)
Dept: SPINE | Facility: CLINIC | Age: 84
End: 2024-06-17
Payer: MEDICARE

## 2024-06-17 DIAGNOSIS — M51.36 DDD (DEGENERATIVE DISC DISEASE), LUMBAR: Primary | ICD-10-CM

## 2024-06-17 DIAGNOSIS — M54.16 LUMBAR RADICULOPATHY: Primary | ICD-10-CM

## 2024-06-17 DIAGNOSIS — M54.16 LUMBAR RADICULOPATHY: ICD-10-CM

## 2024-06-17 PROCEDURE — 99213 OFFICE O/P EST LOW 20 MIN: CPT | Mod: PBBFAC,25 | Performed by: ORTHOPAEDIC SURGERY

## 2024-06-17 PROCEDURE — 72110 X-RAY EXAM L-2 SPINE 4/>VWS: CPT | Mod: TC

## 2024-06-17 PROCEDURE — 99204 OFFICE O/P NEW MOD 45 MIN: CPT | Mod: S$PBB,,, | Performed by: ORTHOPAEDIC SURGERY

## 2024-06-17 PROCEDURE — 99999 PR PBB SHADOW E&M-EST. PATIENT-LVL III: CPT | Mod: PBBFAC,,, | Performed by: ORTHOPAEDIC SURGERY

## 2024-06-17 RX ORDER — GABAPENTIN 300 MG/1
300 CAPSULE ORAL 3 TIMES DAILY
Qty: 90 CAPSULE | Refills: 5 | Status: SHIPPED | OUTPATIENT
Start: 2024-06-17

## 2024-06-17 NOTE — PROGRESS NOTES
AP, lateral, flexion/extension views of the lumbar spine reviewed    On the AP there is lumbar curvature to the right.  There are 5 non-rib-bearing lumbar vertebrae.  On the lateral there is decreased lumbar lordosis.  There is spondylotic disease with decreased disc height and osteophyte formation noted.  No fractures or listhesis noted.  No instability on flexion-extension views.    Impression:  Spondylotic changes of the lumbar spine as noted above

## 2024-06-17 NOTE — PROGRESS NOTES
MDM/time:  45-50 minutes spent on this encounter including 15 minutes reviewing imaging and notes, 20 minutes with the patient, 10 minutes documentation    ASSESSMENT:  83 y.o. male with Lumbar spondylosis with radiculopathy, bilateral hip arthritis right worse than left.    PLAN:  PT lumbar spine   Neurontin 300mg 1 tab TID       HPI:  83 y.o. male here for evaluation of left sided lower back pain that radiates into the left buttock and down the left hamstring area.  Patient reports pain increases with walking distances.  No difficulty with  strength.  No balance issues or falls.  Denies bladder bowel incontinence.  No difficulty walking distances.  Currently has difficulty walking distances due to pain.  Takes Aleve as needed for pain.  No recent physical therapy.  No prior pain management.  No recent MRI.  No prior spine surgery.  Patient is not a smoker.  Patient currently takes Plavix.  Has had a left knee arthroscopy for a meniscus tear 02/21/2023.    IMAGING:  AP, lateral, flexion/extension views of the lumbar spine reviewed     On the AP there is lumbar curvature to the right.  There are 5 non-rib-bearing lumbar vertebrae.  On the lateral there is decreased lumbar lordosis.  There is spondylotic disease with decreased disc height and osteophyte formation noted.  No fractures or listhesis noted.  No instability on flexion-extension views.     Impression:  Spondylotic changes of the lumbar spine as noted above       Past Medical History:   Diagnosis Date    Abnormal echocardiogram 11/10/2022    Anticoagulant long-term use     Benign prostate hyperplasia     Coronary artery disease involving native coronary artery of native heart with angina pectoris     COVID-19 02/10/2022    GERD (gastroesophageal reflux disease)     Hyperlipidemia     Hypertension     Osteoarthritis     Stroke      Past Surgical History:   Procedure Laterality Date    ARTHROSCOPY OF KNEE Left 2/21/2023    Procedure: ARTHROSCOPY,  KNEE;  Surgeon: Garrett Shipley MD;  Location: [facility] ORTHO OR;  Service: Orthopedics;  Laterality: Left;    CARTILAGE-SHAVING Left 2/21/2023    Procedure: CARTILAGE-SHAVING;  Surgeon: Garrett Shipley MD;  Location: [facility] OR;  Service: Orthopedics;  Laterality: Left;  shaving of medial femoral condyle    KNEE ARTHROSCOPY W/ MENISCECTOMY Left 2/21/2023    Procedure: ARTHROSCOPY, KNEE, WITH MENISCECTOMY;  Surgeon: Garrett Shipley MD;  Location: [facility] ORTHO OR;  Service: Orthopedics;  Laterality: Left;  partial medial and lateral menisectomy      LEFT HEART CATHETERIZATION Right 11/15/2022    Procedure: Left heart cath;  Surgeon: Roby Zapata MD;  Location: [facility] CATH LAB;  Service: Cardiology;  Laterality: Right;  right wrist    TONSILLECTOMY      TOTAL KNEE ARTHROPLASTY Right      Social History     Tobacco Use    Smoking status: Never     Passive exposure: Never    Smokeless tobacco: Never   Substance Use Topics    Alcohol use: Not Currently    Drug use: Never      Current Outpatient Medications   Medication Instructions    amLODIPine (NORVASC) 5 mg, Oral, Daily    ascorbic acid (vitamin C) (VITAMIN C) 500 mg, Oral, Daily    aspirin (ECOTRIN) 81 mg, Oral, Daily    clopidogreL (PLAVIX) 75 mg, Oral, Daily    fenofibrate 160 mg, Oral, Daily    losartan-hydrochlorothiazide 100-12.5 mg (HYZAAR) 100-12.5 mg Tab Take 1/2 tablet by mouth daily for HTN.    metoprolol succinate (TOPROL-XL) 25 mg, Oral, Daily    nitroGLYCERIN (NITROSTAT) 0.4 mg, Sublingual, Every 5 min PRN    omega-3 fatty acids/fish oil (FISH OIL-OMEGA-3 FATTY ACIDS) 300-1,000 mg capsule 2 capsules, Oral, 2 times daily    pantoprazole (PROTONIX) 40 mg, Oral, Daily    POTASSIUM GLUCONATE ORAL Oral, Daily    tamsulosin (FLOMAX) 0.4 mg, Oral, Daily    triamcinolone acetonide 0.1% (KENALOG) 0.1 % ointment Topical (Top), 2 times daily        EXAM:  Constitutional  General Appearance:  There is no height or weight on file to  calculate BMI., NAD  Psychiatric   Orientation: Oriented to time, oriented to place, oriented to person  Mood and Affect: Active and alert, normal mood, normal affect  Gait and Station   Appearance:  Antalgic gait to the left, normal tandem gait, able to walk on toes, unable to walk on heels    LUMBAR  Musculoskeletal System   Hips: Normal appearance, no leg length discrepancy, decreased motion; left, decreased motion; right    Lumbar Spine                   Inspection:  Normal alignment, normal sagittal balance                  Range of motion: decreased flexion, extension, lateral bending, rotation. Pain with range of motion                  Bony Palpation of the Lumbar Spine:  No tenderness of the spinous process, no tenderness of the sacrum, no tenderness of the coccyx                  Bony Palpation of the Right Hip:  No tenderness of the iliac crest, no tenderness of the sciatic notch, no tenderness of the SI joint                  Bony Palpation of the Left Hip:  No tenderness of the iliac crest, no tenderness of the sciatic notch, no tenderness of the SI joint                  Soft Tissue Palpation on the Right:  No tenderness of the paraspinal region, no tenderness of the iliolumbar region                  Soft Tissue Palpation on the Left:  No tenderness of the paraspinal region, no tenderness of the iliolumbar region    Motor Strength   L1 Right:  Hip flexion iliopsoas 5/5    L1 Left:  Hip flexion iliopsoas 4/5              L2-L4 Right:  Knee extension quadriceps 5/5, tibialis anterior 5/5              L2-L4 Left:  Knee extension quadriceps 5/5, tibialis anterior 5/5   L5 Right:  Extensor hallucis llongus 5/5,    L5 Left:  Extensor hallucis longus 5/5,    S1 Right:  Plantar flexion gastrocnemius 5/5   S1 Left:  Plantar flexion gastrocnemius 5/5    Neurological System   Ankle Reflex Right:  normal   Ankle Reflex Left: normal   Knee Reflex Right:  normal   Knee Reflex Left:  normal   Sensation on the Right:   L2 normal, L3 normal, L4 normal, L5 normal, S1 normal   Sensation on the Left:  L2 normal, L3 normal, L4 normal, L5 normal, S1 normal              Special Test on the Right:  Seated straight leg raising test negative, no clonus of the ankle              Special Test on the Left:  Seated straight leg raising test negative, no clonus of the ankle    Skin   Lumbosacral Spine:  Normal skin    Cardiovascular System   Arterial Pulses Right:  Posterior tibialis normal, dorsalis pedis normal   Arterial Pulses Left:  Posterior tibialis normal, dorsalis pedis normal   Edema Right: None   Edema Left:  None

## 2024-07-09 DIAGNOSIS — Z71.89 COMPLEX CARE COORDINATION: ICD-10-CM

## 2024-08-12 ENCOUNTER — OFFICE VISIT (OUTPATIENT)
Dept: FAMILY MEDICINE | Facility: CLINIC | Age: 84
End: 2024-08-12
Payer: MEDICARE

## 2024-08-12 ENCOUNTER — APPOINTMENT (OUTPATIENT)
Dept: RADIOLOGY | Facility: CLINIC | Age: 84
End: 2024-08-12
Attending: NURSE PRACTITIONER
Payer: MEDICARE

## 2024-08-12 VITALS
BODY MASS INDEX: 31.32 KG/M2 | DIASTOLIC BLOOD PRESSURE: 74 MMHG | WEIGHT: 218.81 LBS | OXYGEN SATURATION: 97 % | TEMPERATURE: 98 F | HEART RATE: 60 BPM | SYSTOLIC BLOOD PRESSURE: 142 MMHG | HEIGHT: 70 IN | RESPIRATION RATE: 18 BRPM

## 2024-08-12 DIAGNOSIS — E78.2 MIXED HYPERLIPIDEMIA: Chronic | ICD-10-CM

## 2024-08-12 DIAGNOSIS — K21.9 GASTROESOPHAGEAL REFLUX DISEASE WITHOUT ESOPHAGITIS: Chronic | ICD-10-CM

## 2024-08-12 DIAGNOSIS — R05.2 SUBACUTE COUGH: ICD-10-CM

## 2024-08-12 DIAGNOSIS — I10 ESSENTIAL HYPERTENSION: Primary | Chronic | ICD-10-CM

## 2024-08-12 DIAGNOSIS — R35.0 URINARY FREQUENCY: ICD-10-CM

## 2024-08-12 LAB
BILIRUB SERPL-MCNC: NEGATIVE MG/DL
BLOOD URINE, POC: ABNORMAL
CLARITY, POC UA: CLEAR
COLOR, POC UA: YELLOW
GLUCOSE UR QL STRIP: NEGATIVE
KETONES UR QL STRIP: NEGATIVE
LEUKOCYTE ESTERASE URINE, POC: NEGATIVE
NITRITE, POC UA: NEGATIVE
PH, POC UA: 6
PROTEIN, POC: NEGATIVE
SPECIFIC GRAVITY, POC UA: 1
UROBILINOGEN, POC UA: 0.2

## 2024-08-12 PROCEDURE — 99214 OFFICE O/P EST MOD 30 MIN: CPT | Mod: ,,, | Performed by: NURSE PRACTITIONER

## 2024-08-12 PROCEDURE — 81003 URINALYSIS AUTO W/O SCOPE: CPT | Mod: RHCUB | Performed by: NURSE PRACTITIONER

## 2024-08-12 PROCEDURE — 71046 X-RAY EXAM CHEST 2 VIEWS: CPT | Mod: TC,RHCUB,FY | Performed by: NURSE PRACTITIONER

## 2024-08-12 RX ORDER — LOSARTAN POTASSIUM AND HYDROCHLOROTHIAZIDE 12.5; 1 MG/1; MG/1
1 TABLET ORAL DAILY
Qty: 90 TABLET | Refills: 3 | Status: SHIPPED | OUTPATIENT
Start: 2024-08-12 | End: 2024-08-12

## 2024-08-12 RX ORDER — PANTOPRAZOLE SODIUM 40 MG/1
40 TABLET, DELAYED RELEASE ORAL DAILY
Qty: 90 TABLET | Refills: 3 | Status: SHIPPED | OUTPATIENT
Start: 2024-08-12 | End: 2025-08-12

## 2024-08-12 RX ORDER — LOSARTAN POTASSIUM AND HYDROCHLOROTHIAZIDE 12.5; 1 MG/1; MG/1
1 TABLET ORAL DAILY
Qty: 90 TABLET | Refills: 3 | Status: SHIPPED | OUTPATIENT
Start: 2024-08-12

## 2024-08-12 RX ORDER — AMLODIPINE BESYLATE 5 MG/1
5 TABLET ORAL DAILY
Qty: 90 TABLET | Refills: 3 | Status: SHIPPED | OUTPATIENT
Start: 2024-08-12

## 2024-08-12 RX ORDER — LOSARTAN POTASSIUM AND HYDROCHLOROTHIAZIDE 12.5; 1 MG/1; MG/1
TABLET ORAL
Qty: 45 TABLET | Refills: 3 | Status: SHIPPED | OUTPATIENT
Start: 2024-08-12 | End: 2024-08-12

## 2024-08-12 NOTE — PROGRESS NOTES
"[facility] MEDICINE       PATIENT NAME: Cj Thomas   : 1940    AGE: 83 y.o. DATE OF ENCOUNTER: 24    MRN: [id number]      PCP: Samantha Araujo FNP    Subjective:     Reason for Visit / Chief Complaint:     274}  Chief Complaint   Patient presents with    Hypertension     Patient reports to the clinic today for 6 month follow up.     Health Maintenance     Care gaps addressed, patient declines RSV vaccine.    Areli Luong, [facility]    Follow-up     Patient states he is having increased memory issues and has developed a cough over the past few months.     Presents with wife for 6 mth f/u HTN.    Cough x 2 mths, states has a tickle in his throat.  Denies SOB or wheezing.    Reports he has only been taking protonix when he has indigestion.   Stopped taking aspirin 81 mg because of bruising.  Takes aleve prn hip pain.    Recent urinary hesitancy and dark yellow urine until today.  Trying to drink more water.     Review of Systems:     Review of Systems   Constitutional: Negative.    HENT: Negative.     Eyes: Negative.    Respiratory:  Positive for cough. Negative for shortness of breath and wheezing.    Cardiovascular: Negative.  Negative for chest pain and palpitations.   Gastrointestinal: Negative.  Negative for abdominal pain, blood in stool, constipation, diarrhea, nausea and vomiting.   Endocrine: Negative.    Genitourinary: Negative.    Musculoskeletal:  Positive for arthralgias.   Skin: Negative.    Allergic/Immunologic: Negative.    Neurological: Negative.    Hematological: Negative.    Psychiatric/Behavioral: Negative.         Allergies and Meds: 274}     Review of patient's allergies indicates:   Allergen Reactions    Niacin Swelling     "Not sure"    Demerol [meperidine] Other (See Comments)     "Forgets everything"    Iodine and iodide containing products     Statins-hmg-coa reductase inhibitors      Abnormal behavior. Body aches    Iodinated contrast media Rash " and Other (See Comments)     Passes out after    Iodine Nausea Only        Current Outpatient Medications   Medication Sig Dispense Refill    clopidogreL (PLAVIX) 75 mg tablet Take 1 tablet (75 mg total) by mouth once daily. 90 tablet 3    metoprolol succinate (TOPROL-XL) 25 MG 24 hr tablet Take 1 tablet (25 mg total) by mouth once daily. 90 tablet 3    nitroGLYCERIN (NITROSTAT) 0.4 MG SL tablet Place 1 tablet (0.4 mg total) under the tongue every 5 (five) minutes as needed for Chest pain. 25 tablet 3    omega-3 fatty acids/fish oil (FISH OIL-OMEGA-3 FATTY ACIDS) 300-1,000 mg capsule Take 2 capsules by mouth 2 (two) times a day.       POTASSIUM GLUCONATE ORAL Take by mouth Daily.      tamsulosin (FLOMAX) 0.4 mg Cap Take 1 capsule (0.4 mg total) by mouth once daily. 90 capsule 3    amLODIPine (NORVASC) 5 MG tablet Take 1 tablet (5 mg total) by mouth once daily. 90 tablet 3    losartan-hydrochlorothiazide 100-12.5 mg (HYZAAR) 100-12.5 mg Tab Take 1 tablet by mouth once daily. for HTN 90 tablet 3    pantoprazole (PROTONIX) 40 MG tablet Take 1 tablet (40 mg total) by mouth once daily. 90 tablet 3     No current facility-administered medications for this visit.       Labs:274}   I have reviewed labs below:    Lab Results   Component Value Date    WBC 7.59 01/09/2024    RBC 4.74 01/09/2024    HGB 14.9 01/09/2024    HCT 43.1 01/09/2024     01/09/2024     01/09/2024    K 4.7 01/09/2024     01/09/2024    CALCIUM 9.3 01/09/2024    GLU 83 01/09/2024    BUN 13 01/09/2024    CREATININE 1.09 01/09/2024    EGFRNONAA 86 04/18/2022    ALT 21 03/28/2023    AST 12 (L) 04/18/2022    CHOL 124 03/28/2023    TRIG 108 03/28/2023    HDL 49 03/28/2023    LDLCALC 138 01/09/2024    TSH 1.430 01/09/2024     Medical History: 274}     Past Medical History:   Diagnosis Date    Abnormal echocardiogram 11/10/2022    Anticoagulant long-term use     Benign prostate hyperplasia     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     COVID-19 02/10/2022    GERD (gastroesophageal reflux disease)     Hyperlipidemia     Hypertension     Osteoarthritis     Stroke       Social History     Tobacco Use   Smoking Status Never    Passive exposure: Never   Smokeless Tobacco Never      Past Surgical History:   Procedure Laterality Date    ARTHROSCOPY OF KNEE Left 2/21/2023    Procedure: ARTHROSCOPY, KNEE;  Surgeon: Garrett Shipley MD;  Location: [facility] OR;  Service: Orthopedics;  Laterality: Left;    CARTILAGE-SHAVING Left 2/21/2023    Procedure: CARTILAGE-SHAVING;  Surgeon: Garrett Shipley MD;  Location: [facility] OR;  Service: Orthopedics;  Laterality: Left;  shaving of medial femoral condyle    KNEE ARTHROSCOPY W/ MENISCECTOMY Left 2/21/2023    Procedure: ARTHROSCOPY, KNEE, WITH MENISCECTOMY;  Surgeon: Garrett Shipley MD;  Location: [facility] OR;  Service: Orthopedics;  Laterality: Left;  partial medial and lateral menisectomy      LEFT HEART CATHETERIZATION Right 11/15/2022    Procedure: Left heart cath;  Surgeon: Roby Zapata MD;  Location: [facility] CATH LAB;  Service: Cardiology;  Laterality: Right;  right wrist    TONSILLECTOMY      TOTAL KNEE ARTHROPLASTY Right         Health Maintenance:      Health Maintenance Topics with due status: Not Due       Topic Last Completion Date    TETANUS VACCINE 09/07/2017    Influenza Vaccine 11/02/2018    Lipid Panel 01/09/2024    Aspirin/Antiplatelet Therapy 08/12/2024       Objective:  274}   Vital Signs  Temp: 97.9 °F (36.6 °C)  Temp Source: Oral  Pulse: 60  Resp: 18  SpO2: 97 %  BP: (!) 142/74  BP Location: Right arm  Patient Position: Sitting  Pain Score: 0-No pain  Height and Weight  Height: 5' 10" (177.8 cm)  Weight: 99.2 kg (218 lb 12.8 oz)  BSA (Calculated - sq m): 2.21 sq meters  BMI (Calculated): 31.4  Weight in (lb) to have BMI = 25: 173.9    Over the last two weeks how often have you been bothered by little " interest or pleasure in doing things: 0  Over the last two weeks how often have you been bothered by feeling down, depressed or hopeless: 0  PHQ-2 Total Score: 0  PHQ-9 Score: 0  PHQ-9 Interpretation: Minimal or None    Wt Readings from Last 3 Encounters:   08/12/24 99.2 kg (218 lb 12.8 oz)   04/22/24 98 kg (216 lb)   02/08/24 98 kg (216 lb)     Physical Exam  Vitals and nursing note reviewed.   Constitutional:       General: He is not in acute distress.     Appearance: Normal appearance. He is not ill-appearing.   HENT:      Head: Normocephalic.      Right Ear: Tympanic membrane, ear canal and external ear normal.      Left Ear: Tympanic membrane, ear canal and external ear normal.      Nose: Nose normal.      Mouth/Throat:      Mouth: Mucous membranes are moist.      Pharynx: Oropharynx is clear. Uvula midline. No posterior oropharyngeal erythema or uvula swelling.   Eyes:      Conjunctiva/sclera: Conjunctivae normal.      Pupils: Pupils are equal, round, and reactive to light.   Neck:      Thyroid: No thyromegaly.      Vascular: Normal carotid pulses. No carotid bruit.   Cardiovascular:      Rate and Rhythm: Normal rate and regular rhythm.      Pulses: Normal pulses.      Heart sounds: Normal heart sounds.   Pulmonary:      Effort: Pulmonary effort is normal. No respiratory distress.      Breath sounds: Normal breath sounds. No wheezing, rhonchi or rales.   Musculoskeletal:      Cervical back: Neck supple.      Right lower leg: No edema.      Left lower leg: Edema (trace non-pitting left pedal and ankle) present.   Lymphadenopathy:      Cervical: No cervical adenopathy.   Skin:     General: Skin is warm and dry.      Capillary Refill: Capillary refill takes less than 2 seconds.      Coloration: Skin is not jaundiced or pale.   Neurological:      General: No focal deficit present.      Mental Status: He is alert and oriented to person, place, and time.      Gait: Gait normal.   Psychiatric:         Mood and  Affect: Mood normal.         Behavior: Behavior normal.          Assessment and Plan: 274}     1. Essential hypertension  Assessment & Plan:  Sub-optimal control  Continue losartan hctz 100/12.5 mg and increase from 1/2 tablet daily to a whole tablet.  Continue amlodipine 5 mg daily.    Orders:  -     amLODIPine (NORVASC) 5 MG tablet; Take 1 tablet (5 mg total) by mouth once daily.  Dispense: 90 tablet; Refill: 3  -     Discontinue: losartan-hydrochlorothiazide 100-12.5 mg (HYZAAR) 100-12.5 mg Tab; Take 1/2 tablet by mouth daily for HTN.  Dispense: 45 tablet; Refill: 3  -     Discontinue: losartan-hydrochlorothiazide 100-12.5 mg (HYZAAR) 100-12.5 mg Tab; Take 1 tablet by mouth once daily. Take 1/2 tablet by mouth daily for HTN.  Dispense: 90 tablet; Refill: 3  -     losartan-hydrochlorothiazide 100-12.5 mg (HYZAAR) 100-12.5 mg Tab; Take 1 tablet by mouth once daily. for HTN  Dispense: 90 tablet; Refill: 3    2. Urinary frequency  -     POCT URINALYSIS W/O SCOPE    3. Gastroesophageal reflux disease without esophagitis  Assessment & Plan:  Not taking protonix daily as prescribed.  Advised silent reflux can cause cough and advised to resume daily (not just prn reflux).    Orders:  -     pantoprazole (PROTONIX) 40 MG tablet; Take 1 tablet (40 mg total) by mouth once daily.  Dispense: 90 tablet; Refill: 3    4. Subacute cough  -     X-Ray Chest PA And Lateral; Future; Expected date: 08/12/2024    5. Mixed hyperlipidemia  Assessment & Plan:  Lab Results   Component Value Date    CHOL 124 03/28/2023    CHOL 209 (H) 10/25/2022    CHOL 240 (H) 04/18/2022     Lab Results   Component Value Date    HDL 49 03/28/2023    HDL 45 10/25/2022    HDL 43 04/18/2022     Lab Results   Component Value Date    LDLCALC 138 01/09/2024    LDLCALC 53 03/28/2023    LDLCALC 132 10/25/2022     Lab Results   Component Value Date    TRIG 108 03/28/2023    TRIG 159 (H) 10/25/2022    TRIG 187 (H) 04/18/2022       Lab Results   Component Value Date     CHOLHDL 2.5 03/28/2023    CHOLHDL 4.6 10/25/2022    CHOLHDL 5.6 04/18/2022     Refuses to take medication.    Has statin induced myalgias and will not take fenofibrate.        Diagnosis, risks, benefits, and side effects of any meds and treatment plan were discussed with the patient.  Patient to call or follow-up with any new or worsening symptoms or problems prior to next appointment.  Go to ER for any urgent complications.  All questions were answered to the satisfaction of the patient, and pt verbalized understanding and agreement to treatment plan.      Follow up in about 6 months (around 2/12/2025) for hypertension, with fasting labs.    Signature:  AR Friedman-BC    Future Appointments   Date Time Provider Department Center   9/19/2024 10:00 AM Maximo Lima MD [facility] SPINE [facility]   9/23/2024 10:45 AM Roby Zapata MD [facility] CARD [facility]   2/13/2025 11:00 AM AWV NURSE, [facility] FAMILY MEDICINE [facility]   2/17/2025 10:20 AM Samantha Araujo FNP [facility]

## 2024-08-12 NOTE — ASSESSMENT & PLAN NOTE
Sub-optimal control  Continue losartan hctz 100/12.5 mg and increase from 1/2 tablet daily to a whole tablet.  Continue amlodipine 5 mg daily.

## 2024-08-12 NOTE — PROGRESS NOTES
Call pt and review results.  Chronic changes but no fluid or pneumonia.  Resume Protonix and take daily as advised as we discussed that is silent GERD can cause a cough.

## 2024-08-13 NOTE — ASSESSMENT & PLAN NOTE
Not taking protonix daily as prescribed.  Advised silent reflux can cause cough and advised to resume daily (not just prn reflux).

## 2024-08-13 NOTE — PROGRESS NOTES
Call pt and review results.  Urine is okay aside from a notation of trace blood.  If he has continued issues I recommend we get a ultrasound or CT for further evaluation.  His urinary symptoms were improved as of yesterday.

## 2024-08-13 NOTE — ASSESSMENT & PLAN NOTE
Lab Results   Component Value Date    CHOL 124 03/28/2023    CHOL 209 (H) 10/25/2022    CHOL 240 (H) 04/18/2022     Lab Results   Component Value Date    HDL 49 03/28/2023    HDL 45 10/25/2022    HDL 43 04/18/2022     Lab Results   Component Value Date    LDLCALC 138 01/09/2024    LDLCALC 53 03/28/2023    LDLCALC 132 10/25/2022     Lab Results   Component Value Date    TRIG 108 03/28/2023    TRIG 159 (H) 10/25/2022    TRIG 187 (H) 04/18/2022       Lab Results   Component Value Date    CHOLHDL 2.5 03/28/2023    CHOLHDL 4.6 10/25/2022    CHOLHDL 5.6 04/18/2022     Refuses to take medication.    Has statin induced myalgias and will not take fenofibrate.

## 2024-09-19 ENCOUNTER — OFFICE VISIT (OUTPATIENT)
Dept: SPINE | Facility: CLINIC | Age: 84
End: 2024-09-19
Payer: MEDICARE

## 2024-09-19 DIAGNOSIS — M54.16 LUMBAR RADICULOPATHY: ICD-10-CM

## 2024-09-19 DIAGNOSIS — M54.16 LUMBAR RADICULOPATHY, CHRONIC: Primary | ICD-10-CM

## 2024-09-19 DIAGNOSIS — M51.36 DDD (DEGENERATIVE DISC DISEASE), LUMBAR: ICD-10-CM

## 2024-09-19 PROCEDURE — 99214 OFFICE O/P EST MOD 30 MIN: CPT | Mod: S$PBB,,, | Performed by: ORTHOPAEDIC SURGERY

## 2024-09-19 PROCEDURE — 99999 PR PBB SHADOW E&M-EST. PATIENT-LVL III: CPT | Mod: PBBFAC,,, | Performed by: ORTHOPAEDIC SURGERY

## 2024-09-19 PROCEDURE — 99213 OFFICE O/P EST LOW 20 MIN: CPT | Mod: PBBFAC | Performed by: ORTHOPAEDIC SURGERY

## 2024-09-19 RX ORDER — ALPRAZOLAM 1 MG/1
1 TABLET ORAL SEE ADMIN INSTRUCTIONS
Qty: 1 TABLET | Refills: 0 | Status: SHIPPED | OUTPATIENT
Start: 2024-09-19 | End: 2024-09-20

## 2024-09-19 NOTE — PROGRESS NOTES
MDM/time:  30-35 minutes spent on this encounter including 10 minutes reviewing imaging and notes, 15 minutes with the patient, 5 minutes documentation    ASSESSMENT:  83 y.o. male with Lumbar spondylosis with radiculopathy, bilateral hip arthritis right worse than left.    PLAN:  MRI lumbar spine    HPI:  83 y.o. male here for repeat evaluation of left sided lower back pain that radiates into the left buttock and down the left hamstring area.  Reports his pain is about the same.  He completed physical therapy from June to August 20, 2024 which seemed to help some while he was doing it.  Patient reports pain increases with walking distances.  No difficulty with  strength.  No balance issues or falls.  Denies bladder bowel incontinence.  No difficulty walking distances.  Currently has difficulty walking distances due to pain.  Takes Aleve as needed for pain.  No prior pain management.  No recent MRI.  No prior spine surgery.  Patient is not a smoker.  Patient currently takes Plavix.  Has had a left knee arthroscopy for a meniscus tear 02/21/2023.    IMAGING:  X-rays lumbar spine reviewed show:    On the AP there is lumbar curvature to the right.  There are 5 non-rib-bearing lumbar vertebrae.  On the lateral there is decreased lumbar lordosis.  There is spondylotic disease with decreased disc height and osteophyte formation noted.  No fractures or listhesis noted.  No instability on flexion-extension views.      Past Medical History:   Diagnosis Date    Abnormal echocardiogram 11/10/2022    Anticoagulant long-term use     Benign prostate hyperplasia     Coronary artery disease involving native coronary artery of native heart with angina pectoris     COVID-19 02/10/2022    GERD (gastroesophageal reflux disease)     Hyperlipidemia     Hypertension     Osteoarthritis     Stroke      Past Surgical History:   Procedure Laterality Date    ARTHROSCOPY OF KNEE Left 2/21/2023    Procedure: ARTHROSCOPY, KNEE;  Surgeon:  Garrett Shipley MD;  Location: [facility] ORTHO OR;  Service: Orthopedics;  Laterality: Left;    CARTILAGE-SHAVING Left 2/21/2023    Procedure: CARTILAGE-SHAVING;  Surgeon: Garrett Shipley MD;  Location: [facility] ORTHO OR;  Service: Orthopedics;  Laterality: Left;  shaving of medial femoral condyle    KNEE ARTHROSCOPY W/ MENISCECTOMY Left 2/21/2023    Procedure: ARTHROSCOPY, KNEE, WITH MENISCECTOMY;  Surgeon: Garrett Shipley MD;  Location: [facility] ORTHO OR;  Service: Orthopedics;  Laterality: Left;  partial medial and lateral menisectomy      LEFT HEART CATHETERIZATION Right 11/15/2022    Procedure: Left heart cath;  Surgeon: Roby Zapata MD;  Location: [facility] CATH LAB;  Service: Cardiology;  Laterality: Right;  right wrist    TONSILLECTOMY      TOTAL KNEE ARTHROPLASTY Right      Social History     Tobacco Use    Smoking status: Never     Passive exposure: Never    Smokeless tobacco: Never   Substance Use Topics    Alcohol use: Not Currently    Drug use: Never      Current Outpatient Medications   Medication Instructions    amLODIPine (NORVASC) 5 mg, Oral, Daily    clopidogreL (PLAVIX) 75 mg, Oral, Daily    losartan-hydrochlorothiazide 100-12.5 mg (HYZAAR) 100-12.5 mg Tab 1 tablet, Oral, Daily, for HTN    metoprolol succinate (TOPROL-XL) 25 mg, Oral, Daily    nitroGLYCERIN (NITROSTAT) 0.4 mg, Sublingual, Every 5 min PRN    omega-3 fatty acids/fish oil (FISH OIL-OMEGA-3 FATTY ACIDS) 300-1,000 mg capsule 2 capsules, Oral, 2 times daily    pantoprazole (PROTONIX) 40 mg, Oral, Daily    POTASSIUM GLUCONATE ORAL Oral, Daily    tamsulosin (FLOMAX) 0.4 mg, Oral, Daily        EXAM:  Constitutional  General Appearance:  There is no height or weight on file to calculate BMI., NAD  Psychiatric   Orientation: Oriented to time, oriented to place, oriented to person  Mood and Affect: Active and alert, normal mood, normal affect  Gait and Station   Appearance:  Antalgic gait to the left, normal tandem gait,  able to walk on toes, unable to walk on heels    LUMBAR  Musculoskeletal System   Hips: Normal appearance, no leg length discrepancy, decreased motion; left, decreased motion; right    Lumbar Spine                   Inspection:  Normal alignment, normal sagittal balance                  Range of motion: decreased flexion, extension, lateral bending, rotation. Pain with range of motion                  Bony Palpation of the Lumbar Spine:  No tenderness of the spinous process, no tenderness of the sacrum, no tenderness of the coccyx                  Bony Palpation of the Right Hip:  No tenderness of the iliac crest, no tenderness of the sciatic notch, no tenderness of the SI joint                  Bony Palpation of the Left Hip:  No tenderness of the iliac crest, no tenderness of the sciatic notch, no tenderness of the SI joint                  Soft Tissue Palpation on the Right:  No tenderness of the paraspinal region, no tenderness of the iliolumbar region                  Soft Tissue Palpation on the Left:  No tenderness of the paraspinal region, no tenderness of the iliolumbar region    Motor Strength   L1 Right:  Hip flexion iliopsoas 5/5    L1 Left:  Hip flexion iliopsoas 4/5              L2-L4 Right:  Knee extension quadriceps 5/5, tibialis anterior 5/5              L2-L4 Left:  Knee extension quadriceps 5/5, tibialis anterior 5/5   L5 Right:  Extensor hallucis llongus 5/5,    L5 Left:  Extensor hallucis longus 5/5,    S1 Right:  Plantar flexion gastrocnemius 5/5   S1 Left:  Plantar flexion gastrocnemius 5/5    Neurological System   Ankle Reflex Right:  normal   Ankle Reflex Left: normal   Knee Reflex Right:  normal   Knee Reflex Left:  normal   Sensation on the Right:  L2 normal, L3 normal, L4 normal, L5 normal, S1 normal   Sensation on the Left:  L2 normal, L3 normal, L4 normal, L5 normal, S1 normal              Special Test on the Right:  Seated straight leg raising test negative, no clonus of the ankle               Special Test on the Left:  Seated straight leg raising test negative, no clonus of the ankle    Skin   Lumbosacral Spine:  Normal skin    Cardiovascular System   Arterial Pulses Right:  Posterior tibialis normal, dorsalis pedis normal   Arterial Pulses Left:  Posterior tibialis normal, dorsalis pedis normal   Edema Right: None   Edema Left:  None

## 2024-09-19 NOTE — PATIENT INSTRUCTIONS
Your MRI is scheduled for  10/03 @ 10:30AM at [facility].   The address is : [address] [place] [address]  The phone number is [phone]

## 2024-09-23 ENCOUNTER — OFFICE VISIT (OUTPATIENT)
Dept: CARDIOLOGY | Facility: CLINIC | Age: 84
End: 2024-09-23
Payer: MEDICARE

## 2024-09-23 VITALS
WEIGHT: 219.38 LBS | OXYGEN SATURATION: 97 % | SYSTOLIC BLOOD PRESSURE: 110 MMHG | HEIGHT: 69 IN | DIASTOLIC BLOOD PRESSURE: 70 MMHG | HEART RATE: 50 BPM | BODY MASS INDEX: 32.49 KG/M2

## 2024-09-23 DIAGNOSIS — I10 ESSENTIAL HYPERTENSION: Chronic | ICD-10-CM

## 2024-09-23 DIAGNOSIS — I25.83 CORONARY ARTERY DISEASE DUE TO LIPID RICH PLAQUE: Primary | ICD-10-CM

## 2024-09-23 DIAGNOSIS — I25.10 CORONARY ARTERY DISEASE DUE TO LIPID RICH PLAQUE: Primary | ICD-10-CM

## 2024-09-23 DIAGNOSIS — E78.2 MIXED HYPERLIPIDEMIA: Chronic | ICD-10-CM

## 2024-09-23 DIAGNOSIS — K21.9 GASTROESOPHAGEAL REFLUX DISEASE WITHOUT ESOPHAGITIS: Chronic | ICD-10-CM

## 2024-09-23 PROCEDURE — 99214 OFFICE O/P EST MOD 30 MIN: CPT | Mod: S$PBB,,, | Performed by: INTERNAL MEDICINE

## 2024-09-23 PROCEDURE — 93010 ELECTROCARDIOGRAM REPORT: CPT | Mod: S$PBB,,, | Performed by: INTERNAL MEDICINE

## 2024-09-23 PROCEDURE — 99999 PR PBB SHADOW E&M-EST. PATIENT-LVL III: CPT | Mod: PBBFAC,,, | Performed by: INTERNAL MEDICINE

## 2024-09-23 PROCEDURE — 93005 ELECTROCARDIOGRAM TRACING: CPT | Mod: PBBFAC | Performed by: INTERNAL MEDICINE

## 2024-09-23 PROCEDURE — 99213 OFFICE O/P EST LOW 20 MIN: CPT | Mod: PBBFAC,25 | Performed by: INTERNAL MEDICINE

## 2024-09-24 LAB
OHS QRS DURATION: 90 MS
OHS QTC CALCULATION: 445 MS

## 2024-09-24 RX ORDER — EPINEPHRINE 0.3 MG/.3ML
0.3 INJECTION SUBCUTANEOUS ONCE AS NEEDED
OUTPATIENT
Start: 2024-10-01

## 2024-09-24 RX ORDER — ACETAMINOPHEN 325 MG/1
650 TABLET ORAL EVERY 4 HOURS PRN
OUTPATIENT
Start: 2024-10-01

## 2024-09-24 RX ORDER — METHYLPREDNISOLONE SOD SUCC 125 MG
125 VIAL (EA) INJECTION ONCE AS NEEDED
OUTPATIENT
Start: 2024-10-01

## 2024-09-24 RX ORDER — DIPHENHYDRAMINE HYDROCHLORIDE 50 MG/ML
50 INJECTION INTRAMUSCULAR; INTRAVENOUS ONCE AS NEEDED
OUTPATIENT
Start: 2024-10-01

## 2024-09-24 RX ORDER — METHYLPREDNISOLONE SOD SUCC 125 MG
125 VIAL (EA) INJECTION
OUTPATIENT
Start: 2024-10-01

## 2024-09-24 RX ORDER — ONDANSETRON HYDROCHLORIDE 2 MG/ML
8 INJECTION, SOLUTION INTRAVENOUS ONCE AS NEEDED
OUTPATIENT
Start: 2024-10-01

## 2024-09-24 RX ORDER — KETOROLAC TROMETHAMINE 15 MG/ML
15 INJECTION, SOLUTION INTRAMUSCULAR; INTRAVENOUS ONCE AS NEEDED
OUTPATIENT
Start: 2024-10-01

## 2024-09-25 ENCOUNTER — TELEPHONE (OUTPATIENT)
Dept: SPINE | Facility: CLINIC | Age: 84
End: 2024-09-25
Payer: MEDICARE

## 2024-09-25 NOTE — TELEPHONE ENCOUNTER
----- Message from Zoey Ayers sent at 9/25/2024  2:28 PM CDT -----  PTS WIFE CALLING TO CANCEL MRI. HE'S TAKING TYLENOL AND THAT'S HELPING. WIFE WILL CALL [facility] TO CANCEL. HE DOESN'T NEED A FOLLOW UP VISIT EITHER. JUST LETTING YOU KNOW.

## 2024-10-08 ENCOUNTER — TELEPHONE (OUTPATIENT)
Dept: INFUSION THERAPY | Facility: HOSPITAL | Age: 84
End: 2024-10-08
Payer: MEDICARE

## 2024-10-08 NOTE — TELEPHONE ENCOUNTER
Pt was on my schedule for the Leqvio injection for Friday 10/11/24.  Wife just called and states pt has changed his mind about getting the medication and asked that I let everyone know.  I will cancel the therapy plan. Dr Zapata made aware.

## 2024-10-11 NOTE — PROGRESS NOTES
PCP: Samantha Araujo FNP    Referring Provider:     Subjective:   Cj Thomas is a 84 y.o. male with hx of CAD, HTN, HLD, and CVA who presents for yearly follow up.       8/1/23--Cj Thomas is a 84 y.o. male with hx of CAD, HTN, HLD, and CVA who presents for 6 month follow up.         Fhx:  Family History   Problem Relation Name Age of Onset    Heart disease Mother      Heart disease Father      Hypertension Sister      Thyroid disease Daughter      Diabetes Son      No Known Problems Maternal Grandmother      No Known Problems Maternal Grandfather      Dementia Paternal Grandmother      No Known Problems Paternal Grandfather      Hypertension Sister      Hypertension Sister      Hypertension Sister      Hypertension Sister      No Known Problems Son       Shx:   Social History     Socioeconomic History    Marital status:     Number of children: 3   Tobacco Use    Smoking status: Never     Passive exposure: Never    Smokeless tobacco: Never   Substance and Sexual Activity    Alcohol use: Not Currently    Drug use: Never    Sexual activity: Not Currently     Social Drivers of Health     Financial Resource Strain: Medium Risk (2/8/2024)    Overall Financial Resource Strain (CARDIA)     Difficulty of Paying Living Expenses: Somewhat hard   Food Insecurity: No Food Insecurity (2/8/2024)    Hunger Vital Sign     Worried About Running Out of Food in the Last Year: Never true     Ran Out of Food in the Last Year: Never true   Transportation Needs: No Transportation Needs (2/8/2024)    PRAPARE - Transportation     Lack of Transportation (Medical): No     Lack of Transportation (Non-Medical): No   Physical Activity: Insufficiently Active (2/8/2024)    Exercise Vital Sign     Days of Exercise per Week: 5 days     Minutes of Exercise per Session: 20 min   Stress: No Stress Concern Present (2/8/2024)    [employer] - Occupational Stress Questionnaire     Feeling of Stress : Not at  all   Housing Stability: Low Risk  (2/8/2024)    Housing Stability Vital Sign     Unable to Pay for Housing in the Last Year: No     Number of Places Lived in the Last Year: 1     Unstable Housing in the Last Year: No       EKG   9/23/24--sinus bradycardia with marked sinus arrhythmia, nonspecific T wave abn. 58 bpm  8/1/23--NSR with sinus arrhythmia, nonspecific T wave abn, 60 bpm  12/27/2022(reviewed with Dr. Zapata) RSR with PAC's HR 65 bpm; anterolateral TWA    ECHO Results for orders placed during the hospital encounter of 10/28/22    Echo    Interpretation Summary  · The left ventricle is normal in size with concentric remodeling and low normal systolic function.  · The estimated ejection fraction is 50%.  · Left ventricular diastolic dysfunction.  · Normal right ventricular size with normal right ventricular systolic function.  · Mild mitral regurgitation.  · Mild tricuspid regurgitation.  · Mild pulmonic regurgitation.  · There are segmental left ventricular wall motion abnormalities.      LEXISCAN:  10/31/22--  1. Very large anterior and septal wall perfusion defect extending to the apex in stress images which partially improves in rest images and is consistent with an area of ischemia and scar.  2. Normal left ventricular size with hypokinesis of the distal 1/2 of the interventricular septum and overall mildly impaired left ventricular systolic function, ejection fraction 43%.    [facility] Results for orders placed during the hospital encounter of 11/15/22    Cardiac catheterization    Conclusion    The RPDA lesion was 50% stenosed.    The Mid LAD lesion was 100% stenosed.    The Prox RCA lesion was 70% stenosed with 0% stenosis post-intervention.    The Mid RCA lesion was 90% stenosed with 0% stenosis post-intervention.    The Ramus lesion was 75% stenosed.    The Prox Cx to Mid Cx lesion was 100% stenosed.    The ejection fraction was calculated to be 55%.    The left ventricular systolic function was  normal.    The left ventricular end diastolic pressure was elevated.    The pre-procedure left ventricular end diastolic pressure was 23.    A  at 20 MAUREEN for 14 sec.    The estimated blood loss was none.    There was three vessel coronary artery disease.    There was no mitral valve regurgitation.    The procedure log was documented by Documenter: Claude Jansen RN and verified by Roby Zapata MD.    Date: 11/15/2022  Time: 10:49 AM        Lab Results   Component Value Date     01/09/2024    K 4.7 01/09/2024     01/09/2024    CO2 30 01/09/2024    BUN 13 01/09/2024    CREATININE 1.09 01/09/2024    CALCIUM 9.3 01/09/2024    ANIONGAP 10 01/09/2024    EGFRNONAA 86 04/18/2022       Lab Results   Component Value Date    CHOL 124 03/28/2023    CHOL 209 (H) 10/25/2022    CHOL 240 (H) 04/18/2022     Lab Results   Component Value Date    HDL 49 03/28/2023    HDL 45 10/25/2022    HDL 43 04/18/2022     Lab Results   Component Value Date    LDLCALC 138 01/09/2024    LDLCALC 53 03/28/2023    LDLCALC 132 10/25/2022     Lab Results   Component Value Date    TRIG 108 03/28/2023    TRIG 159 (H) 10/25/2022    TRIG 187 (H) 04/18/2022     Lab Results   Component Value Date    CHOLHDL 2.5 03/28/2023    CHOLHDL 4.6 10/25/2022    CHOLHDL 5.6 04/18/2022       Lab Results   Component Value Date    WBC 7.59 01/09/2024    HGB 14.9 01/09/2024    HCT 43.1 01/09/2024    MCV 90.9 01/09/2024     01/09/2024           Current Outpatient Medications:     amLODIPine (NORVASC) 5 MG tablet, Take 1 tablet (5 mg total) by mouth once daily., Disp: 90 tablet, Rfl: 3    clopidogreL (PLAVIX) 75 mg tablet, Take 1 tablet (75 mg total) by mouth once daily., Disp: 90 tablet, Rfl: 3    losartan-hydrochlorothiazide 100-12.5 mg (HYZAAR) 100-12.5 mg Tab, Take 1 tablet by mouth once daily. for HTN, Disp: 90 tablet, Rfl: 3    metoprolol succinate (TOPROL-XL) 25 MG 24 hr tablet, Take 1 tablet (25 mg total) by mouth once daily., Disp: 90  "tablet, Rfl: 3    nitroGLYCERIN (NITROSTAT) 0.4 MG SL tablet, Place 1 tablet (0.4 mg total) under the tongue every 5 (five) minutes as needed for Chest pain., Disp: 25 tablet, Rfl: 3    omega-3 fatty acids/fish oil (FISH OIL-OMEGA-3 FATTY ACIDS) 300-1,000 mg capsule, Take 2 capsules by mouth 2 (two) times a day. , Disp: , Rfl:     pantoprazole (PROTONIX) 40 MG tablet, Take 1 tablet (40 mg total) by mouth once daily., Disp: 90 tablet, Rfl: 3    POTASSIUM GLUCONATE ORAL, Take by mouth Daily., Disp: , Rfl:     tamsulosin (FLOMAX) 0.4 mg Cap, Take 1 capsule (0.4 mg total) by mouth once daily., Disp: 90 capsule, Rfl: 3    ALPRAZolam (XANAX) 1 MG tablet, Take 1 tablet (1 mg total) by mouth As instructed for Anxiety (take 30 minutes prior to MRI)., Disp: 1 tablet, Rfl: 0    Review of Systems   Respiratory:  Negative for shortness of breath.    Cardiovascular:  Negative for chest pain, palpitations and leg swelling.   Neurological:  Negative for loss of consciousness.           Objective:   /70 (BP Location: Left arm, Patient Position: Sitting)   Pulse (!) 50   Ht 5' 9" (1.753 m)   Wt 99.5 kg (219 lb 6.4 oz)   SpO2 97%   BMI 32.40 kg/m²     Physical Exam  Vitals reviewed.   Constitutional:       General: He is not in acute distress.     Appearance: Normal appearance.   HENT:      Head: Normocephalic and atraumatic.   Neck:      Vascular: No carotid bruit or JVD.   Cardiovascular:      Rate and Rhythm: Normal rate and regular rhythm.      Pulses: Normal pulses.           Radial pulses are 2+ on the right side and 2+ on the left side.      Heart sounds: Normal heart sounds. No murmur heard.  Pulmonary:      Effort: Pulmonary effort is normal.      Breath sounds: Normal breath sounds.   Musculoskeletal:      Right lower leg: No edema.      Left lower leg: No edema.   Skin:     General: Skin is warm and dry.   Neurological:      Mental Status: He is alert.           Assessment:     1. Coronary artery disease due to " lipid rich plaque  EKG 12-lead    EKG 12-lead      2. Mixed hyperlipidemia        3. Essential hypertension        4. Gastroesophageal reflux disease without esophagitis              Plan:   Continue current medications.   Leqvio if insurance approves  Wegovy for wt loss, pt declines, will call if he changes his mind.   F/u in one year.

## 2025-01-15 ENCOUNTER — OFFICE VISIT (OUTPATIENT)
Dept: FAMILY MEDICINE | Facility: CLINIC | Age: 85
End: 2025-01-15
Payer: MEDICARE

## 2025-01-15 VITALS
OXYGEN SATURATION: 98 % | TEMPERATURE: 98 F | HEART RATE: 68 BPM | SYSTOLIC BLOOD PRESSURE: 130 MMHG | DIASTOLIC BLOOD PRESSURE: 68 MMHG | RESPIRATION RATE: 18 BRPM | WEIGHT: 211 LBS | HEIGHT: 69 IN | BODY MASS INDEX: 31.25 KG/M2

## 2025-01-15 DIAGNOSIS — I10 ESSENTIAL HYPERTENSION: Chronic | ICD-10-CM

## 2025-01-15 DIAGNOSIS — B34.9 VIRAL INFECTION: Primary | ICD-10-CM

## 2025-01-15 LAB
CTP QC/QA: YES
CTP QC/QA: YES
POC MOLECULAR INFLUENZA A AGN: NEGATIVE
POC MOLECULAR INFLUENZA B AGN: NEGATIVE
SARS-COV-2 RDRP RESP QL NAA+PROBE: NEGATIVE

## 2025-01-15 PROCEDURE — 87635 SARS-COV-2 COVID-19 AMP PRB: CPT | Mod: RHCUB | Performed by: NURSE PRACTITIONER

## 2025-01-15 PROCEDURE — 87502 INFLUENZA DNA AMP PROBE: CPT | Mod: RHCUB | Performed by: NURSE PRACTITIONER

## 2025-01-15 PROCEDURE — 99213 OFFICE O/P EST LOW 20 MIN: CPT | Mod: ,,, | Performed by: NURSE PRACTITIONER

## 2025-01-15 NOTE — PROGRESS NOTES
" [facility]  [address] [place] [address]  Phone: [phone]  Fax: [phone]       PATIENT NAME: Cj Thomas   : 1940    AGE: 84 y.o. DATE OF ENCOUNTER: 1/15/25    MRN: [id number]      PCP: Samantha Araujo FNP    Subjective:   CHANGE CHIEF COMPLAINT      :[id number]}274}  Chief Complaint   Patient presents with    Cough     Patient presents to clinic with complaints of coughing, vomiting, diarrhea and pain in rib cage when taking a deep breath.        History of Present Illness    HPI:  Patient's symptoms began early Tuesday morning (yesterday) around 2 AM with multiple episodes of vomiting, followed by diarrhea. He slept until 3 PM on Tuesday, with his wife providing oral hydration throughout the day. Patient has pain on both sides of his rib cage, extending down below his back, exacerbated by coughing. He reports a non-productive cough. The vomiting has subsided, with the last episode occurring yesterday. Patient has mild diarrhea today and a clear runny nose. He feels somewhat improved today compared to yesterday, attributing this to rest. Patient appears weak and lethargic. He had COVID approximately 1 year ago. Patient denies fever, significant coughing, shortness of breath, and stuffy nose.      ROS:  Constitutional: -fevers, +fatigue  ENT: -nasal congestion  Respiratory: +cough  Gastrointestinal: +nausea, +vomiting, +diarrhea  Musculoskeletal: +muscle pain  Neurological: +weakness         Allergies and Meds: 274}     Review of patient's allergies indicates:   Allergen Reactions    Niacin Swelling     "Not sure"    Demerol [meperidine] Other (See Comments)     "Forgets everything"    Iodine and iodide containing products     Statins-hmg-coa reductase inhibitors      Abnormal behavior. Body aches    Zetia [ezetimibe]     Iodinated contrast media Rash and Other (See Comments)     Passes out after    Iodine Nausea Only        Current Outpatient Medications "   Medication Sig Dispense Refill    amLODIPine (NORVASC) 5 MG tablet Take 1 tablet (5 mg total) by mouth once daily. 90 tablet 3    clopidogreL (PLAVIX) 75 mg tablet Take 1 tablet (75 mg total) by mouth once daily. 90 tablet 3    losartan-hydrochlorothiazide 100-12.5 mg (HYZAAR) 100-12.5 mg Tab Take 1 tablet by mouth once daily. for HTN 90 tablet 3    metoprolol succinate (TOPROL-XL) 25 MG 24 hr tablet Take 1 tablet (25 mg total) by mouth once daily. 90 tablet 3    nitroGLYCERIN (NITROSTAT) 0.4 MG SL tablet Place 1 tablet (0.4 mg total) under the tongue every 5 (five) minutes as needed for Chest pain. 25 tablet 3    omega-3 fatty acids/fish oil (FISH OIL-OMEGA-3 FATTY ACIDS) 300-1,000 mg capsule Take 2 capsules by mouth 2 (two) times a day.       pantoprazole (PROTONIX) 40 MG tablet Take 1 tablet (40 mg total) by mouth once daily. (Patient taking differently: Take 40 mg by mouth as needed.) 90 tablet 3    tamsulosin (FLOMAX) 0.4 mg Cap Take 1 capsule (0.4 mg total) by mouth once daily. 90 capsule 3     No current facility-administered medications for this visit.       Labs:274}   I have reviewed labs below:    Lab Results   Component Value Date    WBC 7.59 01/09/2024    RBC 4.74 01/09/2024    HGB 14.9 01/09/2024    HCT 43.1 01/09/2024     01/09/2024     01/09/2024    K 4.7 01/09/2024     01/09/2024    CALCIUM 9.3 01/09/2024    GLU 83 01/09/2024    BUN 13 01/09/2024    CREATININE 1.09 01/09/2024    EGFRNONAA 86 04/18/2022    ALT 21 03/28/2023    AST 12 (L) 04/18/2022    CHOL 124 03/28/2023    TRIG 108 03/28/2023    HDL 49 03/28/2023    LDLCALC 138 01/09/2024    TSH 1.430 01/09/2024       Point Of Care Testing (if applicable):  Lab Results   Component Value Date    [id number] Negative 01/15/2025    FLUAMOLEC Negative 01/15/2025    FLUBMOLEC Negative 01/15/2025     Any diagnostic testing results obtained in office or prior to appointment were reviewed were reviewed with patient.    Medical History:  "274}     Past Medical History:   Diagnosis Date    Abnormal echocardiogram 11/10/2022    Anticoagulant long-term use     Benign prostate hyperplasia     Coronary artery disease involving native coronary artery of native heart with angina pectoris     COVID-19 02/10/2022    GERD (gastroesophageal reflux disease)     Hyperlipidemia     Hypertension     Osteoarthritis     Stroke       Social History     Tobacco Use   Smoking Status Never    Passive exposure: Never   Smokeless Tobacco Never      Past Surgical History:   Procedure Laterality Date    ARTHROSCOPY OF KNEE Left 2/21/2023    Procedure: ARTHROSCOPY, KNEE;  Surgeon: Garrett Shipley MD;  Location: [facility] OR;  Service: Orthopedics;  Laterality: Left;    CARTILAGE-SHAVING Left 2/21/2023    Procedure: CARTILAGE-SHAVING;  Surgeon: Garrett Shipley MD;  Location: [facility] OR;  Service: Orthopedics;  Laterality: Left;  shaving of medial femoral condyle    KNEE ARTHROSCOPY W/ MENISCECTOMY Left 2/21/2023    Procedure: ARTHROSCOPY, KNEE, WITH MENISCECTOMY;  Surgeon: Garrett Shipley MD;  Location: [facility] OR;  Service: Orthopedics;  Laterality: Left;  partial medial and lateral menisectomy      LEFT HEART CATHETERIZATION Right 11/15/2022    Procedure: Left heart cath;  Surgeon: Roby Zapata MD;  Location: [facility] CATH LAB;  Service: Cardiology;  Laterality: Right;  right wrist    TONSILLECTOMY      TOTAL KNEE ARTHROPLASTY Right         Health Maintenance:      Health Maintenance Topics with due status: Not Due       Topic Last Completion Date    TETANUS VACCINE 09/07/2017    Lipid Panel 01/09/2024    Aspirin/Antiplatelet Therapy 01/15/2025       Objective:  274}   Vital Signs  Temp: 97.6 °F (36.4 °C)  Temp Source: Oral  Pulse: 68  Resp: 18  SpO2: 98 %  BP: 130/68  BP Location: Left arm  Patient Position: Sitting  Pain Score: 0-No pain  Height and Weight  Height: 5' 9" (175.3 cm)  Weight: 95.7 kg (211 lb)  BSA (Calculated - sq m): " 2.16 sq meters  BMI (Calculated): 31.1  Weight in (lb) to have BMI = 25: 168.9    Over the last two weeks how often have you been bothered by little interest or pleasure in doing things: 0  Over the last two weeks how often have you been bothered by feeling down, depressed or hopeless: 0  PHQ-2 Total Score: 0  PHQ-9 Score: 0  PHQ-9 Interpretation: Minimal or None    Wt Readings from Last 3 Encounters:   01/15/25 95.7 kg (211 lb)   09/23/24 99.5 kg (219 lb 6.4 oz)   08/12/24 99.2 kg (218 lb 12.8 oz)     Physical Exam  Vitals and nursing note reviewed.   Constitutional:       General: He is not in acute distress.     Appearance: Normal appearance. He is not ill-appearing.   HENT:      Head: Normocephalic.      Right Ear: Hearing, tympanic membrane, ear canal and external ear normal.      Left Ear: Hearing, tympanic membrane, ear canal and external ear normal.      Nose: No congestion or rhinorrhea.      Right Sinus: No maxillary sinus tenderness or frontal sinus tenderness.      Left Sinus: No maxillary sinus tenderness or frontal sinus tenderness.      Mouth/Throat:      Lips: Pink.      Mouth: Mucous membranes are moist.      Tongue: No lesions.      Pharynx: Uvula midline. No pharyngeal swelling, oropharyngeal exudate, posterior oropharyngeal erythema or uvula swelling.   Eyes:      General:         Right eye: No discharge.         Left eye: No discharge.      Conjunctiva/sclera: Conjunctivae normal.      Pupils: Pupils are equal, round, and reactive to light.   Cardiovascular:      Rate and Rhythm: Normal rate and regular rhythm.      Pulses: Normal pulses.      Heart sounds: Normal heart sounds.   Pulmonary:      Effort: Pulmonary effort is normal. No respiratory distress.      Breath sounds: Normal breath sounds. No wheezing, rhonchi or rales.   Abdominal:      Palpations: Abdomen is soft.      Tenderness: There is no abdominal tenderness.   Musculoskeletal:      Cervical back: No rigidity.   Lymphadenopathy:       Cervical: No cervical adenopathy.   Skin:     General: Skin is warm and dry.      Coloration: Skin is not jaundiced or pale.      Findings: No rash.   Neurological:      General: No focal deficit present.      Mental Status: He is alert and oriented to person, place, and time.      Gait: Gait normal.   Psychiatric:         Mood and Affect: Mood normal.         Behavior: Behavior normal.         Thought Content: Thought content normal.         Judgment: Judgment normal.          Assessment and Plan: 274}       1. Viral infection  -     POCT COVID-19 Rapid Screening  -     POCT Influenza A/B Molecular    2. Essential hypertension  Assessment & Plan:  Controlled  Continue amlodipine and losartan hydrochlorothiazide.          Assessment & Plan    IMPRESSION:  - Suspected viral infection based on symptom onset, progression, and negative flu/COVID tests  - No signs of bacterial infection or need for antibiotics  - Patient likely past worst of symptoms; continued rest and hydration recommended    VIRAL INFECTION:  - Assessed the patient's condition and determined it to be a viral infection based on symptoms and negative flu and COVID tests.  - Explained the viral nature of the illness and its typical course to the patient.  - Discussed the difference between viral and bacterial infections, emphasizing that antibiotics are ineffective for viruses.  - Informed the patient about the current prevalence of various viral illnesses in the community.  - Noted that the patient started feeling sick on yesterday morning with vomiting, diarrhea, and cough.  - Confirmed good oxygen levels.  - Recommend rest and adequate hydration.    VOMITING:  - Confirmed that the patient has not had any vomiting since yesterday.  - Advised the patient to maintain proper hydration.    DIARRHEA:  - Noted that the patient has had diarrhea since yesterday, with some episodes today, but less frequent.  - Recommend staying hydrated to prevent  dehydration.    COUGH:  - Observed that the patient is not coughing much, but when coughing occurs, it's non-productive.  - Noted that coughing still causes some discomfort in the rib area.  - Advised the patient to monitor cough frequency and intensity.    COMMON COLD:  - Noted that the patient has rhinorrhea.  - Examined the patient's throat and found no signs of sinus infection.  - Advised the patient on symptomatic management of the common cold.    FOLLOW UP:  - Patient to continue to rest.  - Patient to maintain good hydration.  - Contact the office if symptoms worsen or new concerns arise.  - Follow up as scheduled for next regularly scheduled appointment.         Signature:  AR Friedman-BC    Future Appointments   Date Time Provider Department [place]   2/13/2025 11:00 AM AWV NURSE [facility]   2/17/2025 10:20 AM Samantha Araujo FNP [facility] [place]     This note was generated with the assistance of ambient listening technology. Verbal consent was obtained by the patient and accompanying visitor(s) for the recording of patient appointment to facilitate this note. I attest to having reviewed and edited the generated note for accuracy, though some syntax or spelling errors may persist. Please contact the author of this note for any clarification.

## 2025-01-22 ENCOUNTER — TELEPHONE (OUTPATIENT)
Dept: FAMILY MEDICINE | Facility: CLINIC | Age: 85
End: 2025-01-22
Payer: MEDICARE

## 2025-01-22 NOTE — TELEPHONE ENCOUNTER
Call and see what is going on because when he was here a week ago his symptoms were very much consistent with a viral infection.  He was not even having any nasal congestion, sinus pressure, or nasal discharge.  He had very little cough.  He had 24 hours of n/v/d with some body aches.    MURRAY his wife Rosanne called to ask if I could send some antibiotics for both of them.  I haven't seen her since Dec 2022 so it wasn't even a valid question for her, but see what has changed with him so I can decide.

## 2025-01-22 NOTE — TELEPHONE ENCOUNTER
----- Message from Gracy sent at 1/22/2025 12:20 PM CST -----   Pt want to know if you can send in some Antibiotics for  him if so send to [facility]

## 2025-02-17 ENCOUNTER — OFFICE VISIT (OUTPATIENT)
Dept: FAMILY MEDICINE | Facility: CLINIC | Age: 85
End: 2025-02-17
Payer: MEDICARE

## 2025-02-17 VITALS
BODY MASS INDEX: 31.4 KG/M2 | HEIGHT: 69 IN | RESPIRATION RATE: 20 BRPM | HEART RATE: 54 BPM | WEIGHT: 212 LBS | TEMPERATURE: 98 F | DIASTOLIC BLOOD PRESSURE: 77 MMHG | SYSTOLIC BLOOD PRESSURE: 172 MMHG | OXYGEN SATURATION: 97 %

## 2025-02-17 DIAGNOSIS — E78.2 MIXED HYPERLIPIDEMIA: ICD-10-CM

## 2025-02-17 DIAGNOSIS — I25.83 CORONARY ARTERY DISEASE DUE TO LIPID RICH PLAQUE: Chronic | ICD-10-CM

## 2025-02-17 DIAGNOSIS — Z79.899 OTHER LONG TERM (CURRENT) DRUG THERAPY: ICD-10-CM

## 2025-02-17 DIAGNOSIS — I25.10 CORONARY ARTERY DISEASE DUE TO LIPID RICH PLAQUE: Chronic | ICD-10-CM

## 2025-02-17 DIAGNOSIS — I10 ESSENTIAL HYPERTENSION: Primary | ICD-10-CM

## 2025-02-17 LAB
ALBUMIN SERPL BCP-MCNC: 3.7 G/DL (ref 3.4–4.8)
ALBUMIN/GLOB SERPL: 1.1 {RATIO}
ALP SERPL-CCNC: 77 U/L (ref 40–150)
ALT SERPL W P-5'-P-CCNC: 12 U/L
ANION GAP SERPL CALCULATED.3IONS-SCNC: 14 MMOL/L (ref 7–16)
AST SERPL W P-5'-P-CCNC: 17 U/L (ref 5–34)
BASOPHILS # BLD AUTO: 0.06 K/UL (ref 0–0.2)
BASOPHILS NFR BLD AUTO: 0.9 % (ref 0–1)
BILIRUB SERPL-MCNC: 0.9 MG/DL
BUN SERPL-MCNC: 15 MG/DL (ref 8–26)
BUN/CREAT SERPL: 17 (ref 6–20)
CALCIUM SERPL-MCNC: 9.2 MG/DL (ref 8.8–10)
CHLORIDE SERPL-SCNC: 101 MMOL/L (ref 98–107)
CHOLEST SERPL-MCNC: 235 MG/DL
CHOLEST/HDLC SERPL: 6 {RATIO}
CO2 SERPL-SCNC: 26 MMOL/L (ref 23–31)
CREAT SERPL-MCNC: 0.87 MG/DL (ref 0.72–1.25)
DIFFERENTIAL METHOD BLD: ABNORMAL
EGFR (NO RACE VARIABLE) (RUSH/TITUS): 86 ML/MIN/1.73M2
EOSINOPHIL # BLD AUTO: 0.4 K/UL (ref 0–0.5)
EOSINOPHIL NFR BLD AUTO: 5.7 % (ref 1–4)
ERYTHROCYTE [DISTWIDTH] IN BLOOD BY AUTOMATED COUNT: 12.3 % (ref 11.5–14.5)
EST. AVERAGE GLUCOSE BLD GHB EST-MCNC: 108 MG/DL
GLOBULIN SER-MCNC: 3.4 G/DL (ref 2–4)
GLUCOSE SERPL-MCNC: 84 MG/DL (ref 82–115)
HBA1C MFR BLD HPLC: 5.4 %
HCT VFR BLD AUTO: 47.6 % (ref 40–54)
HDLC SERPL-MCNC: 39 MG/DL (ref 35–60)
HGB BLD-MCNC: 15.7 G/DL (ref 13.5–18)
IMM GRANULOCYTES # BLD AUTO: 0.02 K/UL (ref 0–0.04)
IMM GRANULOCYTES NFR BLD: 0.3 % (ref 0–0.4)
LDLC SERPL CALC-MCNC: 158 MG/DL
LDLC/HDLC SERPL: 4.1 {RATIO}
LYMPHOCYTES # BLD AUTO: 1.26 K/UL (ref 1–4.8)
LYMPHOCYTES NFR BLD AUTO: 18 % (ref 27–41)
MCH RBC QN AUTO: 31 PG (ref 27–31)
MCHC RBC AUTO-ENTMCNC: 33 G/DL (ref 32–36)
MCV RBC AUTO: 94.1 FL (ref 80–96)
MONOCYTES # BLD AUTO: 0.7 K/UL (ref 0–0.8)
MONOCYTES NFR BLD AUTO: 10 % (ref 2–6)
MPC BLD CALC-MCNC: 10.3 FL (ref 9.4–12.4)
NEUTROPHILS # BLD AUTO: 4.55 K/UL (ref 1.8–7.7)
NEUTROPHILS NFR BLD AUTO: 65.1 % (ref 53–65)
NONHDLC SERPL-MCNC: 196 MG/DL
NRBC # BLD AUTO: 0 X10E3/UL
NRBC, AUTO (.00): 0 %
PLATELET # BLD AUTO: 325 K/UL (ref 150–400)
POTASSIUM SERPL-SCNC: 3.8 MMOL/L (ref 3.5–5.1)
PROT SERPL-MCNC: 7.1 G/DL (ref 5.8–7.6)
RBC # BLD AUTO: 5.06 M/UL (ref 4.6–6.2)
SODIUM SERPL-SCNC: 137 MMOL/L (ref 136–145)
TRIGL SERPL-MCNC: 192 MG/DL (ref 34–140)
TSH SERPL DL<=0.005 MIU/L-ACNC: 1.37 UIU/ML (ref 0.35–4.94)
VLDLC SERPL-MCNC: 38 MG/DL
WBC # BLD AUTO: 6.99 K/UL (ref 4.5–11)

## 2025-02-17 PROCEDURE — 83036 HEMOGLOBIN GLYCOSYLATED A1C: CPT | Mod: ,,, | Performed by: CLINICAL MEDICAL LABORATORY

## 2025-02-17 PROCEDURE — 99214 OFFICE O/P EST MOD 30 MIN: CPT | Mod: ,,, | Performed by: NURSE PRACTITIONER

## 2025-02-17 PROCEDURE — 84443 ASSAY THYROID STIM HORMONE: CPT | Mod: ,,, | Performed by: CLINICAL MEDICAL LABORATORY

## 2025-02-17 RX ORDER — METOPROLOL SUCCINATE 25 MG/1
25 TABLET, EXTENDED RELEASE ORAL DAILY
Qty: 90 TABLET | Refills: 3 | Status: SHIPPED | OUTPATIENT
Start: 2025-02-17 | End: 2026-02-17

## 2025-02-17 NOTE — ASSESSMENT & PLAN NOTE
Not controlled, has not taken meds today  Continue amlodipine 5 mg daily, losartan hydrochlorothiazide 100/12.5 mg daily, and metoprolol XL 25 mg daily.

## 2025-02-17 NOTE — PROGRESS NOTES
" [facility]  [address] [place] [address]  Phone: [phone]  Fax: [phone]       PATIENT NAME: Cj Thomas   : 1940    AGE: 84 y.o. DATE OF ENCOUNTER: 25    MRN: [id number]      PCP: Samantha Araujo FNP    Subjective:   CHANGE CHIEF COMPLAINT      :[id number]}274}  Chief Complaint   Patient presents with    Follow-up     Patient presents to clinic for 6 month follow-up HTN.     Hypertension     HPI:  Patient reports significant ear itching recently. He cleans his ear with a Q-tip 2-3 times weekly. He recently had a severe cough and cold, which he describes as a viral infection lasting 2-3 weeks, with clear drainage and a persistent cough. He was seen in office during this illness, and called after the visit but an antibiotic was not determined to be necessary. His blood pressure was noted to be elevated during the visit. He did not take his blood pressure medication this morning, as he had not eaten breakfast due to fasting for labs. His wife might have a blood pressure cuff at home, suggesting he does not regularly monitor his blood pressure himself.    He denies any problems with his belly or reflux symptoms. He denies shortness of breath, headache, dizziness, visual disturbances, or weakness.      ROS:  Constitutional: -dizziness  Head: -headaches  Eyes: -vision changes  ENT: +nasal congestion  Respiratory: -shortness of breath, +cough  Gastrointestinal: -heartburn  Integumentary: +itching, +itching  Neurological: -weakness         Allergies and Meds: 274}     Review of patient's allergies indicates:   Allergen Reactions    Niacin Swelling     "Not sure"    Demerol [meperidine] Other (See Comments)     "Forgets everything"    Iodine and iodide containing products     Statins-hmg-coa reductase inhibitors      Abnormal behavior. Body aches    Zetia [ezetimibe]     Iodinated contrast media Rash and Other (See Comments)     Passes out after    Iodine " Nausea Only        Current Medications[1]    Labs:274}   I have reviewed labs below:    Lab Results   Component Value Date    WBC 7.59 01/09/2024    RBC 4.74 01/09/2024    HGB 14.9 01/09/2024    HCT 43.1 01/09/2024     01/09/2024     01/09/2024    K 4.7 01/09/2024     01/09/2024    CALCIUM 9.3 01/09/2024    GLU 83 01/09/2024    BUN 13 01/09/2024    CREATININE 1.09 01/09/2024    EGFRNONAA 86 04/18/2022    ALT 21 03/28/2023    AST 12 (L) 04/18/2022    CHOL 124 03/28/2023    TRIG 108 03/28/2023    HDL 49 03/28/2023    LDLCALC 138 01/09/2024    TSH 1.430 01/09/2024       Point Of Care Testing (if applicable):  Lab Results   Component Value Date    [id number] Negative 01/15/2025    FLUAMOLEC Negative 01/15/2025    FLUBMOLEC Negative 01/15/2025     Any diagnostic testing results obtained in office or prior to appointment were reviewed were reviewed with patient.    Medical History: 274}     Past Medical History:   Diagnosis Date    Abnormal echocardiogram 11/10/2022    Anticoagulant long-term use     Benign prostate hyperplasia     Coronary artery disease involving native coronary artery of native heart with angina pectoris     COVID-19 02/10/2022    GERD (gastroesophageal reflux disease)     Hyperlipidemia     Hypertension     Osteoarthritis     Stroke       Tobacco Use History[2]   Past Surgical History:   Procedure Laterality Date    ARTHROSCOPY OF KNEE Left 2/21/2023    Procedure: ARTHROSCOPY, KNEE;  Surgeon: Garrett Shipley MD;  Location: [facility];  Service: Orthopedics;  Laterality: Left;    CARTILAGE-SHAVING Left 2/21/2023    Procedure: CARTILAGE-SHAVING;  Surgeon: Garrett Shipley MD;  Location: [facility];  Service: Orthopedics;  Laterality: Left;  shaving of medial femoral condyle    KNEE ARTHROSCOPY W/ MENISCECTOMY Left 2/21/2023    Procedure: ARTHROSCOPY, KNEE, WITH MENISCECTOMY;  Surgeon: Garrett Shipley MD;  Location: [facility];  Service:  "Orthopedics;  Laterality: Left;  partial medial and lateral menisectomy      LEFT HEART CATHETERIZATION Right 11/15/2022    Procedure: Left heart cath;  Surgeon: Roby Zapata MD;  Location: [facility] CATH LAB;  Service: Cardiology;  Laterality: Right;  right wrist    TONSILLECTOMY      TOTAL KNEE ARTHROPLASTY Right         Health Maintenance:      Health Maintenance Topics with due status: Not Due       Topic Last Completion Date    TETANUS VACCINE 09/07/2017    Lipid Panel 01/09/2024    Aspirin/Antiplatelet Therapy 02/17/2025       Objective:  274}   Vital Signs  Temp: 97.5 °F (36.4 °C)  Temp Source: Oral  Pulse: (!) 54  Resp: 20  SpO2: 97 %  BP: (!) 172/77  BP Location: Left arm  Patient Position: Sitting  Pain Score: 0-No pain  Height and Weight  Height: 5' 9" (175.3 cm)  Weight: 96.2 kg (212 lb)  BSA (Calculated - sq m): 2.16 sq meters  BMI (Calculated): 31.3  Weight in (lb) to have BMI = 25: 168.9    Over the last two weeks how often have you been bothered by little interest or pleasure in doing things: 0  Over the last two weeks how often have you been bothered by feeling down, depressed or hopeless: 0  PHQ-2 Total Score: 0    Wt Readings from Last 3 Encounters:   02/17/25 96.2 kg (212 lb)   01/15/25 95.7 kg (211 lb)   09/23/24 99.5 kg (219 lb 6.4 oz)     Physical Exam  Vitals and nursing note reviewed.   Constitutional:       General: He is not in acute distress.     Appearance: Normal appearance. He is not ill-appearing.   HENT:      Head: Normocephalic.      Right Ear: Tympanic membrane, ear canal and external ear normal.      Left Ear: Tympanic membrane, ear canal and external ear normal.      Nose: Nose normal.      Mouth/Throat:      Mouth: Mucous membranes are moist.      Pharynx: Oropharynx is clear. Uvula midline. No posterior oropharyngeal erythema or uvula swelling.   Eyes:      Conjunctiva/sclera: Conjunctivae normal.      Pupils: Pupils are equal, round, and reactive to light.   Neck:      " Thyroid: No thyromegaly.      Vascular: Normal carotid pulses. No carotid bruit.   Cardiovascular:      Rate and Rhythm: Normal rate and regular rhythm.      Pulses: Normal pulses.      Heart sounds: Normal heart sounds.   Pulmonary:      Effort: Pulmonary effort is normal. No respiratory distress.      Breath sounds: Normal breath sounds. No wheezing, rhonchi or rales.   Abdominal:      Palpations: Abdomen is soft.      Tenderness: There is no abdominal tenderness.   Musculoskeletal:      Cervical back: Neck supple.      Right lower leg: No edema.      Left lower leg: No edema.   Lymphadenopathy:      Cervical: No cervical adenopathy.   Skin:     General: Skin is warm and dry.      Coloration: Skin is not jaundiced or pale.   Neurological:      General: No focal deficit present.      Mental Status: He is alert and oriented to person, place, and time.      Gait: Gait normal.   Psychiatric:         Mood and Affect: Mood normal.         Behavior: Behavior normal.          Assessment and Plan: 274}       1. Essential hypertension  Assessment & Plan:  Not controlled, has not taken meds today  Continue amlodipine 5 mg daily, losartan hydrochlorothiazide 100/12.5 mg daily, and metoprolol XL 25 mg daily.    Orders:  -     CBC Auto Differential; Future; Expected date: 02/17/2025  -     Comprehensive Metabolic Panel; Future; Expected date: 02/17/2025  -     TSH; Future; Expected date: 02/17/2025  -     metoprolol succinate (TOPROL-XL) 25 MG 24 hr tablet; Take 1 tablet (25 mg total) by mouth once daily.  Dispense: 90 tablet; Refill: 3    2. Mixed hyperlipidemia  -     CBC Auto Differential; Future; Expected date: 02/17/2025  -     Lipid Panel; Future; Expected date: 02/17/2025  -     TSH; Future; Expected date: 02/17/2025    3. Other long term (current) drug therapy  -     Comprehensive Metabolic Panel; Future; Expected date: 02/17/2025  -     TSH; Future; Expected date: 02/17/2025  -     Hemoglobin A1C; Future; Expected  date: 02/17/2025    4. Coronary artery disease due to lipid rich plaque  -     metoprolol succinate (TOPROL-XL) 25 MG 24 hr tablet; Take 1 tablet (25 mg total) by mouth once daily.  Dispense: 90 tablet; Refill: 3        Assessment & Plan    IMPRESSION:  - Elevated blood pressure likely due to patient not taking medication this morning  - Ear itching potentially caused by hair growth in ear canal; no significant wax buildup observed  - Recent viral infection resolved without antibiotic intervention    UNCONTROLLED HYPERTENSION:  - Educated the patient on the importance of taking antihypertensive medication as prescribed, even when fasting for labs.  - Instructed the patient to use home blood pressure cuff to monitor blood pressure regularly.  - Continued antihypertensive medication; to be taken as prescribed, even on days of fasting for labs.  - Advised the patient to take blood pressure medication before next appointment.  - Scheduled follow up in 1 month for reassessment of uncontrolled hypertension.  - Instructed the patient to contact the office if experiencing shortness of breath, headache, dizziness, visual disturbances, weakness, or any other complications.  - Noted that the patient's blood pressure was elevated on recheck, likely due to not taking medication this morning.  - Assessed the situation as uncontrolled hypertension.    EAR ITCHING:  - Explained to the patient that ear itching could be related to hair growth in the ear canal, a common occurrence in older men.  - Examined the ear and noted a significant amount of hair, but no significant cerumen.  - Recommend applying OTC cortisone to ear canal to alleviate itching.    GASTROESOPHAGEAL REFLUX DISEASE (GERD):  - Decreased Protonix dosage to be taken as needed for reflux symptoms.  - Noted that the patient has stopped taking reflux medication (Protonix) regularly.  - Acknowledged that the reflux medication is effective when taken.    LABS:  - Blood  work ordered.       Follow up in about 1 month (around 3/17/2025) for uncontrolled HTN.    Signature:  AR Friedman-BC    Future Appointments   Date Time Provider Department Center   3/17/2025 11:40 AM Samantha Araujo FNP [facility] [place]   3/18/2025  9:00 AM AWV NURSE, [facility] [place]     This note was generated with the assistance of ambient listening technology. Verbal consent was obtained by the patient and accompanying visitor(s) for the recording of patient appointment to facilitate this note. I attest to having reviewed and edited the generated note for accuracy, though some syntax or spelling errors may persist. Please contact the author of this note for any clarification.           [1]   Current Outpatient Medications   Medication Sig Dispense Refill    amLODIPine (NORVASC) 5 MG tablet Take 1 tablet (5 mg total) by mouth once daily. 90 tablet 3    clopidogreL (PLAVIX) 75 mg tablet Take 1 tablet (75 mg total) by mouth once daily. 90 tablet 3    losartan-hydrochlorothiazide 100-12.5 mg (HYZAAR) 100-12.5 mg Tab Take 1 tablet by mouth once daily. for HTN 90 tablet 3    nitroGLYCERIN (NITROSTAT) 0.4 MG SL tablet Place 1 tablet (0.4 mg total) under the tongue every 5 (five) minutes as needed for Chest pain. 25 tablet 3    omega-3 fatty acids/fish oil (FISH OIL-OMEGA-3 FATTY ACIDS) 300-1,000 mg capsule Take 2 capsules by mouth 2 (two) times a day.       tamsulosin (FLOMAX) 0.4 mg Cap Take 1 capsule (0.4 mg total) by mouth once daily. 90 capsule 3    metoprolol succinate (TOPROL-XL) 25 MG 24 hr tablet Take 1 tablet (25 mg total) by mouth once daily. 90 tablet 3     No current facility-administered medications for this visit.   [2]   Social History  Tobacco Use   Smoking Status Never    Passive exposure: Never   Smokeless Tobacco Never

## 2025-02-23 ENCOUNTER — RESULTS FOLLOW-UP (OUTPATIENT)
Dept: FAMILY MEDICINE | Facility: CLINIC | Age: 85
End: 2025-02-23

## 2025-02-23 NOTE — PROGRESS NOTES
Call patient and review results. Trigs, TC, & LDL are not good, but every cholesterol med he has been prescribed he ends up stopping reporting side effects or just not wanting to take it.  He is at risk for CVA or MI with an LDL this high. Otherwise, labs are good.

## 2025-03-17 ENCOUNTER — OFFICE VISIT (OUTPATIENT)
Dept: FAMILY MEDICINE | Facility: CLINIC | Age: 85
End: 2025-03-17
Payer: MEDICARE

## 2025-03-17 VITALS
SYSTOLIC BLOOD PRESSURE: 122 MMHG | DIASTOLIC BLOOD PRESSURE: 78 MMHG | OXYGEN SATURATION: 99 % | HEART RATE: 56 BPM | BODY MASS INDEX: 31.45 KG/M2 | TEMPERATURE: 98 F | HEIGHT: 69 IN | WEIGHT: 212.38 LBS | RESPIRATION RATE: 18 BRPM

## 2025-03-17 DIAGNOSIS — I25.83 CORONARY ARTERY DISEASE DUE TO LIPID RICH PLAQUE: Chronic | ICD-10-CM

## 2025-03-17 DIAGNOSIS — E78.2 MIXED HYPERLIPIDEMIA: Chronic | ICD-10-CM

## 2025-03-17 DIAGNOSIS — I25.10 CORONARY ARTERY DISEASE DUE TO LIPID RICH PLAQUE: Chronic | ICD-10-CM

## 2025-03-17 DIAGNOSIS — I10 ESSENTIAL HYPERTENSION: Primary | Chronic | ICD-10-CM

## 2025-03-17 DIAGNOSIS — Z86.73 HISTORY OF CVA (CEREBROVASCULAR ACCIDENT): Chronic | ICD-10-CM

## 2025-03-17 DIAGNOSIS — Z88.8 ALLERGY TO STATIN MEDICATION: ICD-10-CM

## 2025-03-17 PROCEDURE — 99214 OFFICE O/P EST MOD 30 MIN: CPT | Mod: ,,, | Performed by: NURSE PRACTITIONER

## 2025-03-17 RX ORDER — FENOFIBRATE 160 MG/1
160 TABLET ORAL DAILY
Qty: 90 TABLET | Refills: 3 | Status: SHIPPED | OUTPATIENT
Start: 2025-03-17 | End: 2026-03-17

## 2025-03-17 NOTE — PROGRESS NOTES
[facility]  [address]   [place] [address]  Phone: [phone]  Fax: [phone]       PATIENT NAME: Cj Thomas   : 1940    AGE: 84 y.o. DATE OF ENCOUNTER: 3/17/25    MRN: [id number]      PCP: Samantha Araujo FNP    Subjective:   CHANGE CHIEF COMPLAINT      :[id number]}274}  Chief Complaint   Patient presents with    Hypertension     Patient reports to the clinic today for 1 month follow up. He c/o itching and stuffiness in his left ear since his last visit.    Health Maintenance     Care gaps addressed, patient would like to discuss RSV vaccine.    Areli Luong CMA       Patient's blood pressure has significantly improved since the last visit, with a current reading of 122/78. This is a substantial improvement from the previous elevated reading.     Patient reports itching and stuffiness in the left ear.    Regarding cholesterol, patient's levels have been consistently high and steadily increasing over time. The most recent cholesterol test, conducted a month ago, showed a total cholesterol of 235 and LDL of 158, which are considered significantly elevated. Patient has a history of coronary artery disease, having previously required a stent placement due to lipid-rich plaque. Patient has a history of stroke, putting him at higher risk for cardiovascular events.    Patient has previously tried statins for cholesterol management but had severe side effects, including body pain, itching, and rash. Zetia was also attempted but caused a rash. These adverse reactions led to the discontinuation of these medications and their addition to patient's allergy list.  He is willing now to try another medication to lower cholesterol.    Patient denies any current symptoms related to coronary artery disease or stroke.      ROS:  ENT: +ear pressure, +ear pruritus         Allergies and Meds: 274}     Review of patient's allergies indicates:   Allergen Reactions    Niacin  "Swelling     "Not sure"    Demerol [meperidine] Other (See Comments)     "Forgets everything"    Iodine and iodide containing products     Statins-hmg-coa reductase inhibitors      Abnormal behavior. Body aches    Zetia [ezetimibe]     Iodinated contrast media Rash and Other (See Comments)     Passes out after    Iodine Nausea Only        Current Outpatient Medications   Medication Sig Dispense Refill    amLODIPine (NORVASC) 5 MG tablet Take 1 tablet (5 mg total) by mouth once daily. 90 tablet 3    clopidogreL (PLAVIX) 75 mg tablet Take 1 tablet (75 mg total) by mouth once daily. 90 tablet 3    losartan-hydrochlorothiazide 100-12.5 mg (HYZAAR) 100-12.5 mg Tab Take 1 tablet by mouth once daily. for HTN 90 tablet 3    metoprolol succinate (TOPROL-XL) 25 MG 24 hr tablet Take 1 tablet (25 mg total) by mouth once daily. 90 tablet 3    nitroGLYCERIN (NITROSTAT) 0.4 MG SL tablet Place 1 tablet (0.4 mg total) under the tongue every 5 (five) minutes as needed for Chest pain. 25 tablet 3    omega-3 fatty acids/fish oil (FISH OIL-OMEGA-3 FATTY ACIDS) 300-1,000 mg capsule Take 2 capsules by mouth 2 (two) times a day.       tamsulosin (FLOMAX) 0.4 mg Cap Take 1 capsule (0.4 mg total) by mouth once daily. 90 capsule 3    fenofibrate 160 MG Tab Take 1 tablet (160 mg total) by mouth once daily. 90 tablet 3     No current facility-administered medications for this visit.       Labs:274}   I have reviewed labs below:    Lab Results   Component Value Date    WBC 6.99 02/17/2025    RBC 5.06 02/17/2025    HGB 15.7 02/17/2025    HCT 47.6 02/17/2025     02/17/2025     02/17/2025    K 3.8 02/17/2025     02/17/2025    CALCIUM 9.2 02/17/2025    GLU 84 02/17/2025    BUN 15 02/17/2025    CREATININE 0.87 02/17/2025    EGFRNONAA 86 04/18/2022    ALT 12 02/17/2025    AST 17 02/17/2025    CHOL 235 (H) 02/17/2025    TRIG 192 (H) 02/17/2025    HDL 39 02/17/2025    LDLCALC 158 02/17/2025    TSH 1.372 02/17/2025    HGBA1C 5.4 " "02/17/2025       Medical History: 274}     Past Medical History:   Diagnosis Date    Abnormal echocardiogram 11/10/2022    Anticoagulant long-term use     Benign prostate hyperplasia     Coronary artery disease involving native coronary artery of native heart with angina pectoris     COVID-19 02/10/2022    GERD (gastroesophageal reflux disease)     Hyperlipidemia     Hypertension     Osteoarthritis     Stroke       Tobacco Use History[1]   Past Surgical History:   Procedure Laterality Date    ARTHROSCOPY OF KNEE Left 2/21/2023    Procedure: ARTHROSCOPY, KNEE;  Surgeon: Garrett Shipley MD;  Location: [facility] OR;  Service: Orthopedics;  Laterality: Left;    CARTILAGE-SHAVING Left 2/21/2023    Procedure: CARTILAGE-SHAVING;  Surgeon: Garrett Shipley MD;  Location: [facility] OR;  Service: Orthopedics;  Laterality: Left;  shaving of medial femoral condyle    KNEE ARTHROSCOPY W/ MENISCECTOMY Left 2/21/2023    Procedure: ARTHROSCOPY, KNEE, WITH MENISCECTOMY;  Surgeon: Garrett Shipley MD;  Location: [facility] OR;  Service: Orthopedics;  Laterality: Left;  partial medial and lateral menisectomy      LEFT HEART CATHETERIZATION Right 11/15/2022    Procedure: Left heart cath;  Surgeon: Roby Zapata MD;  Location: [facility] CATH LAB;  Service: Cardiology;  Laterality: Right;  right wrist    TONSILLECTOMY      TOTAL KNEE ARTHROPLASTY Right         Health Maintenance:      Health Maintenance Topics with due status: Not Due       Topic Last Completion Date    TETANUS VACCINE 09/07/2017    Aspirin/Antiplatelet Therapy 02/17/2025    Lipid Panel 02/17/2025       Objective:  274}   Vital Signs  Temp: 97.7 °F (36.5 °C)  Temp Source: Oral  Pulse: (!) 56  Resp: 18  SpO2: 99 %  BP: 122/78  BP Location: Left arm  Patient Position: Sitting  Pain Score: 0-No pain  Height and Weight  Height: 5' 9" (175.3 cm)  Weight: 96.3 kg (212 lb 6.4 oz)  BSA (Calculated - sq m): 2.17 sq meters  BMI (Calculated): " 31.4  Weight in (lb) to have BMI = 25: 168.9    Over the last two weeks how often have you been bothered by little interest or pleasure in doing things: 0  Over the last two weeks how often have you been bothered by feeling down, depressed or hopeless: 0  PHQ-2 Total Score: 0    Wt Readings from Last 3 Encounters:   03/17/25 96.3 kg (212 lb 6.4 oz)   02/17/25 96.2 kg (212 lb)   01/15/25 95.7 kg (211 lb)     Physical Exam  Vitals and nursing note reviewed.   Constitutional:       General: He is not in acute distress.     Appearance: Normal appearance. He is obese. He is not ill-appearing.   HENT:      Head: Normocephalic.      Right Ear: Tympanic membrane, ear canal and external ear normal.      Left Ear: Tympanic membrane, ear canal and external ear normal.      Nose: Nose normal.      Mouth/Throat:      Mouth: Mucous membranes are moist.      Pharynx: Oropharynx is clear.   Eyes:      Conjunctiva/sclera: Conjunctivae normal.   Neck:      Trachea: Trachea normal.   Cardiovascular:      Rate and Rhythm: Normal rate and regular rhythm.      Pulses: Normal pulses.      Heart sounds: Normal heart sounds.   Pulmonary:      Effort: Pulmonary effort is normal. No respiratory distress.      Breath sounds: Normal breath sounds. No wheezing, rhonchi or rales.   Musculoskeletal:      Cervical back: Neck supple.      Right lower leg: No edema.      Left lower leg: No edema.   Lymphadenopathy:      Cervical: No cervical adenopathy.   Skin:     General: Skin is warm and dry.      Coloration: Skin is not jaundiced or pale.   Neurological:      Mental Status: He is alert and oriented to person, place, and time.      Gait: Gait normal.   Psychiatric:         Mood and Affect: Mood normal.         Behavior: Behavior normal.          Assessment and Plan: 274}       1. Essential hypertension    2. Mixed hyperlipidemia  -     fenofibrate 160 MG Tab; Take 1 tablet (160 mg total) by mouth once daily.  Dispense: 90 tablet; Refill: 3    3.  Allergy to statin medication  Assessment & Plan:  Severe myalgias with more than 1 statin and rosuvastatin also caused a rash.  Had rash with zetia.  Willing to try fenofibrate.       4. Coronary artery disease due to lipid rich plaque  -     fenofibrate 160 MG Tab; Take 1 tablet (160 mg total) by mouth once daily.  Dispense: 90 tablet; Refill: 3    5. History of CVA (cerebrovascular accident)  -     fenofibrate 160 MG Tab; Take 1 tablet (160 mg total) by mouth once daily.  Dispense: 90 tablet; Refill: 3        Assessment & Plan    IMPRESSION:  - Blood pressure significantly improved (122/78), a notable decrease from previous elevated readings.  - Reviewed cholesterol levels: total cholesterol 235, , indicating high risk for cardiovascular events.  - Initiated fenofibrate, a non-statin option, to address elevated cholesterol levels due to history of intolerance to statins and Zetia.  - Evaluated left ear for reported itching and stuffiness; no signs of infection or fluid accumulation observed.  - Educated on the potential for dry ear canals causing itching as a common age-related issue.    HYPERTENSION:  - Measured blood pressure at 122/78, which is significantly improved from previous visit.  - Evaluated blood pressure as good and much improved.  - Acknowledged great improvement in blood pressure.  - Continued losartan hydrochlorothiazide, amlodipine, and metoprolol for blood pressure management.    HYPERLIPIDEMIA:  - Measured total cholesterol level at 235 with LDL at 158, which is significantly high and has been increasing over time.  - Evaluated cholesterol levels as too high, increasing risk for strokes and heart attacks.  - Discussed the need for cholesterol management, especially given the patient's history of heart stent and stroke.  - Explained the role of fiber in lowering cholesterol by binding to bad cholesterol and facilitating excretion.  - Discussed natural ways to lower cholesterol, including  increasing fiber intake, fish oil (omega-3), and CoQ10 supplementation.  - Clarified that OTC supplements like CoQ10 do not directly lower cholesterol but may have other cardiovascular benefits.  - Initiated fenofibrate, a non-statin option, to address elevated cholesterol levels due to history of intolerance to statins and Zetia.  - Patient to increase fiber intake, especially with heavy meals, to help lower cholesterol naturally.  - Recommend incorporating fish oil (omega-3) supplements into diet for potential cholesterol benefits.    CORONARY ARTERY DISEASE:  - Acknowledged the patient's history of coronary artery disease with stent placement due to lipid-rich plaque.    HISTORY OF STROKE:  - Acknowledged the patient's history of stroke.    LEFT EAR DISORDER:  - Noted the patient's report of itching and stuffiness in left ear.  - Examined ear and found no fluid or infection, attributed symptoms to possible dryness.  - Recommend using OTC hydrocortisone on a cotton-tipped applicator applied inside the ear canal for itching relief.    DRUG ALLERGIES:  - Acknowledged the patient's history of allergic reactions to statins (severe body pain and itching) and Zetia (rash).  - Recommend fenofibrate as an alternative to statins due to the patient's history of allergic reactions.    FOLLOW-UP:  - Scheduled follow up in 3 months to assess response to fenofibrate and evaluate cholesterol levels.  - Cancelled Medicare visit scheduled for the following day as per patient's request.       Follow up in about 3 months (around 6/17/2025) for hypertension, hyperlipidemia, with fasting labs.    Signature:  AR Friedman-BC    Future Appointments   Date Time Provider Department Center   6/18/2025  8:00 AM Samantha Araujo FNP [facility]     This note was generated with the assistance of ambient listening technology. Verbal consent was obtained by the patient and accompanying visitor(s) for the recording  of patient appointment to facilitate this note. I attest to having reviewed and edited the generated note for accuracy, though some syntax or spelling errors may persist. Please contact the author of this note for any clarification.           [1]   Social History  Tobacco Use   Smoking Status Never    Passive exposure: Never   Smokeless Tobacco Never

## 2025-03-17 NOTE — ASSESSMENT & PLAN NOTE
Severe myalgias with more than 1 statin and rosuvastatin also caused a rash.  Had rash with zetia.  Willing to try fenofibrate.

## 2025-04-04 DIAGNOSIS — I25.119 CORONARY ARTERY DISEASE INVOLVING NATIVE CORONARY ARTERY OF NATIVE HEART WITH ANGINA PECTORIS: Chronic | ICD-10-CM

## 2025-04-04 DIAGNOSIS — Z86.73 HISTORY OF CVA (CEREBROVASCULAR ACCIDENT): ICD-10-CM

## 2025-04-06 RX ORDER — CLOPIDOGREL BISULFATE 75 MG/1
75 TABLET ORAL DAILY
Qty: 90 TABLET | Refills: 3 | Status: SHIPPED | OUTPATIENT
Start: 2025-04-06

## 2025-04-29 DIAGNOSIS — N40.0 BENIGN PROSTATIC HYPERPLASIA WITHOUT URINARY OBSTRUCTION: Chronic | ICD-10-CM

## 2025-04-29 RX ORDER — TAMSULOSIN HYDROCHLORIDE 0.4 MG/1
1 CAPSULE ORAL DAILY
Qty: 90 CAPSULE | Refills: 3 | Status: SHIPPED | OUTPATIENT
Start: 2025-04-29

## 2025-05-05 ENCOUNTER — TELEPHONE (OUTPATIENT)
Dept: FAMILY MEDICINE | Facility: CLINIC | Age: 85
End: 2025-05-05
Payer: MEDICARE

## 2025-05-05 NOTE — TELEPHONE ENCOUNTER
----- Message from Gracy sent at 5/5/2025  2:47 PM CDT -----   Mobic sent to [facility]

## 2025-05-05 NOTE — TELEPHONE ENCOUNTER
He does not need to take Mobic, he is on long-term Plavix.  He was on Mobic several years ago before his heart issue that requires him to be on Plavix.  Long-term

## 2025-06-19 ENCOUNTER — OFFICE VISIT (OUTPATIENT)
Dept: FAMILY MEDICINE | Facility: CLINIC | Age: 85
End: 2025-06-19
Payer: MEDICARE

## 2025-06-19 VITALS
SYSTOLIC BLOOD PRESSURE: 138 MMHG | HEIGHT: 69 IN | DIASTOLIC BLOOD PRESSURE: 68 MMHG | OXYGEN SATURATION: 96 % | TEMPERATURE: 98 F | HEART RATE: 56 BPM | WEIGHT: 211.81 LBS | BODY MASS INDEX: 31.37 KG/M2 | RESPIRATION RATE: 20 BRPM

## 2025-06-19 DIAGNOSIS — I25.83 CORONARY ARTERY DISEASE DUE TO LIPID RICH PLAQUE: Chronic | ICD-10-CM

## 2025-06-19 DIAGNOSIS — I25.10 CORONARY ARTERY DISEASE DUE TO LIPID RICH PLAQUE: Chronic | ICD-10-CM

## 2025-06-19 DIAGNOSIS — R07.89 OTHER CHEST PAIN: ICD-10-CM

## 2025-06-19 DIAGNOSIS — I10 ESSENTIAL HYPERTENSION: Primary | Chronic | ICD-10-CM

## 2025-06-19 DIAGNOSIS — Z79.899 OTHER LONG TERM (CURRENT) DRUG THERAPY: ICD-10-CM

## 2025-06-19 DIAGNOSIS — Z12.5 PROSTATE CANCER SCREENING: ICD-10-CM

## 2025-06-19 DIAGNOSIS — E78.2 MIXED HYPERLIPIDEMIA: Chronic | ICD-10-CM

## 2025-06-19 DIAGNOSIS — K21.9 GASTROESOPHAGEAL REFLUX DISEASE WITHOUT ESOPHAGITIS: ICD-10-CM

## 2025-06-19 DIAGNOSIS — Z88.8 ALLERGY TO STATIN MEDICATION: ICD-10-CM

## 2025-06-19 LAB
ALBUMIN SERPL BCP-MCNC: 3.7 G/DL (ref 3.4–4.8)
ALBUMIN/GLOB SERPL: 1 {RATIO}
ALP SERPL-CCNC: 39 U/L (ref 40–150)
ALT SERPL W P-5'-P-CCNC: 23 U/L
ANION GAP SERPL CALCULATED.3IONS-SCNC: 12 MMOL/L (ref 7–16)
AST SERPL W P-5'-P-CCNC: 27 U/L (ref 11–45)
BILIRUB SERPL-MCNC: 0.6 MG/DL
BUN SERPL-MCNC: 18 MG/DL (ref 8–26)
BUN/CREAT SERPL: 15 (ref 6–20)
CALCIUM SERPL-MCNC: 9.1 MG/DL (ref 8.8–10)
CHLORIDE SERPL-SCNC: 103 MMOL/L (ref 98–107)
CHOLEST SERPL-MCNC: 182 MG/DL
CHOLEST/HDLC SERPL: 4.1 {RATIO}
CO2 SERPL-SCNC: 26 MMOL/L (ref 23–31)
CREAT SERPL-MCNC: 1.2 MG/DL (ref 0.72–1.25)
EGFR (NO RACE VARIABLE) (RUSH/TITUS): 60 ML/MIN/1.73M2
GLOBULIN SER-MCNC: 3.7 G/DL (ref 2–4)
GLUCOSE SERPL-MCNC: 87 MG/DL (ref 82–115)
HDLC SERPL-MCNC: 44 MG/DL (ref 35–60)
LDLC SERPL CALC-MCNC: 121 MG/DL
LDLC/HDLC SERPL: 2.8 {RATIO}
NONHDLC SERPL-MCNC: 138 MG/DL
POTASSIUM SERPL-SCNC: 3.9 MMOL/L (ref 3.5–5.1)
PROT SERPL-MCNC: 7.4 G/DL (ref 5.8–7.6)
SODIUM SERPL-SCNC: 137 MMOL/L (ref 136–145)
TRIGL SERPL-MCNC: 87 MG/DL (ref 34–140)
VLDLC SERPL-MCNC: 17 MG/DL

## 2025-06-19 PROCEDURE — 80053 COMPREHEN METABOLIC PANEL: CPT | Mod: ,,, | Performed by: CLINICAL MEDICAL LABORATORY

## 2025-06-19 PROCEDURE — 80061 LIPID PANEL: CPT | Mod: ,,, | Performed by: CLINICAL MEDICAL LABORATORY

## 2025-06-19 RX ORDER — PANTOPRAZOLE SODIUM 40 MG/1
40 TABLET, DELAYED RELEASE ORAL DAILY
Qty: 90 TABLET | Refills: 3 | Status: SHIPPED | OUTPATIENT
Start: 2025-06-19 | End: 2026-06-19

## 2025-06-19 NOTE — PATIENT INSTRUCTIONS
Patient Education     Preventing falls in adults   The Basics   Written by the doctors and editors at [facility]   Am I at risk of falling? --   Falls can happen to anyone, no matter how old they are. Several things can increase your risk of a fall, including:   Vision problems   Having certain chronic health conditions, being sick, having recently been in the hospital, or having had surgery   Changing the medicines you take   An unsafe or unfamiliar setting (for example, a room with rugs or furniture that might trip you, or an area you don't know well)  Your risk of falling increases as you grow older. That's because getting older can make it harder to walk steadily and keep your balance. Also, the effects of falls are more serious for older people.  Overall, 3 to 4 out of every 10 people over the age of 65 fall each year. If you have fallen before, you are at higher risk of falling again.  How can my doctor help me avoid falling? --   Your doctor can talk to you about the following things:   Past falls - Tell your doctor about any times that you fell or almost fell. They can then suggest ways to prevent another fall.   Your health conditions - Some health problems can put you at risk of falling. These include conditions that affect eyesight, hearing, muscle strength, or balance.   What to do if you are in the hospital - Falls can happen in the hospital because you are in an unfamiliar place. You might feel unsteady or drowsy from medicines or anesthesia. Or you might be attached to catheters or IV tubing that you could trip over. You are also likely to be weaker than usual.   Your medicines - Certain medicines can increase the risk of falling. These include some medicines for sleeping problems, anxiety, high blood pressure, or depression. Adding new medicines, or changing doses of some medicines, can also affect your risk of falling.  The more your doctor knows about your situation, the better they can help you. For  example, if you fell because you have a condition that causes pain, your doctor might suggest treatments to help with the pain. Or if any of your medicines are making you dizzy and more likely to fall, your doctor might switch you to a different medicine.  How can I help prevent falls? --   To help keep from falling, you can:   Make your home safer - Get rid of things that might make you trip or slip. This can include furniture, electrical cords, clutter, and loose rugs (figure 1). Keep your home well lit so you can easily see where you are going. Avoid storing things in high places so you don't have to reach or climb.   Wear non-slip socks or sturdy shoes that fit well - Wearing shoes with high heels or slippery soles, or shoes that are too loose, can lead to falls. Walking around in bare feet, or only socks, can also increase your risk of falling.   Take vitamin D pills - This might lower the risk of falls in older people who are at high risk or have low vitamin D levels. Your doctor can talk to you about whether you should take extra vitamin D, and how much.   Stay active - Moving your body regularly can help lower your risk of falling. It might also help prevent you from getting hurt if you do fall. It is best to do a few different activities that help with both strength and balance. There are many kinds of exercise that can be safe for older people. These include walking, swimming, and kyra chi (a Chinese martial art involving slow, gentle movements).   Use a cane, walker, or other safety device - If your doctor recommends that you use a cane or walker, make sure that it's the right size and you know how to use it. There are other devices that might help you avoid falling, too. These include grab bars or a sturdy seat for the shower, non-slip bath mats, and hand rails or treads for the stairs (to prevent slipping).   Take extra care if you had surgery or are in the hospital - Ask for help with getting out of  bed, getting up from a chair, or going to the bathroom. Your body needs time to heal, and it's normal to need help with these things while you recover. It can also help to keep your belongings within reach, and avoid walking around in the dark. If you need help, use the call button instead of trying to get up.  If you worry that you could fall, you can get an emergency alert system. This is usually an alarm button that lets you call for help if you fall and can't get up. If you live alone and do not have an emergency alert system, always carry a cell phone or portable phone with you when moving around the house.  How do I get up from a fall? --   Try not to be afraid. Stay calm, and take slow, deep breaths. If someone is near you, call for help right away. If you have an emergency alert system, use it.  Try to find out if you are hurt. If you are hurt badly, trying to get up can make things worse. If you do not think that you are hurt badly, come up with a plan to get up off of the floor.  Some tips to get up after a fall if you are alone:   Look around you for a piece of sturdy furniture such as a couch or chair. Try to move your body to the furniture. You might need to scoot, crawl, or roll to get close. Do these movements very slowly. If you feel any sharp pains, you might need to stop.   Once you are close to the furniture, roll onto your side. Pull your knees up toward your chest, and try to get on your hands and knees.   Put your hands on the seat of the couch or chair.   Bring 1 leg forward so the foot is flat on the floor. If you have a stronger leg, move this leg first.   Now, try to stand up. Once both feet are on the ground, slowly turn and lower yourself to sit down on the seat.  What should I do after a fall? --   See your doctor right away, even if you aren't hurt. Your doctor can try to figure out what caused the fall, and how likely you are to fall again. They will do an exam and talk to you about  your health problems, medicines, and activities. They can also check how well you walk, move, and balance. Then, they can suggest things you can do to lower your risk of falling again.  Many older people have a hard time recovering after a fall. Doing things to prevent falling can help you protect your health and independence.  All topics are updated as new evidence becomes available and our peer review process is complete.  This topic retrieved from [employer] on: Aug 15, 2024.  Topic 34698 Version 27.0  Release: 32.6.2 - C32.226  © 2024 UpToDate, Inc. and/or its affiliates. All rights reserved.  figure 1: How to avoid falling at home     This picture shows some of the things that can cause a fall in your home. Look around and remove any loose rugs, electrical cords, clutter, or furniture that could trip you.  Graphic 47682 Version 1.0  Consumer Information Use and Disclaimer   Disclaimer: This generalized information is a limited summary of diagnosis, treatment, and/or medication information. It is not meant to be comprehensive and should be used as a tool to help the user understand and/or assess potential diagnostic and treatment options. It does NOT include all information about conditions, treatments, medications, side effects, or risks that may apply to a specific patient. It is not intended to be medical advice or a substitute for the medical advice, diagnosis, or treatment of a health care provider based on the health care provider's examination and assessment of a patient's specific and unique circumstances. Patients must speak with a health care provider for complete information about their health, medical questions, and treatment options, including any risks or benefits regarding use of medications. This information does not endorse any treatments or medications as safe, effective, or approved for treating a specific patient. UpToDate, Inc. and its affiliates disclaim any warranty or liability relating to  this information or the use thereof.The use of this information is governed by the Terms of Use, available at https://www.wolterskluwer.com/en/know/clinical-effectiveness-terms. 2024© UpToDate, Inc. and its affiliates and/or licensors. All rights reserved.  Copyright   © 2024 [employer] and/or its affiliates. All rights reserved.

## 2025-06-19 NOTE — PROGRESS NOTES
[facility]  [address] [place] [address]  Phone: [phone]  Fax: [phone]       PATIENT NAME: Cj Thomas   : 1940    AGE: 84 y.o. DATE OF ENCOUNTER: 25    MRN: [id number]      PCP: Samantha Araujo FNP    Subjective:   CHANGE CHIEF COMPLAINT      :[id number]}274}  Chief Complaint   Patient presents with    Hypertension     Patient reports to the clinic today for 3 month follow up. He has had episodes of vomiting off and on since  with a burning pain in his chest.    Hyperlipidemia     Patient is fasting.    Health Maintenance     RSV Vaccine (Age 60+ and Pregnant patients)(1 - 1-dose 75+ series) would like to discuss     History of Present Illness    HPI:  Patient is an 84-year-old male with a history of hypertension, hyperlipidemia, GERD, and coronary artery disease, presenting for a routine three-month follow-up of suboptimally controlled hypertension. At his previous visit, his blood pressure had been running in the 150's and 170's. Today, his initial blood pressure check was 156/71, with a recheck of 138/68.    He reports episodes of vomiting with chest pain on and off since , approximately 11 days ago. On , he took a nap and then felt the need to vomit, doing so once that day. The following morning, he had the same symptoms and vomited again. These were the only two instances of vomiting.    A week ago, he had chest pain radiating to his arm. He did not seek medical attention for these symptoms, as he did not disclose them to his wife until after they had resolved.    He also reports a fall that occurred a few weeks ago. He tripped on a root near a pond and fell face-down. He considered resting but decided to get up instead.    He denies any current episodes of vomiting or chest pain.      ROS:  Constitutional: +falling  Cardiovascular: +chest pain  Gastrointestinal: +vomiting  Musculoskeletal: +limb pain         Allergies  "and Meds: 274}     Review of patient's allergies indicates:   Allergen Reactions    Niacin Swelling     "Not sure"    Demerol [meperidine] Other (See Comments)     "Forgets everything"    Iodine and iodide containing products     Statins-hmg-coa reductase inhibitors      Abnormal behavior. Body aches    Zetia [ezetimibe]     Iodinated contrast media Rash and Other (See Comments)     Passes out after    Iodine Nausea Only        Current Outpatient Medications   Medication Sig Dispense Refill    amLODIPine (NORVASC) 5 MG tablet Take 1 tablet (5 mg total) by mouth once daily. 90 tablet 3    clopidogreL (PLAVIX) 75 mg tablet Take 1 tablet (75 mg total) by mouth once daily. 90 tablet 3    fenofibrate 160 MG Tab Take 1 tablet (160 mg total) by mouth once daily. 90 tablet 3    losartan-hydrochlorothiazide 100-12.5 mg (HYZAAR) 100-12.5 mg Tab Take 1 tablet by mouth once daily. for HTN 90 tablet 3    metoprolol succinate (TOPROL-XL) 25 MG 24 hr tablet Take 1 tablet (25 mg total) by mouth once daily. 90 tablet 3    nitroGLYCERIN (NITROSTAT) 0.4 MG SL tablet Place 1 tablet (0.4 mg total) under the tongue every 5 (five) minutes as needed for Chest pain. 25 tablet 3    omega-3 fatty acids/fish oil (FISH OIL-OMEGA-3 FATTY ACIDS) 300-1,000 mg capsule Take 2 capsules by mouth 2 (two) times a day.       tamsulosin (FLOMAX) 0.4 mg Cap Take 1 capsule (0.4 mg total) by mouth once daily. 90 capsule 3     No current facility-administered medications for this visit.       Labs:274}   I have reviewed labs below:    Lab Results   Component Value Date    WBC 6.99 02/17/2025    RBC 5.06 02/17/2025    HGB 15.7 02/17/2025    HCT 47.6 02/17/2025     02/17/2025     02/17/2025    K 3.8 02/17/2025     02/17/2025    CALCIUM 9.2 02/17/2025    GLU 84 02/17/2025    BUN 15 02/17/2025    CREATININE 0.87 02/17/2025    EGFRNONAA 86 04/18/2022    ALT 12 02/17/2025    AST 17 02/17/2025    CHOL 235 (H) 02/17/2025    TRIG 192 (H) 02/17/2025 " "   HDL 39 02/17/2025    LDLCALC 158 02/17/2025    TSH 1.372 02/17/2025    HGBA1C 5.4 02/17/2025       Medical History: 274}     Past Medical History:   Diagnosis Date    Abnormal echocardiogram 11/10/2022    Anticoagulant long-term use     Benign prostate hyperplasia     Coronary artery disease involving native coronary artery of native heart with angina pectoris     COVID-19 02/10/2022    GERD (gastroesophageal reflux disease)     Hyperlipidemia     Hypertension     Osteoarthritis     Stroke       Tobacco Use History[1]   Past Surgical History:   Procedure Laterality Date    ARTHROSCOPY OF KNEE Left 2/21/2023    Procedure: ARTHROSCOPY, KNEE;  Surgeon: Garrett Shipley MD;  Location: [facility] OR;  Service: Orthopedics;  Laterality: Left;    CARTILAGE-SHAVING Left 2/21/2023    Procedure: CARTILAGE-SHAVING;  Surgeon: Garrett Shipley MD;  Location: [facility] OR;  Service: Orthopedics;  Laterality: Left;  shaving of medial femoral condyle    KNEE ARTHROSCOPY W/ MENISCECTOMY Left 2/21/2023    Procedure: ARTHROSCOPY, KNEE, WITH MENISCECTOMY;  Surgeon: Garrett Shipley MD;  Location: [facility] OR;  Service: Orthopedics;  Laterality: Left;  partial medial and lateral menisectomy      LEFT HEART CATHETERIZATION Right 11/15/2022    Procedure: Left heart cath;  Surgeon: Roby Zapata MD;  Location: [facility] CATH LAB;  Service: Cardiology;  Laterality: Right;  right wrist    TONSILLECTOMY      TOTAL KNEE ARTHROPLASTY Right         Health Maintenance:      Health Maintenance Topics with due status: Not Due       Topic Last Completion Date    TETANUS VACCINE 09/07/2017    Lipid Panel 02/17/2025    Aspirin/Antiplatelet Therapy 04/06/2025       Objective:  274}   Vital Signs  Temp: 97.7 °F (36.5 °C)  Temp Source: Oral  Pulse: (!) 56  Resp: 20  SpO2: 96 %  BP: 138/68  BP Location: Left arm  Patient Position: Sitting  Pain Score: 0-No pain  Height and Weight  Height: 5' 9" (175.3 cm)  Weight: 96.1 " kg (211 lb 12.8 oz)  BSA (Calculated - sq m): 2.16 sq meters  BMI (Calculated): 31.3  Weight in (lb) to have BMI = 25: 168.9    Over the last two weeks how often have you been bothered by little interest or pleasure in doing things: 0  Over the last two weeks how often have you been bothered by feeling down, depressed or hopeless: 0  PHQ-2 Total Score: 0    Wt Readings from Last 3 Encounters:   06/19/25 96.1 kg (211 lb 12.8 oz)   03/17/25 96.3 kg (212 lb 6.4 oz)   02/17/25 96.2 kg (212 lb)     Physical Exam  Vitals and nursing note reviewed.   Constitutional:       General: He is not in acute distress.     Appearance: Normal appearance. He is not ill-appearing.   HENT:      Head: Normocephalic.      Mouth/Throat:      Pharynx: Uvula midline. No uvula swelling.   Eyes:      Conjunctiva/sclera: Conjunctivae normal.      Pupils: Pupils are equal, round, and reactive to light.   Neck:      Thyroid: No thyromegaly.      Vascular: Normal carotid pulses. No carotid bruit.   Cardiovascular:      Rate and Rhythm: Normal rate and regular rhythm.      Pulses: Normal pulses.      Heart sounds: Normal heart sounds.   Pulmonary:      Effort: Pulmonary effort is normal. No respiratory distress.      Breath sounds: Normal breath sounds. No wheezing, rhonchi or rales.   Abdominal:      Palpations: Abdomen is soft.      Tenderness: There is no abdominal tenderness.   Musculoskeletal:      Cervical back: Neck supple.      Right lower leg: No edema.      Left lower leg: No edema.   Lymphadenopathy:      Cervical: No cervical adenopathy.   Skin:     General: Skin is warm and dry.      Coloration: Skin is not jaundiced or pale.   Neurological:      General: No focal deficit present.      Mental Status: He is alert and oriented to person, place, and time.      Gait: Gait normal.   Psychiatric:         Mood and Affect: Mood normal.         Behavior: Behavior normal.          Assessment and Plan: 274}       1. Essential  hypertension  Assessment & Plan:  Suboptimal control with /71 on 1st check and 138/68 on 2nd check    BP Readings from Last 3 Encounters:   06/19/25 138/68   03/17/25 122/78   02/17/25 (!) 172/77     Continue amlodipine 5 mg daily, losartan hydrochlorothiazide 100/12.5 mg daily, and metoprolol XL 25 mg daily.    Orders:  -     Comprehensive Metabolic Panel; Future; Expected date: 06/19/2025    2. Mixed hyperlipidemia  -     Comprehensive Metabolic Panel; Future; Expected date: 06/19/2025  -     Lipid Panel; Future; Expected date: 06/19/2025    3. Allergy to statin medication    4. Prostate cancer screening    5. Other long term (current) drug therapy  -     Comprehensive Metabolic Panel; Future; Expected date: 06/19/2025    6. Other chest pain  Assessment & Plan:  [facility] with  of the LAD/LCX and collaterals from the RCA. S/p LENA to the RCA 11/15/2022    Reports had left-sided chest pain with radiation to left arm one-week ago with no recurrence.  Prior to that had had a couple episodes of spontaneous vomiting.  States has been taking reflux med daily.    EKG without significant change.  Advised to update visit with Cardiology and go to ER if he has recurrence of symptoms.    Orders:  -     POCT EKG 12-LEAD (NOT FOR [facility] USE)    7. Coronary artery disease due to lipid rich plaque  -     POCT EKG 12-LEAD (NOT FOR [facility] USE)    8. Gastroesophageal reflux disease without esophagitis  -     pantoprazole (PROTONIX) 40 MG tablet; Take 1 tablet (40 mg total) by mouth once daily.  Dispense: 90 tablet; Refill: 3        Assessment & Plan    IMPRESSION:  - Assessed HTN control, noting initial BP of 156/71 and recheck of 138/68.  - Evaluated lipid management, considering intolerance to statins and use of Fenofibrate.  - Reviewed recent episodes of vomiting and chest pain, suspecting possible cardiac event.  - Ordered EKG to compare with previous results, noting no major changes but still concerning for potential  cardiac issue.  - Determined need for earlier cardiology follow-up due to recent symptoms and suboptimal lipid control.    CORONARY ARTERY DISEASE WITH UNSTABLE ANGINA:  - Monitored patient's history of coronary artery disease with stent to the RCA placed on 11/15/2022.  - Patient has experienced episodes of chest pain with radiation to the arm and vomiting since June 8th, suggestive of unstable angina.  - EKG shows no major change from previous but remains concerning for potential cardiac issue.  - Ordered repeat EKG for comparison.  - Follow-up with cardiologist Dr. Zapata sooner than 1 yr due Sept 2025.  - Instructed patient to go to the emergency room immediately if experiencing chest pain or similar symptoms in the future.    HYPERTENSION:  - Blood pressure was previously in the 150's to 170's.  - Today's initial reading was 156/71, with recheck at 138/68.  - Hypertension remains suboptimally controlled.    HYPERLIPIDEMIA:  - Patient has high cholesterol, elevated triglycerides, and low HDL.  - Patient has intolerance to statins due to rash, but reports adherence to Fenofibrate x3 months.  - Discussed connection between high cholesterol, plaque formation, and increased risk of cardiac events, emphasizing importance of lipid control due to known history of CAD.  - Prescribed Fenofibrate 160 mg daily.    GASTROESOPHAGEAL REFLUX DISEASE (GERD):  - Monitored episodes of vomiting and chest pain, possibly related to GERD.  - Emphasized importance of consistent acid reflux medication use to avoid confusion with cardiac symptoms.  - Protonix was no longer on his med list though he cited taking it every day.  I sent refills to ensure he has Rx to maintain compliance.    VOMITING:  - Episodes occurred on June 8th and 9th, with no further episodes reported.    MEDICATION ALLERGY:  - Patient has allergy to statins, which causes a rash.    CURRENT MEDICATIONS:  - Patient currently taking clopidogrel 75 mg  daily.    FOLLOW-UP:  - Scheduled follow up in 3 months for BP check and general evaluation.  - Patient advised to contact the office to schedule earlier appointment if experiencing any concerning symptoms before next scheduled visit.       Follow up in about 3 months (around 9/19/2025) for uncontrolled HTN, and follow-up as needed.    Signature:  AR Friedman-BC    Future Appointments   Date Time Provider Department Center   10/2/2025 11:40 AM Samantha Araujo FNP [facility]       This note was generated with the assistance of ambient listening technology. Verbal consent was obtained by the patient and accompanying visitor(s) for the recording of patient appointment to facilitate this note. I attest to having reviewed and edited the generated note for accuracy, though some syntax or spelling errors may persist. Please contact the author of this note for any clarification.           [1]   Social History  Tobacco Use   Smoking Status Never    Passive exposure: Never   Smokeless Tobacco Never

## 2025-06-19 NOTE — ASSESSMENT & PLAN NOTE
[facility] with  of the LAD/LCX and collaterals from the RCA. S/p LENA to the RCA 11/15/2022    Reports had left-sided chest pain with radiation to left arm one-week ago with no recurrence.  Prior to that had had a couple episodes of spontaneous vomiting.  States has been taking reflux med daily.    EKG without significant change.  Advised to update visit with Cardiology and go to ER if he has recurrence of symptoms.

## 2025-06-19 NOTE — ASSESSMENT & PLAN NOTE
Suboptimal control with /71 on 1st check and 138/68 on 2nd check    BP Readings from Last 3 Encounters:   06/19/25 138/68   03/17/25 122/78   02/17/25 (!) 172/77     Continue amlodipine 5 mg daily, losartan hydrochlorothiazide 100/12.5 mg daily, and metoprolol XL 25 mg daily.

## 2025-06-22 ENCOUNTER — RESULTS FOLLOW-UP (OUTPATIENT)
Dept: FAMILY MEDICINE | Facility: CLINIC | Age: 85
End: 2025-06-22

## 2025-07-14 ENCOUNTER — OFFICE VISIT (OUTPATIENT)
Dept: ORTHOPEDICS | Facility: CLINIC | Age: 85
End: 2025-07-14
Payer: MEDICARE

## 2025-07-14 ENCOUNTER — HOSPITAL ENCOUNTER (OUTPATIENT)
Dept: RADIOLOGY | Facility: HOSPITAL | Age: 85
Discharge: HOME | End: 2025-07-14
Attending: ORTHOPAEDIC SURGERY
Payer: MEDICARE

## 2025-07-14 VITALS
OXYGEN SATURATION: 98 % | BODY MASS INDEX: 31.7 KG/M2 | DIASTOLIC BLOOD PRESSURE: 65 MMHG | HEIGHT: 69 IN | SYSTOLIC BLOOD PRESSURE: 139 MMHG | WEIGHT: 214 LBS | HEART RATE: 60 BPM

## 2025-07-14 DIAGNOSIS — M17.10 ARTHRITIS OF KNEE: ICD-10-CM

## 2025-07-14 DIAGNOSIS — M25.561 CHRONIC PAIN OF BOTH KNEES: Primary | ICD-10-CM

## 2025-07-14 DIAGNOSIS — G89.29 CHRONIC PAIN OF BOTH KNEES: ICD-10-CM

## 2025-07-14 DIAGNOSIS — G89.29 CHRONIC PAIN OF BOTH KNEES: Primary | ICD-10-CM

## 2025-07-14 DIAGNOSIS — M25.561 CHRONIC PAIN OF BOTH KNEES: ICD-10-CM

## 2025-07-14 DIAGNOSIS — M25.562 CHRONIC PAIN OF BOTH KNEES: ICD-10-CM

## 2025-07-14 DIAGNOSIS — M25.562 CHRONIC PAIN OF BOTH KNEES: Primary | ICD-10-CM

## 2025-07-14 PROCEDURE — 99213 OFFICE O/P EST LOW 20 MIN: CPT | Mod: S$PBB,25,, | Performed by: ORTHOPAEDIC SURGERY

## 2025-07-14 PROCEDURE — 20610 DRAIN/INJ JOINT/BURSA W/O US: CPT | Mod: PBBFAC | Performed by: ORTHOPAEDIC SURGERY

## 2025-07-14 PROCEDURE — 73562 X-RAY EXAM OF KNEE 3: CPT | Mod: TC,50

## 2025-07-14 PROCEDURE — 99214 OFFICE O/P EST MOD 30 MIN: CPT | Mod: PBBFAC,25 | Performed by: ORTHOPAEDIC SURGERY

## 2025-07-14 PROCEDURE — 99999PBSHW PR PBB SHADOW TECHNICAL ONLY FILED TO HB: Mod: PBBFAC,,,

## 2025-07-14 PROCEDURE — 99999 PR PBB SHADOW E&M-EST. PATIENT-LVL IV: CPT | Mod: PBBFAC,,, | Performed by: ORTHOPAEDIC SURGERY

## 2025-07-14 PROCEDURE — 20610 DRAIN/INJ JOINT/BURSA W/O US: CPT | Mod: 50,PBBFAC | Performed by: ORTHOPAEDIC SURGERY

## 2025-07-14 RX ORDER — BUPIVACAINE HYDROCHLORIDE 2.5 MG/ML
2 INJECTION, SOLUTION EPIDURAL; INFILTRATION; INTRACAUDAL; PERINEURAL
Status: DISCONTINUED | OUTPATIENT
Start: 2025-07-14 | End: 2025-07-14 | Stop reason: HOSPADM

## 2025-07-14 RX ORDER — TRIAMCINOLONE ACETONIDE 40 MG/ML
40 INJECTION, SUSPENSION INTRA-ARTICULAR; INTRAMUSCULAR
Status: DISCONTINUED | OUTPATIENT
Start: 2025-07-14 | End: 2025-07-14 | Stop reason: HOSPADM

## 2025-07-14 RX ORDER — BUPIVACAINE HYDROCHLORIDE 2.5 MG/ML
1 INJECTION, SOLUTION EPIDURAL; INFILTRATION; INTRACAUDAL; PERINEURAL
Status: DISCONTINUED | OUTPATIENT
Start: 2025-07-14 | End: 2025-07-14 | Stop reason: HOSPADM

## 2025-07-14 RX ADMIN — TRIAMCINOLONE ACETONIDE 40 MG: 40 INJECTION, SUSPENSION INTRA-ARTICULAR; INTRAMUSCULAR at 02:07

## 2025-07-14 RX ADMIN — BUPIVACAINE HYDROCHLORIDE 2 ML: 2.5 INJECTION, SOLUTION EPIDURAL; INFILTRATION; INTRACAUDAL; PERINEURAL at 02:07

## 2025-07-14 RX ADMIN — BUPIVACAINE HYDROCHLORIDE 1 ML: 2.5 INJECTION, SOLUTION EPIDURAL; INFILTRATION; INTRACAUDAL; PERINEURAL at 02:07

## 2025-07-14 NOTE — PROGRESS NOTES
Radiology Interpretation        Patient Name: Cj Thomas  Date: 7/14/2025  YOB: 1940  MRN# [id number]        ORDERING DIAGNOSIS:    Encounter Diagnosis   Name Primary?    Chronic pain of both knees Yes                       Mp Orona MD                 Three views left knee skeletally mature individual there is some degenerative changes more involved medial compartment no fractures no subluxations impression degenerative changes more involved medial compartment left knee

## 2025-07-14 NOTE — PROGRESS NOTES
Radiology Interpretation        Patient Name: Cj Thomas  Date: 7/14/2025  YOB: 1940  MRN# [id number]        ORDERING DIAGNOSIS:    Encounter Diagnosis   Name Primary?    Chronic pain of both knees Yes      Three views right knee skeletally mature individual there is normal mineralization that has some degenerative changes more involved medial compartment small periarticular osteophyte no fractures impression degenerative changes right knee more involved medial compartment                 Mp Orona MD

## 2025-07-14 NOTE — PROGRESS NOTES
Patient is here for bilateral knee pain.  In his x-rays show he has some degenerative changes more involved medial compartment.  He has a little bit of crepitus on range motion.  Slight varus alignment.  No instability in the knees is noted he lacks few degrees of extension.  Slight crepitus on motion.  He has had previous viscosupplementation shots did not give him full relief of the symptoms.  At this time we discussed treatment options.  I injected each knee with 1 cc of Marcaine 1 cc Kenalog let him weightbear as tolerates.  I will follow him up in 3 months.  Did discuss total knee arthroplasty of the shots do not help.  Patient in his neurovascularly intact distally in bilateral lower extremities.

## 2025-07-14 NOTE — PROCEDURES
Large Joint Aspiration/Injection: bilateral knee    Date/Time: 7/14/2025 2:50 PM    Performed by: Mp Orona MD  Authorized by: Mp Orona MD    Consent Done?:  Yes (Verbal)  Indications:  Arthritis    Details:  Needle Size:  22 G  Ultrasonic Guidance for needle placement?: No    Approach:  Anterolateral  Location:  Knee  Laterality:  Bilateral  Site:  Bilateral knee  Medications (Right):  2 mL BUPivacaine (PF) 0.25% (2.5 mg/ml) 0.25 % (2.5 mg/mL); 40 mg triamcinolone acetonide 40 mg/mL  Medications (Left):  1 mL BUPivacaine (PF) 0.25% (2.5 mg/ml) 0.25 % (2.5 mg/mL); 40 mg triamcinolone acetonide 40 mg/mL  Patient tolerance:  Patient tolerated the procedure well with no immediate complications

## 2025-07-15 ENCOUNTER — OFFICE VISIT (OUTPATIENT)
Dept: FAMILY MEDICINE | Facility: CLINIC | Age: 85
End: 2025-07-15
Payer: MEDICARE

## 2025-07-15 VITALS
OXYGEN SATURATION: 98 % | HEIGHT: 69 IN | DIASTOLIC BLOOD PRESSURE: 68 MMHG | HEART RATE: 61 BPM | WEIGHT: 215.38 LBS | SYSTOLIC BLOOD PRESSURE: 115 MMHG | RESPIRATION RATE: 20 BRPM | BODY MASS INDEX: 31.9 KG/M2 | TEMPERATURE: 98 F

## 2025-07-15 DIAGNOSIS — R39.15 URINARY URGENCY: Primary | ICD-10-CM

## 2025-07-15 DIAGNOSIS — I10 ESSENTIAL HYPERTENSION: Chronic | ICD-10-CM

## 2025-07-15 DIAGNOSIS — N40.1 BENIGN PROSTATIC HYPERPLASIA WITH URINARY HESITANCY: Chronic | ICD-10-CM

## 2025-07-15 DIAGNOSIS — N41.9 PROSTATITIS, UNSPECIFIED PROSTATITIS TYPE: ICD-10-CM

## 2025-07-15 DIAGNOSIS — R39.11 BENIGN PROSTATIC HYPERPLASIA WITH URINARY HESITANCY: Chronic | ICD-10-CM

## 2025-07-15 LAB
BILIRUB SERPL-MCNC: NORMAL MG/DL
BILIRUB UR QL STRIP: NEGATIVE
BLOOD URINE, POC: NORMAL
CLARITY UR: CLEAR
CLARITY, UA: CLEAR
COLOR UR: NORMAL
COLOR, UA: NORMAL
GLUCOSE UR QL STRIP: NORMAL
GLUCOSE UR STRIP-MCNC: NORMAL MG/DL
KETONES UR QL STRIP: NORMAL
KETONES UR STRIP-SCNC: NEGATIVE MG/DL
LEUKOCYTE ESTERASE UR QL STRIP: NEGATIVE
LEUKOCYTE ESTERASE URINE, POC: NORMAL
NITRITE UR QL STRIP: NEGATIVE
NITRITE, POC UA: NORMAL
PH UR STRIP: 6.5 PH UNITS
PH, POC UA: 6.5
PROT UR QL STRIP: NEGATIVE
PROTEIN, POC: NORMAL
RBC # UR STRIP: NEGATIVE /UL
SP GR UR STRIP: 1.02
SPECIFIC GRAVITY, POC UA: 1.02
UROBILINOGEN UR STRIP-ACNC: NORMAL MG/DL
UROBILINOGEN, POC UA: 0.02

## 2025-07-15 PROCEDURE — 81003 URINALYSIS AUTO W/O SCOPE: CPT | Mod: QW,,, | Performed by: CLINICAL MEDICAL LABORATORY

## 2025-07-15 PROCEDURE — 99214 OFFICE O/P EST MOD 30 MIN: CPT | Mod: ,,, | Performed by: NURSE PRACTITIONER

## 2025-07-15 RX ORDER — CIPROFLOXACIN 500 MG/1
500 TABLET, FILM COATED ORAL EVERY 12 HOURS
Qty: 56 TABLET | Refills: 0 | Status: SHIPPED | OUTPATIENT
Start: 2025-07-15 | End: 2025-08-12

## 2025-07-15 NOTE — PROGRESS NOTES
" [facility]  [address] [place] [address]  Phone: [phone]  Fax: [phone]       PATIENT NAME: Cj Thomas   : 1940    AGE: 84 y.o. DATE OF ENCOUNTER: 7/15/25    MRN: [id number]      PCP: Samantha Araujo FNP    Subjective:   CHANGE CHIEF COMPLAINT      :[id number]}274}  Chief Complaint   Patient presents with    Urinary Tract Infection     Patient c/o difficulty and urgency with urination for the past few days.    Health Maintenance     RSV Vaccine (Age 60+ and Pregnant patients)(1 - 1-dose 75+ series) declined     History of Present Illness    HPI:  Patient reports urinary difficulties for 4-5 months, including trouble initiating urination, difficulty in urine flow, and straining to urinate with increased symptoms over the past few days. He describes his urine as yellowish. Patient also notes slowed bowel movements recently.    Patient has a history of prostate issues, evaluated by Dr. Marquez in [place] years ago. At that time, he had a cyst at the top of his kidney which led to a blockage and subsequent infection.    Patient previously urinated 3-4 times nightly when drinking tea regularly. Since switching to water, he now typically urinates once per night, sometimes not at all. He reports feeling better since making this change.    Patient denies pain in the kidney area and fever.      ROS:  Constitutional: -fevers  Gastrointestinal: -abdominal pain, +constipation  Genitourinary: +difficulty urinating, -nocturia, -flank pain, +abnormal urine appearance  Male Genitourinary: +hesitancy, +urine changes  Endocrine: +excessive sweating         Allergies and Meds: 274}     Review of patient's allergies indicates:   Allergen Reactions    Niacin Swelling     "Not sure"    Demerol [meperidine] Other (See Comments)     "Forgets everything"    Iodine and iodide containing products     Statins-hmg-coa reductase inhibitors      Abnormal behavior. Body aches    " Zetia [ezetimibe]     Iodinated contrast media Rash and Other (See Comments)     Passes out after    Iodine Nausea Only        Current Outpatient Medications   Medication Sig Dispense Refill    amLODIPine (NORVASC) 5 MG tablet Take 1 tablet (5 mg total) by mouth once daily. 90 tablet 3    clopidogreL (PLAVIX) 75 mg tablet Take 1 tablet (75 mg total) by mouth once daily. 90 tablet 3    fenofibrate 160 MG Tab Take 1 tablet (160 mg total) by mouth once daily. 90 tablet 3    losartan-hydrochlorothiazide 100-12.5 mg (HYZAAR) 100-12.5 mg Tab Take 1 tablet by mouth once daily. for HTN 90 tablet 3    metoprolol succinate (TOPROL-XL) 25 MG 24 hr tablet Take 1 tablet (25 mg total) by mouth once daily. 90 tablet 3    nitroGLYCERIN (NITROSTAT) 0.4 MG SL tablet Place 1 tablet (0.4 mg total) under the tongue every 5 (five) minutes as needed for Chest pain. 25 tablet 3    omega-3 fatty acids/fish oil (FISH OIL-OMEGA-3 FATTY ACIDS) 300-1,000 mg capsule Take 2 capsules by mouth 2 (two) times a day.       pantoprazole (PROTONIX) 40 MG tablet Take 1 tablet (40 mg total) by mouth once daily. 90 tablet 3    tamsulosin (FLOMAX) 0.4 mg Cap Take 1 capsule (0.4 mg total) by mouth once daily. 90 capsule 3    ciprofloxacin HCl (CIPRO) 500 MG tablet Take 1 tablet (500 mg total) by mouth every 12 (twelve) hours. 56 tablet 0     No current facility-administered medications for this visit.       Labs:274}   I have reviewed labs below:    Lab Results   Component Value Date    WBC 6.99 02/17/2025    RBC 5.06 02/17/2025    HGB 15.7 02/17/2025    HCT 47.6 02/17/2025     02/17/2025     06/19/2025    K 3.9 06/19/2025     06/19/2025    CALCIUM 9.1 06/19/2025    GLU 87 06/19/2025    BUN 18 06/19/2025    CREATININE 1.20 06/19/2025    EGFRNONAA 86 04/18/2022    ALT 23 06/19/2025    AST 27 06/19/2025    CHOL 182 06/19/2025    TRIG 87 06/19/2025    HDL 44 06/19/2025    LDLCALC 121 06/19/2025    TSH 1.372 02/17/2025    HGBA1C 5.4 02/17/2025  "      Medical History: 274}     Past Medical History:   Diagnosis Date    Abnormal echocardiogram 11/10/2022    Anticoagulant long-term use     Benign prostate hyperplasia     Coronary artery disease involving native coronary artery of native heart with angina pectoris     COVID-19 02/10/2022    GERD (gastroesophageal reflux disease)     Hyperlipidemia     Hypertension     Osteoarthritis     Stroke       Tobacco Use History[1]   Past Surgical History:   Procedure Laterality Date    ARTHROSCOPY OF KNEE Left 2/21/2023    Procedure: ARTHROSCOPY, KNEE;  Surgeon: Garrett Shipley MD;  Location: [facility] ORTHO OR;  Service: Orthopedics;  Laterality: Left;    CARTILAGE-SHAVING Left 2/21/2023    Procedure: CARTILAGE-SHAVING;  Surgeon: Garrett Shipley MD;  Location: [facility] OR;  Service: Orthopedics;  Laterality: Left;  shaving of medial femoral condyle    KNEE ARTHROSCOPY W/ MENISCECTOMY Left 2/21/2023    Procedure: ARTHROSCOPY, KNEE, WITH MENISCECTOMY;  Surgeon: Garrett Shipley MD;  Location: [facility] OR;  Service: Orthopedics;  Laterality: Left;  partial medial and lateral menisectomy      LEFT HEART CATHETERIZATION Right 11/15/2022    Procedure: Left heart cath;  Surgeon: Roby Zapata MD;  Location: [facility] CATH LAB;  Service: Cardiology;  Laterality: Right;  right wrist    TONSILLECTOMY      TOTAL KNEE ARTHROPLASTY Right         Health Maintenance:      Health Maintenance Topics with due status: Not Due       Topic Last Completion Date    TETANUS VACCINE 09/07/2017    Influenza Vaccine 01/15/2025    Lipid Panel 06/19/2025    Aspirin/Antiplatelet Therapy 07/15/2025       Objective:  274}   Vital Signs  Temp: 97.6 °F (36.4 °C)  Temp Source: Oral  Pulse: 61  Resp: 20  SpO2: 98 %  BP: 115/68  BP Location: Left arm  Patient Position: Sitting  Pain Score: 0-No pain  Height and Weight  Height: 5' 9" (175.3 cm)  Weight: 97.7 kg (215 lb 6.4 oz)  BSA (Calculated - sq m): 2.18 sq meters  BMI " (Calculated): 31.8  Weight in (lb) to have BMI = 25: 168.9    Over the last two weeks how often have you been bothered by little interest or pleasure in doing things: 0  Over the last two weeks how often have you been bothered by feeling down, depressed or hopeless: 0  PHQ-2 Total Score: 0    Wt Readings from Last 3 Encounters:   07/15/25 97.7 kg (215 lb 6.4 oz)   07/14/25 97.1 kg (214 lb)   06/19/25 96.1 kg (211 lb 12.8 oz)     Physical Exam  Vitals and nursing note reviewed.   Constitutional:       General: He is not in acute distress.     Appearance: Normal appearance. He is not ill-appearing.   HENT:      Head: Normocephalic.   Eyes:      Conjunctiva/sclera: Conjunctivae normal.   Neck:      Trachea: Trachea normal.   Cardiovascular:      Rate and Rhythm: Normal rate and regular rhythm.      Pulses: Normal pulses.      Heart sounds: Normal heart sounds.   Pulmonary:      Effort: Pulmonary effort is normal. No respiratory distress.      Breath sounds: Normal breath sounds. No wheezing, rhonchi or rales.   Abdominal:      General: Bowel sounds are normal.      Palpations: Abdomen is soft.      Tenderness: There is no right CVA tenderness, left CVA tenderness or guarding.   Musculoskeletal:      Cervical back: Neck supple.      Right lower leg: No edema.      Left lower leg: No edema.   Skin:     General: Skin is warm and dry.      Coloration: Skin is not jaundiced or pale.   Neurological:      Mental Status: He is alert and oriented to person, place, and time.      Gait: Gait normal.   Psychiatric:         Mood and Affect: Mood normal.         Behavior: Behavior normal.          Assessment and Plan: 274}       1. Urinary urgency  -     POCT URINALYSIS W/O SCOPE  -     Urinalysis, Reflex to Urine Culture; Future; Expected date: 07/15/2025    2. Prostatitis, unspecified prostatitis type  -     ciprofloxacin HCl (CIPRO) 500 MG tablet; Take 1 tablet (500 mg total) by mouth every 12 (twelve) hours.  Dispense: 56  tablet; Refill: 0    3. Benign prostatic hyperplasia with urinary hesitancy  Assessment & Plan:  Not controlled with tamsulosin 0.4 mg nightly.  Gradually increasing LUTS with current exacerbation, suspect prostatitis.  Start Cipro for prostatitis.  Consider increase of tamsulosin 0.4 mg 2 twice daily if symptoms do not improve with Cipro.    Orders:  -     Urinalysis, Reflex to Urine Culture; Future; Expected date: 07/15/2025    4. Essential hypertension  Assessment & Plan:  Well controlled    BP Readings from Last 3 Encounters:   07/15/25 115/68   07/14/25 139/65   06/19/25 138/68     Continue amlodipine 5 mg daily, losartan hydrochlorothiazide 100/12.5 mg daily, and metoprolol XL 25 mg daily.          Assessment & Plan    IMPRESSION:  - Suspect prostate infection due to enlarged prostate and urinary symptoms.    ENLARGED PROSTATE WITH LOWER URINARY TRACT SYMPTOMS:  - Continue Flomax (tamsulosin) at current dose.  - Patient reports difficulty urinating with yellowish urine for 4-5 months and straining due to enlarged prostate.  - If symptoms persist after completing antibiotic treatment, will consider increasing Flomax dosage to twice daily.  - Patient instructed to contact office within a week if not experiencing symptom relief from antibiotics, or if urinary symptoms persist after completing the full antibiotic course.    - Prescribed ciprofloxacin 500 mg twice daily for 28 days for presumed prostate infection.  - Explained that prostate infections typically require longer antibiotic courses and can be difficult to treat.  - Ordered urinalysis to check for urinary tract infection.    GENERAL HEALTH RECOMMENDATIONS:  - Discussed that urine should be clear to pale yellow when adequately hydrated.  - Patient advised to increase water intake and reduce tea consumption to improve hydration status and urine color.    FOLLOW-UP:  - Keep scheduled follow-up visit on October 2nd.  - Earlier follow-up may be needed based  on symptom progression.     Signature:  AR Friedman-BC    Future Appointments   Date Time Provider Department Center   10/2/2025 11:40 AM Samantha Araujo FNP [facility]     This note was generated with the assistance of ambient listening technology. Verbal consent was obtained by the patient and accompanying visitor(s) for the recording of patient appointment to facilitate this note. I attest to having reviewed and edited the generated note for accuracy, though some syntax or spelling errors may persist. Please contact the author of this note for any clarification.           [1]   Social History  Tobacco Use   Smoking Status Never    Passive exposure: Never   Smokeless Tobacco Never

## 2025-07-15 NOTE — ASSESSMENT & PLAN NOTE
Well controlled    BP Readings from Last 3 Encounters:   07/15/25 115/68   07/14/25 139/65   06/19/25 138/68     Continue amlodipine 5 mg daily, losartan hydrochlorothiazide 100/12.5 mg daily, and metoprolol XL 25 mg daily.

## 2025-07-15 NOTE — ASSESSMENT & PLAN NOTE
Not controlled with tamsulosin 0.4 mg nightly.  Gradually increasing LUTS with current exacerbation, suspect prostatitis.  Start Cipro for prostatitis.  Consider increase of tamsulosin 0.4 mg 2 twice daily if symptoms do not improve with Cipro.

## (undated) DEVICE — PAD ABDOMINAL 8X7.5 STERILE

## (undated) DEVICE — BANDAGE PRCAR COMPR 11YDX6IN

## (undated) DEVICE — ELECTRODE REM POLYHESIVE II

## (undated) DEVICE — SOL IRR SOD CHL .9% POUR

## (undated) DEVICE — CHLORAPREP 10.5 ML APPLICATOR

## (undated) DEVICE — INTRODUCER IDEAL SHTH 6F 11CM

## (undated) DEVICE — GLOVE BIOGEL SKINSENSE PI 7.5

## (undated) DEVICE — PACK KNEE ARTHROSCOPY  RUSH

## (undated) DEVICE — GLOVE 6.5 PROTEXIS PI BLUE

## (undated) DEVICE — TOURNIQUET SB QC SP 34X4IN

## (undated) DEVICE — CANISTER SUCTION MEDI-VAC 12L

## (undated) DEVICE — DRAPE ANGIO BRACH 38X44IN

## (undated) DEVICE — GUIDEWIRE J .035X260CM

## (undated) DEVICE — SET IV PRIMARY

## (undated) DEVICE — NDL PERCUTANEOUS ENTRYBSDN 18

## (undated) DEVICE — CLIPPER BLADE MOD 4406 (CAREF)

## (undated) DEVICE — OXISENSOR ADULT DIGIT N/S

## (undated) DEVICE — CATH RADIAL TIG 6FR 4.0 110CM

## (undated) DEVICE — KIT HEART ANGIO MFLD LEFT RUSH

## (undated) DEVICE — GUIDE VISTA 6FR AL 1

## (undated) DEVICE — SET EXTENSION CLEARLINK 2INJ

## (undated) DEVICE — TUBING CROSSFLOW INFLOW CASS

## (undated) DEVICE — LINE MCRSTRM NONINTUB ST ETC02

## (undated) DEVICE — INTRODUCER CATH 6F 11CM

## (undated) DEVICE — SYR MED RAD 150ML

## (undated) DEVICE — DECANTER FLUID TRNSF WHITE 9IN

## (undated) DEVICE — GUIDEWIRE CHOICE FLOP J

## (undated) DEVICE — SOL NACL IRR 3000ML

## (undated) DEVICE — GLOVE SURG BIOGEL LATEX SZ 7.5

## (undated) DEVICE — DEVICE SUCTION H20 BROOM 12FT

## (undated) DEVICE — OXISENSOR NEONATAL/ADULT N/S

## (undated) DEVICE — PROBE AARDVARK SUC 0.5X135MM

## (undated) DEVICE — PROTECTOR ULNAR NERVE FOAM

## (undated) DEVICE — WIRE X-SUP CHOICE PT .014X182

## (undated) DEVICE — CONTRAST ISOVUE 370 100ML

## (undated) DEVICE — GLOVE BIOGEL SKINSENSE PI 6.0

## (undated) DEVICE — CATH FL 3.5 5FR

## (undated) DEVICE — CATH DIAG JACKY 3.5 6FRX110CM

## (undated) DEVICE — GOWN POLY REINF BRTH SLV XL

## (undated) DEVICE — GLOVE PROTEXIS PI CRM 5.5

## (undated) DEVICE — DEVICE BLUE DIAMOND INFL 20ML

## (undated) DEVICE — SHAVER TOMCAT 4.0

## (undated) DEVICE — APPLICATOR CHLORAPREP ORN 26ML

## (undated) DEVICE — BAND TR COMP DEVICE REG 24CM

## (undated) DEVICE — GLOVE BIOGEL SKINSENSE PI 6.5

## (undated) DEVICE — CATH IMPULSE D6F PIG 5PK

## (undated) DEVICE — GLOVE BIOGEL SKINSENSE PI 8.0

## (undated) DEVICE — DRESSING TRANS 4X4 TEGADERM

## (undated) DEVICE — Device